# Patient Record
Sex: MALE | Race: WHITE | Employment: OTHER | URBAN - NONMETROPOLITAN AREA
[De-identification: names, ages, dates, MRNs, and addresses within clinical notes are randomized per-mention and may not be internally consistent; named-entity substitution may affect disease eponyms.]

---

## 2018-09-20 ENCOUNTER — HOSPITAL ENCOUNTER (OUTPATIENT)
Dept: SPEECH THERAPY | Age: 73
Setting detail: THERAPIES SERIES
Discharge: HOME | End: 2018-09-20
Payer: MEDICARE

## 2018-09-20 PROCEDURE — G9169 MEMORY GOAL STATUS: HCPCS

## 2018-09-20 PROCEDURE — G9168 MEMORY CURRENT STATUS: HCPCS

## 2018-09-20 PROCEDURE — 92523 SPEECH SOUND LANG COMPREHEN: CPT

## 2018-09-24 ENCOUNTER — HOSPITAL ENCOUNTER (OUTPATIENT)
Dept: OCCUPATIONAL THERAPY | Age: 73
Setting detail: THERAPIES SERIES
Discharge: HOME | End: 2018-09-24
Payer: MEDICARE

## 2018-09-24 ENCOUNTER — HOSPITAL ENCOUNTER (OUTPATIENT)
Dept: PHYSICAL THERAPY | Age: 73
Setting detail: THERAPIES SERIES
Discharge: HOME | End: 2018-09-24
Payer: MEDICARE

## 2018-09-24 PROCEDURE — 97161 PT EVAL LOW COMPLEX 20 MIN: CPT

## 2018-09-24 PROCEDURE — G8978 MOBILITY CURRENT STATUS: HCPCS

## 2018-09-24 PROCEDURE — G8979 MOBILITY GOAL STATUS: HCPCS

## 2018-09-24 PROCEDURE — G8987 SELF CARE CURRENT STATUS: HCPCS

## 2018-09-24 PROCEDURE — 97166 OT EVAL MOD COMPLEX 45 MIN: CPT

## 2018-09-24 PROCEDURE — G8988 SELF CARE GOAL STATUS: HCPCS

## 2018-09-24 NOTE — FLOWSHEET NOTE
** PLEASE SIGN, DATE AND TIME CERTIFICATION BELOW AND RETURN TO [facility] OUTPATIENT REHABILITATION (FAX #: [phone]). ATTEST/CO-SIGN IF ACCESSING VIA [employer]. THANK YOU.**    I certify that I have examined the patient below and determined that Physical Medicine and Rehabilitation service is necessary and that I approve the established plan of care for up to 90 days or as specifically noted. Attestation, signature or co-signature of physician indicates approval of certification requirements.    ________________________ ____________ __________  Physician Signature   Date   Time    [address]    Time In: 4047  Time Out: 2832  Minutes: 46  Timed Code Treatment Minutes: 0 Minutes     Date: 2018  Patient Name: Laurent Patel        CSN: [id number]     : 1945  (72 y.o.)  Gender: male   Referring Practitioner: Marcus Myers MD  Diagnosis: Lewy body demential with behavioral disturbance (G31.83,F02.81)  Treatment Diagnosis: Lewy body dementia with behavior disturbance  Additional Pertinent Hx: Patient's wife reports patient was diagnosed with Lewy body dementia in 2016. Patient's wife reports she has had to help him more this past year with ADLs. Patient wife reports on 18, patient fell and sustained fracture to left 4th digit. Wife reports splint cumbersome so they took it off. Laurent Patel  has no past medical history on file. Laurent Patel  has no past surgical history on file. See Medical History Questionnaire for information related to allergies and medications. General:  OT Visit Information  Onset Date: 18  OT Insurance Information: Medicare, secondary [employer], unlimited visists based on medical necessity, modalities covered, iontophoresis and hot/cold packs are not coverd.     Total # of Visits to Date: 1  Certification Period Expiration Date: 18  Progress Note Counter: 1/10 for PN  Comments: Script dated 9/12/18 for OT  Chart Reviewed: Yes  Patient assessed for rehabilitation services?: Yes    Restrictions/Precautions:  Restrictions/Precautions: General Precautions    Position Activity Restriction  Other position/activity restrictions: fall risk         Subjective:  Subjective: Patient reports his endurance is low. Pain:  Pain Assessment  Patient Currently in Pain: No    Social/Functional History:    Lives With: Spouse  Type of Home: House  Home Layout: Two level, Bed/Bath upstairs (bed and bath on first level for him)  Home Access: Stairs to enter with rails  Entrance Stairs - Number of Steps: 5  Entrance Stairs - Rails: Both  Home Equipment: Rolling walker (uses walker in morning when he is very stiff)     Bathroom Shower/Tub: Walk-in shower  Bathroom Toilet: Standard  Bathroom Equipment: Toilet raiser, Grab bars around toilet  IADL Comments: brushes teeth independently, wife asisit with shower and dressing,      Receives Help From: Family             Active : No  Occupation: Retired  Type of occupation: truck   retired. Leisure & Hobbies: Had [employer]  Additional Comments:  left handed, tremors greater in the left. Objective             Cognition Comment: Repeated cues for following directions. Able to state his birthday. Voice is low and soft. Questionable historian. Sensation  Overall Sensation Status: WFL (will monitor further)    Observation: leans to the left in sitting, forward head, left digits 4 and 5 with contusions, several scabs noted on bilateral arms - patient's wife reports these are from falls at home. Hand Dominance: Left            LUE AROM (degrees)  LUE General AROM: left UE AROM shoulder flexion = 82 degrees, active scaption ~ 80 degrees. Difficulty assessing ROM due to decreased following directions.  left active elbow extension limited          RUE AROM (degrees)  RUE General AROM: right UE AROM shoulder flexion = 80 degrees, active scaption ~ 80 degrees. Difficulty assessing ROM due to decreased following directions. right elbow extension actively limited. LUE Strength  LUE Strength Comment: General decreased strength and endurance. Not formally tested due to difficulty following directions. Left Hand Strength -  (lbs)  Handle Setting 2: 7              RUE Strength  RUE Strength Comment: General decreased strength and endurance. Not formally tested due to difficulty following directions. Right Hand Strength -  (lbs)  Handle Setting 2: 30                        Coordination and Movement description: Tremors (left UE tremors greater than right )                Left 9-Hole Peg Test:  (62 seconds)   Right 9 Hole Peg Test Time (secs):  (53 seconds)        Fine Motor Comment: slow with digit tip opposition              ADL  Feeding: Modified independent   Grooming: Moderate assistance (wife shaves patient, he brushes his teeth.  )  UE Bathing: Maximum assistance  LE Bathing: Maximum assistance  UE Dressing: Maximum assistance  LE Dressing: Maximum assistance (Hard to reach feet due to stiffness per patient wife report)  Toileting: Maximum assistance  Additional Comments: Assist with shaving, showering, dressing. Patient makes his oatmeal in the morning and brushes his teeth. Eats by himself with occasional assistance reequested. Transfers  Transfer Comments: Sit to stand with supervision. Occasional increased time due to decreased motor planning. Activity Tolerance: Additional Comments: Patient tolerated session well.       Assessment:  Performance deficits / Impairments: Decreased functional mobility , Decreased ADL status, Decreased strength, Decreased fine motor control, Decreased high-level IADLs, Decreased endurance, Decreased ROM, Decreased cognition, Decreased coordination  Prognosis: Fair  Clinical Decision Making: dressing tasks. OT G-codes  Functional Assessment Tool Used: Upper Extremity Functional Scale  Score: 11  Functional Limitation: Self care  Self Care Current Status (): At least 80 percent but less than 100 percent impaired, limited or restricted  Self Care Goal Status (): At least 60 percent but less than 80 percent impaired, limited or restricted       Evaluation Complexity: Based on the findings of patient history, examination, clinical presentation, and decision making during this evaluation, this patient is of medium complexity.     Lux Minor, OTR/L #[id number]

## 2018-09-24 NOTE — FLOWSHEET NOTE
Training    History: Personal factors or comorbidities that impact plan of care - Low Complexity: no personal factors or comorbidities    Examination: Body structures and functions, activity limitations, participation restrictions; using standardized tests and measures - Moderate Complexity: 3 or morebody structures and functional, activity limitations and/or participation restrictions. See restrictions and objective section above for details. Clinical Presentation: Moderate - Evolving with Changing Characteristics: moderate joint stiffness, and gait deficits presnet    Decision Making: Low Complexity due to expect good outcome with POC. Decision making was based on patient assessment and decision making process in determining plan of care and establishing reasonable expectations for measurable functional outcomes. Evaluation Complexity: Based on the findings of patient history, examination, clinical presentation, and decision making during this evaluation, the evaluation of Carolina Tomas  is of low complexity. Goals:  Patient goals : less falls, move better    Short term goals  Time Frame for Short term goals: 5 weeks   Short term goal 1: improve addison balcne score form a 20 to 26   Short term goal 2: increase strength B  LE by 1/2 muscle grade   Short term goal 3: increase ROm hip flex to 120, hip abd to 30 to assist with dressing   Short term goal 4: pat will perform sit to stand for 3 trial with contact guard assist for safetly adn vc     Long term goals  Time Frame for Long term goals : 10 weeks  Long term goal 1: inprove addison scale to 36 to lessen risk of falls  Long term goal 2: increase B LE strength to 4+ to allow for sit to stand with supervison  Long term goal 3: pat will walk with increased step length and minimal hesitation with turning corners.    Long term goal 4: I HEP     PT G-Codes  Functional Assessment Tool Used: Addison   Score: 20/56  Functional Limitation: Mobility: Walking and moving around  Mobility: Walking and Moving Around Current Status (): At least 60 percent but less than 80 percent impaired, limited or restricted  Mobility: Walking and Moving Around Goal Status ():  At least 20 percent but less than 40 percent impaired, limited or restricted    Deny Shaw, PT

## 2018-10-10 ENCOUNTER — HOSPITAL ENCOUNTER (OUTPATIENT)
Dept: PHYSICAL THERAPY | Age: 73
Setting detail: THERAPIES SERIES
Discharge: HOME | End: 2018-10-10
Payer: MEDICARE

## 2018-10-10 ENCOUNTER — HOSPITAL ENCOUNTER (OUTPATIENT)
Dept: OCCUPATIONAL THERAPY | Age: 73
Setting detail: THERAPIES SERIES
Discharge: HOME | End: 2018-10-10
Payer: MEDICARE

## 2018-10-10 PROCEDURE — 97110 THERAPEUTIC EXERCISES: CPT

## 2018-10-10 NOTE — PROGRESS NOTES
[facility]  OUTPATIENT OCCUPATIONAL THERAPY  Daily Note  Jesús Lebron    Time In: 2891  Time Out: 1328  Minutes: 50  Timed Code Treatment Minutes: 50 Minutes     Date: 10/10/2018  Patient Name: Ambrose Street        CSN: [id number]   : 1945  (72 y.o.)  Gender: male   Referring Practitioner: lAexandr Jones MD  Diagnosis: Lewy body demential with behavioral disturbance (G31.83,F02.81)          General:  OT Visit Information  Total # of Visits to Date: 2  Progress Note Counter: 2/10 for PN       Restrictions/Precautions:  Restrictions/Precautions: General Precautions    Position Activity Restriction  Other position/activity restrictions: fall risk         Subjective:  Subjective: Patient reports some difficulty with depth perception. Pain:  Patient Currently in Pain: No       Objective:     Upper Extremity Function  UE AROM: AROM Bilateral horizontal abduction/adduction x 10 reps, abduction/adduction x 10 reps with cues for Big motion, elbow flexion/extension x 10 reps, punches forward with reciprocal motion for bilateral UEs  UE AAROM: AAROM with dowel aaron for elbow flexion/extension, shoulder flexion/extension x 10 reps each, chest press with dowel aaron x 5 reps  UE Strengthing: gripping blue theraball with forward punches x 10 reps bilateral hiands, UBE at 120 2 minutes backward and 1 minute forward with minimal cues for hand placement. Coordination  Fine Motor: Pinching pennies out of yellow putty for fine motor coordination task                             Additional Activities Comment  Additional Activities: Left  = 18#, right  = 37#, Patient used toilet with wife checking to assist with fasteners as needed. Patient stood to wash hands with SBA. Moderate cues for motor planning to sit and turn and vise versa to get on and off UBE. Activity Tolerance: Additional Comments: Patient tolerated session well.      Assessment:  Assessment: Patient is

## 2018-10-11 ENCOUNTER — HOSPITAL ENCOUNTER (OUTPATIENT)
Dept: SPEECH THERAPY | Age: 73
Setting detail: THERAPIES SERIES
Discharge: HOME | End: 2018-10-11
Payer: MEDICARE

## 2018-10-11 PROCEDURE — 97127 HC SP THER IVNTJ W/FOCUS COG FUNCJ: CPT

## 2018-10-15 ENCOUNTER — HOSPITAL ENCOUNTER (OUTPATIENT)
Dept: OCCUPATIONAL THERAPY | Age: 73
Setting detail: THERAPIES SERIES
Discharge: HOME | End: 2018-10-15
Payer: MEDICARE

## 2018-10-15 ENCOUNTER — HOSPITAL ENCOUNTER (OUTPATIENT)
Dept: SPEECH THERAPY | Age: 73
Setting detail: THERAPIES SERIES
Discharge: HOME | End: 2018-10-15
Payer: MEDICARE

## 2018-10-15 ENCOUNTER — HOSPITAL ENCOUNTER (OUTPATIENT)
Dept: PHYSICAL THERAPY | Age: 73
Setting detail: THERAPIES SERIES
Discharge: HOME | End: 2018-10-15
Payer: MEDICARE

## 2018-10-15 PROCEDURE — 97530 THERAPEUTIC ACTIVITIES: CPT

## 2018-10-15 PROCEDURE — 97127 HC SP THER IVNTJ W/FOCUS COG FUNCJ: CPT

## 2018-10-15 PROCEDURE — 97110 THERAPEUTIC EXERCISES: CPT

## 2018-10-15 PROCEDURE — 97116 GAIT TRAINING THERAPY: CPT

## 2018-10-15 NOTE — PROGRESS NOTES
care.    Certification required: [] Yes - See Physician Certification below.       [x] No       Time in: 1100  Time out: 1130  Untimed treatment: 0  Timed treatment: 30  Total time: [address]

## 2018-10-18 ENCOUNTER — HOSPITAL ENCOUNTER (OUTPATIENT)
Dept: PHYSICAL THERAPY | Age: 73
Setting detail: THERAPIES SERIES
Discharge: HOME | End: 2018-10-18
Payer: MEDICARE

## 2018-10-18 ENCOUNTER — HOSPITAL ENCOUNTER (OUTPATIENT)
Dept: OCCUPATIONAL THERAPY | Age: 73
Setting detail: THERAPIES SERIES
Discharge: HOME | End: 2018-10-18
Payer: MEDICARE

## 2018-10-18 ENCOUNTER — HOSPITAL ENCOUNTER (OUTPATIENT)
Dept: SPEECH THERAPY | Age: 73
Setting detail: THERAPIES SERIES
Discharge: HOME | End: 2018-10-18
Payer: MEDICARE

## 2018-10-18 PROCEDURE — 97530 THERAPEUTIC ACTIVITIES: CPT

## 2018-10-18 PROCEDURE — 97110 THERAPEUTIC EXERCISES: CPT

## 2018-10-18 PROCEDURE — 97127 HC SP THER IVNTJ W/FOCUS COG FUNCJ: CPT

## 2018-10-22 ENCOUNTER — HOSPITAL ENCOUNTER (OUTPATIENT)
Dept: PHYSICAL THERAPY | Age: 73
Setting detail: THERAPIES SERIES
Discharge: HOME | End: 2018-10-22
Payer: MEDICARE

## 2018-10-22 ENCOUNTER — HOSPITAL ENCOUNTER (OUTPATIENT)
Dept: OCCUPATIONAL THERAPY | Age: 73
Setting detail: THERAPIES SERIES
Discharge: HOME | End: 2018-10-22
Payer: MEDICARE

## 2018-10-22 ENCOUNTER — HOSPITAL ENCOUNTER (OUTPATIENT)
Dept: SPEECH THERAPY | Age: 73
Setting detail: THERAPIES SERIES
Discharge: HOME | End: 2018-10-22
Payer: MEDICARE

## 2018-10-22 PROCEDURE — 97110 THERAPEUTIC EXERCISES: CPT

## 2018-10-22 PROCEDURE — 97530 THERAPEUTIC ACTIVITIES: CPT

## 2018-10-22 PROCEDURE — 97127 HC SP THER IVNTJ W/FOCUS COG FUNCJ: CPT

## 2018-10-22 PROCEDURE — 97116 GAIT TRAINING THERAPY: CPT

## 2018-10-25 ENCOUNTER — HOSPITAL ENCOUNTER (OUTPATIENT)
Dept: SPEECH THERAPY | Age: 73
Setting detail: THERAPIES SERIES
Discharge: HOME | End: 2018-10-25
Payer: MEDICARE

## 2018-10-25 ENCOUNTER — HOSPITAL ENCOUNTER (OUTPATIENT)
Dept: OCCUPATIONAL THERAPY | Age: 73
Setting detail: THERAPIES SERIES
Discharge: HOME | End: 2018-10-25
Payer: MEDICARE

## 2018-10-25 ENCOUNTER — HOSPITAL ENCOUNTER (OUTPATIENT)
Dept: PHYSICAL THERAPY | Age: 73
Setting detail: THERAPIES SERIES
Discharge: HOME | End: 2018-10-25
Payer: MEDICARE

## 2018-10-25 PROCEDURE — 97110 THERAPEUTIC EXERCISES: CPT

## 2018-10-25 PROCEDURE — 97530 THERAPEUTIC ACTIVITIES: CPT

## 2018-10-25 PROCEDURE — 97127 HC SP THER IVNTJ W/FOCUS COG FUNCJ: CPT

## 2018-10-25 NOTE — PROGRESS NOTES
exercise, gait, - BIG exercise program, large step size, standing balance   Current Treatment Recommendations: Strengthening, Balance Training, ROM, Functional Mobility Training, ADL/Self-care Training, Positioning, Modalities, Home Exercise Program, Safety Education & Training, Neuromuscular Re-education, Gait Training  Plan Comment: cont with POC    Goals:  Patient goals : less falls, move better    Short term goals  Time Frame for Short term goals: 5 weeks   Short term goal 1: improve addison balcne score form a 20 to 26   Short term goal 2: increase strength B  LE by 1/2 muscle grade   Short term goal 3: increase ROm hip flex to 120, hip abd to 30 to assist with dressing   Short term goal 4: pat will perform sit to stand for 3 trial with contact guard assist for safetly adn vc     Long term goals  Time Frame for Long term goals : 10 weeks  Long term goal 1: inprove addison scale to 36 to lessen risk of falls  Long term goal 2: increase B LE strength to 4+ to allow for sit to stand with supervison  Long term goal 3: pat will walk with increased step length and minimal hesitation with turning corners.    Long term goal 4: I HEP          Talia Ye  [id number]

## 2018-10-29 ENCOUNTER — HOSPITAL ENCOUNTER (OUTPATIENT)
Dept: OCCUPATIONAL THERAPY | Age: 73
Setting detail: THERAPIES SERIES
Discharge: HOME | End: 2018-10-29
Payer: MEDICARE

## 2018-10-29 ENCOUNTER — HOSPITAL ENCOUNTER (OUTPATIENT)
Dept: PHYSICAL THERAPY | Age: 73
Setting detail: THERAPIES SERIES
Discharge: HOME | End: 2018-10-29
Payer: MEDICARE

## 2018-10-29 ENCOUNTER — HOSPITAL ENCOUNTER (OUTPATIENT)
Dept: SPEECH THERAPY | Age: 73
Setting detail: THERAPIES SERIES
Discharge: HOME | End: 2018-10-29
Payer: MEDICARE

## 2018-10-29 PROCEDURE — 97110 THERAPEUTIC EXERCISES: CPT

## 2018-10-29 PROCEDURE — 97530 THERAPEUTIC ACTIVITIES: CPT

## 2018-10-29 PROCEDURE — 97127 HC SP THER IVNTJ W/FOCUS COG FUNCJ: CPT

## 2018-10-29 NOTE — PROGRESS NOTES
session well. Assessment:  Assessment: Patient is progressing towards his goals. Patient Education:  Patient Education: Verbal cues for tasks/activities.             Plan:  Plan Comment: Continue with current plan of care                      Babita Floyd OTR/L #[id number]

## 2018-10-31 ENCOUNTER — HOSPITAL ENCOUNTER (OUTPATIENT)
Dept: OCCUPATIONAL THERAPY | Age: 73
Setting detail: THERAPIES SERIES
Discharge: HOME | End: 2018-10-31
Payer: MEDICARE

## 2018-10-31 PROCEDURE — 97110 THERAPEUTIC EXERCISES: CPT

## 2018-11-01 ENCOUNTER — HOSPITAL ENCOUNTER (OUTPATIENT)
Dept: SPEECH THERAPY | Age: 73
Setting detail: THERAPIES SERIES
Discharge: HOME | End: 2018-11-01
Payer: MEDICARE

## 2018-11-01 ENCOUNTER — APPOINTMENT (OUTPATIENT)
Dept: OCCUPATIONAL THERAPY | Age: 73
End: 2018-11-01
Payer: MEDICARE

## 2018-11-01 ENCOUNTER — APPOINTMENT (OUTPATIENT)
Dept: PHYSICAL THERAPY | Age: 73
End: 2018-11-01
Payer: MEDICARE

## 2018-11-01 PROCEDURE — 97127 HC SP THER IVNTJ W/FOCUS COG FUNCJ: CPT

## 2018-11-01 PROCEDURE — G9168 MEMORY CURRENT STATUS: HCPCS

## 2018-11-01 PROCEDURE — G9169 MEMORY GOAL STATUS: HCPCS

## 2018-11-01 NOTE — FLOWSHEET NOTE
NEW GOAL: Patient will complete orientation, biographical information, and functional carryover tasks with 50% accuracy when provided min cues for use of external aids/compensatory strategies to promote improved awareness for participation in home and community activities. INTERVENTIONS:     Month: independent with self-referral to calendar   Date: mod cues with use of calendar required   Day of the week: mod cues required   Year: Independent with referral to calendar     Navigating through memory book for recall of functional information:  Recall of 2 things he did yesterday: required mod cues to refer to memory binder to recall 2/2 events     Prospective recall:  Pt independently recalled that he has an appointment in [facility] this afternoon. Pt completed short term memory task characterized by recall of 3 item grocery list when provided mod cues to utilize self-repetition. Pt completed with the following results:  Immediate recall: 2/3 items recalled independently; required max cues to recall 1/3 items  Repetition: 2/3 items recalled independently; required max cues to recall 1/3 items   4 minute time delay:  2/3 items recalled independently; required mod cues to recall 1/3 items    Goal 2: Patient will complete basic safety awareness, problem solving, and sequencing tasks with 50% accuracy given mod cues to improve independence for ADL completion. GOAL MET. NEW GOAL: Patient will complete basic safety awareness, problem solving, and sequencing tasks with 70% accuracy when provided min cues to improve independence for ADL completion. INTERVENTIONS:  Did not address due to focus on other goals.    PREVIOUS SESSION: Answering basic questions about a calendar:  3/7 independent; 3/7 with mod cues; 1/7 with max cues   Matching holidays with their corresponding months:  4/10 independent; 4/10 with min cues; 2/10 with mod cues     Goal 3: Patient will complete expressive (speech naming) and receptive impaired attention skills that affects his topic maintenance and ability to execute therapy tasks without distraction. Impaired working memory affects his ability to finish stories that he initiates, with patient often saying 'I forget what I was going to say.'  However, patient can be easily redirected. The patient has demonstrated overall improved conversational intelligibility to ~90% when provided min cues to maintain increased vocal intensity but demonstrates overt difficulty with executing 2 step verbal commands that contain temporal prepositions (ie 'before' and 'after').   Recommend ongoing speech therapy services for short duration, 2x/week for 2 weeks, to continue addressing carryover use of external memory aids, ability to sequence basic ADL tasks, and speech compensatory strategies to maximize success in home setting    Plan for Next Session: monitor carryover use of orientation aids; navigating memory aid to find functional information; speech intelligibility strategies in structured speech tasks; selective attention task; complex yes/no questions    Patient Tolerance of Treatment:  [x]Tolerated well []Tolerated fair []Required rest breaks  []Fatigued    Education:  [x]Education was provided []Education not provided due to:  Learner:  [x]Patient [x]Significant Other: after the session  []Other  Education provided regarding:  [x]Goals and POC []Diet and swallowing precautions    []Home Exercise Program:   [x]Progress and/or discharge information  Method of Education: [x]Discussion [x]Demonstration []Handout []Other:  Evaluation of Education:  [x]Verbalized understanding:  []Demonstrates without assistance  [x]Demonstrates with assistance: patient  [x]Needs further instruction    []No evidence of learning  []No family present      Plan: []Continue with current plan of care []Modify current plan of care   [x]Progress note - frequency and duration: 2x/week for 2 weeks    []Discharge notes - reason for

## 2018-11-02 ENCOUNTER — APPOINTMENT (OUTPATIENT)
Dept: PHYSICAL THERAPY | Age: 73
End: 2018-11-02
Payer: MEDICARE

## 2018-11-05 ENCOUNTER — HOSPITAL ENCOUNTER (OUTPATIENT)
Dept: PHYSICAL THERAPY | Age: 73
Setting detail: THERAPIES SERIES
Discharge: HOME | End: 2018-11-05
Payer: MEDICARE

## 2018-11-05 ENCOUNTER — HOSPITAL ENCOUNTER (OUTPATIENT)
Dept: OCCUPATIONAL THERAPY | Age: 73
Setting detail: THERAPIES SERIES
Discharge: HOME | End: 2018-11-05
Payer: MEDICARE

## 2018-11-05 ENCOUNTER — HOSPITAL ENCOUNTER (OUTPATIENT)
Dept: SPEECH THERAPY | Age: 73
Setting detail: THERAPIES SERIES
Discharge: HOME | End: 2018-11-05
Payer: MEDICARE

## 2018-11-05 PROCEDURE — 97127 HC SP THER IVNTJ W/FOCUS COG FUNCJ: CPT

## 2018-11-05 PROCEDURE — 97110 THERAPEUTIC EXERCISES: CPT

## 2018-11-05 PROCEDURE — 97530 THERAPEUTIC ACTIVITIES: CPT

## 2018-11-05 PROCEDURE — 97116 GAIT TRAINING THERAPY: CPT

## 2018-11-05 NOTE — PROGRESS NOTES
Reviewed exercise techniques            Plan:  Plan Comment: Continue with current plan of care.                        Dustin Eng, OTR/L #[id number]

## 2018-11-05 NOTE — PROGRESS NOTES
impairment level or higher to promote improved awareness and participation in home and community settings for optimal level of functionality. ONGOING  Goal 2: Patient will engage in 3 minute conversational sample with 100% intelligibility when provided no more than min cues to promote improved ability to express thoughts without difficulty. ONGOING      Assessment: [x]Progressing towards goals []Not Progressing towards goals    Plan for Next Session: monitor carryover use of orientation aids; navigating memory aid to find functional information; complex yes/no questions; basic safety problem solving     Patient Tolerance of Treatment:  [x]Tolerated well []Tolerated fair []Required rest breaks  []Fatigued    Education:  [x]Education was provided []Education not provided due to:  Learner:  [x]Patient [x]Significant Other: after the session  []Other  Education provided regarding:  [x]Goals and POC []Diet and swallowing precautions    [x]Home Exercise Program: selective attention visual scanning activities   []Progress and/or discharge information  Method of Education: [x]Discussion [x]Demonstration [x]Handout []Other:  Evaluation of Education:  [x]Verbalized understanding:  []Demonstrates without assistance  [x]Demonstrates with assistance: patient  [x]Needs further instruction    []No evidence of learning  []No family present      Plan: [x]Continue with current plan of care []Modify current plan of care   []Progress note - frequency and duration: 2x/week for 2 weeks    []Discharge notes - reason for discharge:     [x]Patient continues to require treatment by a licensed therapist to address functional deficits as outlined in the established plan of care. Certification required: [] Yes - See Physician Certification above.       [x] No       Time in: 1100  Time out: 1130  Untimed treatment: 0  Timed treatment: 30  Total time: [address] [facility] [address]

## 2018-11-08 ENCOUNTER — HOSPITAL ENCOUNTER (OUTPATIENT)
Dept: SPEECH THERAPY | Age: 73
Setting detail: THERAPIES SERIES
Discharge: HOME | End: 2018-11-08
Payer: MEDICARE

## 2018-11-08 ENCOUNTER — HOSPITAL ENCOUNTER (OUTPATIENT)
Dept: PHYSICAL THERAPY | Age: 73
Setting detail: THERAPIES SERIES
Discharge: HOME | End: 2018-11-08
Payer: MEDICARE

## 2018-11-08 ENCOUNTER — HOSPITAL ENCOUNTER (OUTPATIENT)
Dept: OCCUPATIONAL THERAPY | Age: 73
Setting detail: THERAPIES SERIES
Discharge: HOME | End: 2018-11-08
Payer: MEDICARE

## 2018-11-08 PROCEDURE — G8979 MOBILITY GOAL STATUS: HCPCS

## 2018-11-08 PROCEDURE — G8978 MOBILITY CURRENT STATUS: HCPCS

## 2018-11-08 PROCEDURE — G8987 SELF CARE CURRENT STATUS: HCPCS

## 2018-11-08 PROCEDURE — 97530 THERAPEUTIC ACTIVITIES: CPT

## 2018-11-08 PROCEDURE — G8988 SELF CARE GOAL STATUS: HCPCS

## 2018-11-08 PROCEDURE — 97116 GAIT TRAINING THERAPY: CPT

## 2018-11-08 PROCEDURE — 97127 HC SP THER IVNTJ W/FOCUS COG FUNCJ: CPT

## 2018-11-08 NOTE — PROGRESS NOTES
[address] THERAPY    [x] DAILY NOTE [] PROGRESS NOTE [] DISCHARGE NOTE    []Outpatient [address]   [][place] [address]   [x][facility]      Date: 2018  Patient Name: Min Heller      CSN: [id number]   : 1945  (72 y.o.)  Gender: male   Referring Physician:  Virginie Bustillos MD  Diagnosis: Lewy body dementia with behavioral disturbance  Secondary Diagnosis:  Cognitive deficits, expressive and receptive language deficits  Insurance/Certification Information: Medicare   Visit number / total approved visits: 10 - unlimited visits based on medical necessity   Visit count since last progress note: 2   Certification Date:   Last scheduled appointment: 11/15/18   Date of Last MBS:  N/A  Diet:  Regular diet with thin liquids    Pain: 0/10     Subjective: Pt pleasant and cooperative. Feedback provided to wife following session. SHORT TERM GOALS:  Short-term Goals  Timeframe for Short-term Goals: 3 weeks  Goal 1: Patient will complete orientation, biographical information, and functional carryover tasks with 50% accuracy when provided min cues for use of external aids/compensatory strategies to promote improved awareness for participation in home and community activities.    INTERVENTIONS:     Month: independent, no referral to calendar required   Date: min cues to find date on calendar   Day of the week: independent, no referral to calendar required     Year: Independent, no referral to calendar required   Upcoming holiday: min cues required     Navigating through memory book for recall of functional information:  Something he did previous date:  Spontaneously recalled x1 detail - max assist required to navigate through memory book to find 'daily schedule'   Recall of x2 doctors: x1 recalled spontaneously; x1 recalled independently with referral to memory book   Recall of x1 nationwide news event:

## 2018-11-08 NOTE — PROGRESS NOTES
actitities , but showing great improvment in strength and balance,   he will benfifit form continue skilled PT for progression of  therpauetic activities and gait   Prognosis: Good       Patient Education:  Patient Education: balance, exericise                      Plan:  Times per week: 2  Plan weeks: 5  Specific instructions for Next Treatment: exercise, gait, - BIG exercise program, large step size, standing balance     Goals:  Patient goals : less falls, move better    Short term goals  Time Frame for Short term goals: 5 weeks   Short term goal 1: improve aguilera balcne score from a 20 to 26   MET now 43,  see long term goals  Short term goal 2: increase strength B  LE by 1/2 muscle grade   met now LE strength 4/5  new goal  LE strength 4+   Short term goal 3: increase ROm hip flex to 120, hip abd to 30 to assist with dressing   not addressed today   Short term goal 4: pat will perform sit to stand for 3 trial with contact guard assist for safely with minimal vc -- paritally met  2 reps ,then fast decent  contineu goal .     Long term goals  Time Frame for Long term goals : 10 weeks  Long term goal 1: inprove aguilera scale to 36 to lessen risk of falls  MET  New goal-- AGUILERA balance scale 43 on 2 consective trials to be consistient with gait and lower risk of falls. Long term goal 2: increase B LE strength to 4+ to allow for sit to stand with supervison- npot met  contienu 5 more weeks. Long term goal 3: pat will walk with increased step length and minimal hesitation with turning corners. Long term goal 4: I HEP --ongoing,     PT G-Codes  Functional Assessment Tool Used: aguilera  Score: 43/56  Functional Limitation: Mobility: Walking and moving around  Mobility: Walking and Moving Around Current Status (): At least 20 percent but less than 40 percent impaired, limited or restricted  Mobility: Walking and Moving Around Goal Status ():  At least 20 percent but less than 40 percent impaired, limited or

## 2018-11-12 ENCOUNTER — HOSPITAL ENCOUNTER (OUTPATIENT)
Dept: SPEECH THERAPY | Age: 73
Setting detail: THERAPIES SERIES
Discharge: HOME | End: 2018-11-12
Payer: MEDICARE

## 2018-11-12 ENCOUNTER — HOSPITAL ENCOUNTER (OUTPATIENT)
Dept: OCCUPATIONAL THERAPY | Age: 73
Setting detail: THERAPIES SERIES
Discharge: HOME | End: 2018-11-12
Payer: MEDICARE

## 2018-11-12 PROCEDURE — 97110 THERAPEUTIC EXERCISES: CPT

## 2018-11-12 PROCEDURE — 97127 HC SP THER IVNTJ W/FOCUS COG FUNCJ: CPT

## 2018-11-15 ENCOUNTER — HOSPITAL ENCOUNTER (OUTPATIENT)
Dept: SPEECH THERAPY | Age: 73
Setting detail: THERAPIES SERIES
Discharge: HOME | End: 2018-11-15
Payer: MEDICARE

## 2018-11-15 ENCOUNTER — HOSPITAL ENCOUNTER (OUTPATIENT)
Dept: OCCUPATIONAL THERAPY | Age: 73
Setting detail: THERAPIES SERIES
Discharge: HOME | End: 2018-11-15
Payer: MEDICARE

## 2018-11-15 ENCOUNTER — HOSPITAL ENCOUNTER (OUTPATIENT)
Dept: PHYSICAL THERAPY | Age: 73
Setting detail: THERAPIES SERIES
Discharge: HOME | End: 2018-11-15
Payer: MEDICARE

## 2018-11-15 PROCEDURE — 97127 HC SP THER IVNTJ W/FOCUS COG FUNCJ: CPT

## 2018-11-15 PROCEDURE — G9169 MEMORY GOAL STATUS: HCPCS

## 2018-11-15 PROCEDURE — G9168 MEMORY CURRENT STATUS: HCPCS

## 2018-11-15 PROCEDURE — 97110 THERAPEUTIC EXERCISES: CPT

## 2018-11-15 PROCEDURE — 97530 THERAPEUTIC ACTIVITIES: CPT

## 2018-11-15 NOTE — FLOWSHEET NOTE
Next Session: monitor carryover use of orientation aids; navigating memory aid to find functional information; 2 step verbal commands; basic problem solving     Patient Tolerance of Treatment:  [x]Tolerated well []Tolerated fair []Required rest breaks  []Fatigued    Education:  [x]Education was provided []Education not provided due to:  Learner:  [x]Patient [x]Significant Other: before and after the session  []Other  Education provided regarding:  [x]Goals and POC []Diet and swallowing precautions    [x]Home Exercise Program: 3 step written directions; functional recall task  [x]Progress and/or discharge information  Method of Education: [x]Discussion [x]Demonstration [x]Handout []Other:  Evaluation of Education:  [x]Verbalized understanding:  []Demonstrates without assistance  [x]Demonstrates with assistance: patient  [x]Needs further instruction    []No evidence of learning  []No family present      Plan: []Continue with current plan of care []Modify current plan of care   [x]Progress note - frequency and duration: 1x/week for 4 weeks    []Discharge notes - reason for discharge:     [x]Patient continues to require treatment by a licensed therapist to address functional deficits as outlined in the established plan of care. Certification required: [x] Yes - See Physician Certification above. [] No       Time in: 1235  Time out: 1300  Untimed treatment: 0  Timed treatment: 25  Total time: 25    SLP G-Codes  Functional Assessment Tool Used: Functional Communication Measures   Score: 3  Functional Limitations: Memory  Memory Current Status (): At least 60 percent but less than 80 percent impaired, limited or restricted  Memory Goal Status ():  At least 40 percent but less than 60 percent impaired, limited or restricted        Lizzie Benavidez [address], [address]

## 2018-11-20 ENCOUNTER — HOSPITAL ENCOUNTER (OUTPATIENT)
Dept: OCCUPATIONAL THERAPY | Age: 73
Setting detail: THERAPIES SERIES
Discharge: HOME | End: 2018-11-20
Payer: MEDICARE

## 2018-11-20 PROCEDURE — 97110 THERAPEUTIC EXERCISES: CPT

## 2018-11-20 ASSESSMENT — PAIN DESCRIPTION - LOCATION: LOCATION: ABDOMEN

## 2018-11-20 ASSESSMENT — PAIN SCALES - GENERAL: PAINLEVEL_OUTOF10: 1

## 2018-11-20 ASSESSMENT — PAIN DESCRIPTION - ORIENTATION: ORIENTATION: RIGHT;LOWER

## 2018-11-20 NOTE — PROGRESS NOTES
[facility]  OUTPATIENT OCCUPATIONAL THERAPY  Daily Note  True Bejarano    Time In: 1103  Time Out: 1600  Minutes: 30  Timed Code Treatment Minutes: 30 Minutes     Date: 2018  Patient Name: Tonny Lennox        CSN: [id number]   : 1945  (72 y.o.)  Gender: male   Referring Practitioner: Howard Garcia MD  Diagnosis: Lewy body demential with behavioral disturbance (G31.83,F02.81)          General:  OT Visit Information  Onset Date: 18  OT Insurance Information: Medicare, secondary Hum[employer], unlimited visists based on medical necessity, modalities covered, iontophoresis and hot/cold packs are not coverd. Total # of Visits to Date: 15  Certification Period Expiration Date: 18  Progress Note Counter: 3/10 for PN  Comments: Script dated 18 for OT       Restrictions/Precautions:  Restrictions/Precautions: General Precautions    Position Activity Restriction  Other position/activity restrictions: fall risk         Subjective:  Subjective: Patient relates he has pain in his lower right abdomin for 2-3 days now, did take some pepto bismol which seemed to help. Pain:  Patient Currently in Pain: Yes  Pain Assessment: 0-10  Pain Level: 1  Pain Location: Abdomen  Pain Orientation: Right, Lower       Objective:     Upper Extremity Function  UE Strengthing: Seated yellow theraband bilateral horizontal abduction/adduction x10 reps, rows with therapist holding 1 end of band x10 reps.  speed x1 minute forward x1 minute backward. Additional Activities Comment  Additional Activities: Standing at counter top to complete activity with graded clothespins using bilateral UE's to place onto vertical aaron, significant cues needed at start of task to place onto aaron as patient tends to pinch and move up one horizontal aaron. Several reminders needed and increased time.  Then had patient remove clothespins and place like colors together on horizontal

## 2018-11-21 ENCOUNTER — APPOINTMENT (OUTPATIENT)
Dept: PHYSICAL THERAPY | Age: 73
End: 2018-11-21
Payer: MEDICARE

## 2018-11-27 ENCOUNTER — HOSPITAL ENCOUNTER (OUTPATIENT)
Dept: PHYSICAL THERAPY | Age: 73
Setting detail: THERAPIES SERIES
Discharge: HOME | End: 2018-11-27
Payer: MEDICARE

## 2018-11-27 PROCEDURE — 97110 THERAPEUTIC EXERCISES: CPT

## 2018-11-29 ENCOUNTER — HOSPITAL ENCOUNTER (OUTPATIENT)
Dept: PHYSICAL THERAPY | Age: 73
Setting detail: THERAPIES SERIES
Discharge: HOME | End: 2018-11-29
Payer: MEDICARE

## 2018-11-29 ENCOUNTER — HOSPITAL ENCOUNTER (OUTPATIENT)
Dept: OCCUPATIONAL THERAPY | Age: 73
Setting detail: THERAPIES SERIES
Discharge: HOME | End: 2018-11-29
Payer: MEDICARE

## 2018-11-29 ENCOUNTER — HOSPITAL ENCOUNTER (OUTPATIENT)
Dept: SPEECH THERAPY | Age: 73
Setting detail: THERAPIES SERIES
Discharge: HOME | End: 2018-11-29
Payer: MEDICARE

## 2018-11-29 PROCEDURE — 97127 HC SP THER IVNTJ W/FOCUS COG FUNCJ: CPT

## 2018-11-29 PROCEDURE — 97110 THERAPEUTIC EXERCISES: CPT

## 2018-11-29 NOTE — PROGRESS NOTES
[x]Education was provided []Education not provided due to:  Learner:  [x]Patient []Significant Other: before and after the session  []Other  Education provided regarding:  [x]Goals and POC []Diet and swallowing precautions    [x]Home Exercise Program: time management problems   []Progress and/or discharge information  Method of Education: [x]Discussion [x]Demonstration [x]Handout []Other:  Evaluation of Education:  [x]Verbalized understanding:  []Demonstrates without assistance  [x]Demonstrates with assistance: patient  [x]Needs further instruction    []No evidence of learning  [x]No family present      Plan: [x]Continue with current plan of care []Modify current plan of care   []Progress note - frequency and duration: 1x/week for 4 weeks    []Discharge notes - reason for discharge:     [x]Patient continues to require treatment by a licensed therapist to address functional deficits as outlined in the established plan of care. Certification required: [] Yes - See Physician Certification above.       [x] No       Time in: 1133  Time out: 1200  Untimed treatment: 0  Timed treatment: 27  Total time: Logan Benavidez [address], [address]

## 2018-12-03 ENCOUNTER — HOSPITAL ENCOUNTER (OUTPATIENT)
Dept: OCCUPATIONAL THERAPY | Age: 73
Setting detail: THERAPIES SERIES
Discharge: HOME | End: 2018-12-03
Payer: MEDICARE

## 2018-12-03 ENCOUNTER — HOSPITAL ENCOUNTER (OUTPATIENT)
Dept: PHYSICAL THERAPY | Age: 73
Setting detail: THERAPIES SERIES
Discharge: HOME | End: 2018-12-03
Payer: MEDICARE

## 2018-12-03 PROCEDURE — 97110 THERAPEUTIC EXERCISES: CPT

## 2018-12-03 NOTE — PROGRESS NOTES
[facility]  OUTPATIENT OCCUPATIONAL THERAPY  Daily Note  Jany Lou    Time In: 1430  Time Out: 1500  Minutes: 30  Timed Code Treatment Minutes: 30 Minutes     Date: 12/3/2018  Patient Name: Trice Chu        CSN: [id number]   : 1945  (72 y.o.)  Gender: male   Referring Practitioner: Valarie Mayo MD  Diagnosis: Lewy body demential with behavioral disturbance (G31.83,F02.81)          General:  OT Visit Information  Total # of Visits to Date: 15  Progress Note Counter: 5/10 for PN       Restrictions/Precautions:  Restrictions/Precautions: General Precautions    Position Activity Restriction  Other position/activity restrictions: fall risk         Subjective:  Subjective: Patient is pleasant and cooperative, nothing new to report today. Pain:  Patient Currently in Pain: No       Objective:     Upper Extremity Function  UE PROM: Supine PROM to bilateral shoulders all motions to tolerance -  some tightness noted in all planes. UE AAROM: Pulleys for shoulder flexion and scaption, standing wall slides for flexion bilateral UEs x 10 reps each, supine AAROM with dowel aaron for shoulder flexion, horizontal abduction/adduction and chest press x 10 reps each  UE Stretching: Supine over pressure from therapist for pec stretchin 10 second hold x 5 reps. UE Strengthing: Red theraputty for bilateral , taffy pulls and pinches                                                Activity Tolerance: Additional Comments: Patient tolerated treatment well. Assessment:  Assessment: Patient is progressing towards his goals. Patient Education:  Patient Education: No changes to HEP. Plan:  Plan Comment: Continue with current plan of care.                        Geo Herring OTR/L #[id number]

## 2018-12-06 ENCOUNTER — HOSPITAL ENCOUNTER (OUTPATIENT)
Dept: PHYSICAL THERAPY | Age: 73
Setting detail: THERAPIES SERIES
Discharge: HOME | End: 2018-12-06
Payer: MEDICARE

## 2018-12-06 ENCOUNTER — HOSPITAL ENCOUNTER (OUTPATIENT)
Dept: SPEECH THERAPY | Age: 73
Setting detail: THERAPIES SERIES
Discharge: HOME | End: 2018-12-06
Payer: MEDICARE

## 2018-12-06 ENCOUNTER — HOSPITAL ENCOUNTER (OUTPATIENT)
Dept: OCCUPATIONAL THERAPY | Age: 73
Setting detail: THERAPIES SERIES
Discharge: HOME | End: 2018-12-06
Payer: MEDICARE

## 2018-12-06 PROCEDURE — 97127 HC SP THER IVNTJ W/FOCUS COG FUNCJ: CPT

## 2018-12-06 PROCEDURE — 97110 THERAPEUTIC EXERCISES: CPT

## 2018-12-06 NOTE — PROGRESS NOTES
[address] THERAPY      [x] DAILY NOTE [] PROGRESS NOTE [] DISCHARGE NOTE    []Outpatient [address]   [][place] [facility]   [x][facility]      Date: 2018  Patient Name: Jean Lewis      CSN: [id number]   : 1945  (72 y.o.)  Gender: male   Referring Physician:  Amber Quiñones MD  Diagnosis: Lewy body dementia with behavioral disturbance  Secondary Diagnosis:  Cognitive deficits, expressive and receptive language deficits  Insurance/Certification Information: Medicare   Visit number / total approved visits: 15 - unlimited visits based on medical necessity   Visit count since last progress note: 2   Certification Date: 56/10/17  Last scheduled appointment: 18  Date of Last MBS:  N/A   Diet:  Regular diet with thin liquids    Pain: 0/10     Subjective: Pt cooperative and attentive, verbalized some frustration with time related cognitive tasks. Feedback provided to wife following session. SHORT TERM GOALS:  Short-term Goals  Timeframe for Short-term Goals: 4 weeks  Goal 1: Patient will complete orientation, biographical and emergency information, and functional carryover tasks with 80% accuracy at a Mod I level with independent use of external aids and compensatory strategies to promote improved awareness for participation in home and community activities. INTERVENTIONS:     Month: independent   Date: independent with self-referral to calendar     Day of the week: independent    Year: Independent   Upcoming holiday: independent     Navigating through memory book for recall of functional information:  Finding medication list:  Mod I     Goal 2: Patient will complete basic safety awareness, problem solving, and sequencing tasks with 70% accuracy when provided min cues to improve independence for ADL completion.     INTERVENTIONS: Putting stimulus times into an analog clock:   with mod cues - patient

## 2018-12-10 ENCOUNTER — HOSPITAL ENCOUNTER (OUTPATIENT)
Dept: PHYSICAL THERAPY | Age: 73
Setting detail: THERAPIES SERIES
Discharge: HOME | End: 2018-12-10
Payer: MEDICARE

## 2018-12-10 ENCOUNTER — HOSPITAL ENCOUNTER (OUTPATIENT)
Dept: OCCUPATIONAL THERAPY | Age: 73
Setting detail: THERAPIES SERIES
Discharge: HOME | End: 2018-12-10
Payer: MEDICARE

## 2018-12-10 PROCEDURE — 97110 THERAPEUTIC EXERCISES: CPT

## 2018-12-13 ENCOUNTER — HOSPITAL ENCOUNTER (OUTPATIENT)
Dept: OCCUPATIONAL THERAPY | Age: 73
Setting detail: THERAPIES SERIES
Discharge: HOME | End: 2018-12-13
Payer: MEDICARE

## 2018-12-13 ENCOUNTER — HOSPITAL ENCOUNTER (OUTPATIENT)
Dept: PHYSICAL THERAPY | Age: 73
Setting detail: THERAPIES SERIES
Discharge: HOME | End: 2018-12-13
Payer: MEDICARE

## 2018-12-13 ENCOUNTER — HOSPITAL ENCOUNTER (OUTPATIENT)
Dept: SPEECH THERAPY | Age: 73
Setting detail: THERAPIES SERIES
Discharge: HOME | End: 2018-12-13
Payer: MEDICARE

## 2018-12-13 PROCEDURE — 97110 THERAPEUTIC EXERCISES: CPT

## 2018-12-13 PROCEDURE — G9170 MEMORY D/C STATUS: HCPCS

## 2018-12-13 PROCEDURE — 97127 HC SP THER IVNTJ W/FOCUS COG FUNCJ: CPT

## 2018-12-13 PROCEDURE — G9169 MEMORY GOAL STATUS: HCPCS

## 2018-12-13 NOTE — PROGRESS NOTES
high-level IADLs  Prognosis: Good  Discharge Recommendations: Continue to assess pending progress    Patient Education:  Patient Education: ther ex                       Plan:  Times per week: 2  Plan weeks: 5  Specific instructions for Next Treatment: exercise, gait, - BIG exercise program, large step size, standing balance   Current Treatment Recommendations: Strengthening, Balance Training, ROM, Functional Mobility Training, ADL/Self-care Training, Positioning, Modalities, Home Exercise Program, Safety Education & Training, Neuromuscular Re-education, Gait Training  Plan Comment: cont with POC    Goals:  Patient goals : less falls, move better    Short term goals  Time Frame for Short term goals: 5 weeks   Short term goal 1: improve aguilera balcne score from a 20 to 26   MET now 43,  see long term goals  Short term goal 2: increase strength B  LE by 1/2 muscle grade   met now LE strength 4/5  new goal  LE strength 4+   Short term goal 3: increase ROm hip flex to 120, hip abd to 30 to assist with dressing   not addressed today   Short term goal 4: pat will perform sit to stand for 3 trial with contact guard assist for safely with minimal vc -- paritally met  2 reps ,then fast decent  contineu goal .     Long term goals  Time Frame for Long term goals : 10 weeks  Long term goal 1: inprove aguilera scale to 36 to lessen risk of falls  MET  New goal-- AGUILERA balance scale 43 on 2 consective trials to be consistient with gait and lower risk of falls. Long term goal 2: increase B LE strength to 4+ to allow for sit to stand with supervison- npot met  contienu 5 more weeks. Long term goal 3: pat will walk with increased step length and minimal hesitation with turning corners.    Long term goal 4: I HEP --ongoing,          Khloe Lewis  [id number]

## 2018-12-13 NOTE — PROGRESS NOTES
[facility]  OUTPATIENT OCCUPATIONAL THERAPY  Daily Note  Brendon Hernandez    Time In: 1300  Time Out: 1330  Minutes: 30  Timed Code Treatment Minutes: 30 Minutes     Date: 2018  Patient Name: Lulú Agustin        CSN: [id number]   : 1945  (72 y.o.)  Gender: male   Referring Practitioner: Alberto Larsen MD  Diagnosis: Lewy body demential with behavioral disturbance (G31.83,F02.81)          General:  OT Visit Information  Total # of Visits to Date: 25  Progress Note Counter: 8/10 for PN       Restrictions/Precautions:  Restrictions/Precautions: General Precautions    Position Activity Restriction  Other position/activity restrictions: fall risk         Subjective:  Subjective: Patient is pleasant and cooperative. Pain:  Patient Currently in Pain: No       Objective:     Upper Extremity Function  UE AROM: Sitting AROM bilateral arms shoulder flexion while holding onto playground ball x 10 reps, then horizontal abduciton/adduction holding onto ball  x 10 reps  UE AAROM: Pulleys for shoulder flexion and scaption  UE Strengthing: Biodex at 90 resistance 2 minutes forward and 3 minutes backward                              Additional Activities Comment  Additional Activities: Standing at counter top to complete MRMT pegboard, placing pegs and then turning them over, avoiding using board for support. Patient used bilateral hands for task. Patient stood x 16 minutes with steady but slow pace for task. Activity Tolerance: Additional Comments: Patient tolerated treatment well. Assessment:  Assessment: Patient is progressing towards his goals. Patient Education:  Patient Education: No changes to HEP            Plan:  Plan Comment: Continue with current plan of care.                        Zion Harrington, OTR/L #[id number]

## 2018-12-13 NOTE — DISCHARGE SUMMARY
increase vocal intensity when in a vehicle (more background noise). Patient also reports his brother has commented on patient's improved intelligibility on the telephone. Provided discharge education re: home exercise program to promote cognitive stimulation in the home setting, including puzzles, reading, continued use of calendar/memory binder, planning items needed for simple meals, recalling 2-3 items on grocery list, etc.  Continue to recommend that wife manage patient's medications and for patient to have 24 hour supervision in home setting. Patient Tolerance of Treatment:  [x]Tolerated well []Tolerated fair []Required rest breaks  []Fatigued    Education:  [x]Education was provided []Education not provided due to:  Learner:  [x]Patient [x]Significant Other []Other  Education provided regarding:  [x]Goals and POC []Diet and swallowing precautions    [x]Home Exercise Program:   [x]Progress and/or discharge information  Method of Education: [x]Discussion [x]Demonstration []Handout []Other:  Evaluation of Education:  [x]Verbalized understanding:  []Demonstrates without assistance  [x]Demonstrates with assistance: patient  []Needs further instruction    []No evidence of learning  []No family present      Plan: []Continue with current plan of care []Modify current plan of care   []Progress note - frequency and duration:     [x]Discharge notes - reason for discharge: long term goals met     []Patient continues to require treatment by a licensed therapist to address functional deficits as outlined in the established plan of care. Certification required: [] Yes - See Physician Certification above. [x] No       Time in: 1230  Time out: 1300  Untimed treatment: 5  Timed treatment: 25  Total time: 30      SLP G-Codes  Functional Assessment Tool Used: Functional Communication Measures   Score: 4  Functional Limitations: Memory  Memory Goal Status ():  At least 40 percent but less than 60 percent

## 2018-12-17 ENCOUNTER — HOSPITAL ENCOUNTER (OUTPATIENT)
Dept: PHYSICAL THERAPY | Age: 73
Setting detail: THERAPIES SERIES
Discharge: HOME | End: 2018-12-17
Payer: MEDICARE

## 2018-12-17 ENCOUNTER — HOSPITAL ENCOUNTER (OUTPATIENT)
Dept: OCCUPATIONAL THERAPY | Age: 73
Setting detail: THERAPIES SERIES
Discharge: HOME | End: 2018-12-17
Payer: MEDICARE

## 2018-12-17 PROCEDURE — 97110 THERAPEUTIC EXERCISES: CPT

## 2018-12-17 NOTE — PROGRESS NOTES
AGUILERA balance scale 43 on 2 consective trials to be consistient with gait and lower risk of falls. Long term goal 2: increase B LE strength to 4+ to allow for sit to stand with supervison- npot met  contienu 5 more weeks. Long term goal 3: pat will walk with increased step length and minimal hesitation with turning corners.    Long term goal 4: I HEP --ongoing,          Raiza Nix  [id number]

## 2018-12-20 ENCOUNTER — HOSPITAL ENCOUNTER (OUTPATIENT)
Dept: PHYSICAL THERAPY | Age: 73
Setting detail: THERAPIES SERIES
Discharge: HOME | End: 2018-12-20
Payer: MEDICARE

## 2018-12-20 ENCOUNTER — APPOINTMENT (OUTPATIENT)
Dept: SPEECH THERAPY | Age: 73
End: 2018-12-20
Payer: MEDICARE

## 2018-12-20 ENCOUNTER — HOSPITAL ENCOUNTER (OUTPATIENT)
Dept: OCCUPATIONAL THERAPY | Age: 73
Setting detail: THERAPIES SERIES
Discharge: HOME | End: 2018-12-20
Payer: MEDICARE

## 2018-12-20 PROCEDURE — G8988 SELF CARE GOAL STATUS: HCPCS

## 2018-12-20 PROCEDURE — G8980 MOBILITY D/C STATUS: HCPCS

## 2018-12-20 PROCEDURE — 97110 THERAPEUTIC EXERCISES: CPT

## 2018-12-20 PROCEDURE — G8979 MOBILITY GOAL STATUS: HCPCS

## 2018-12-20 PROCEDURE — G8987 SELF CARE CURRENT STATUS: HCPCS

## 2018-12-20 NOTE — DISCHARGE SUMMARY
up B UE support, Marching (exaggerated) x 2laps       Aguilera Balance Score: 43         Aguilera Balance Disability Index 20-39% (calculated)  by Alice Larry, PT  at 11/08/18 1149  20-39% (calculated)     Chaz Ashford CMS Modifier CJ (calculated)  by Alice Larry, PT  at 11/08/18 1149  CJ (calculated)       Activity Tolerance:  Activity Tolerance: Patient Tolerated treatment well    Assessment: Body structures, Functions, Activity limitations: Decreased high-level IADLs  Assessment: cognitiion is limiting ablity to cont with HEP, he will benfift from a continued activity at home and with assist of wife. He has been given info on a group Parkinson program   Prognosis: Good       Patient Education:  Patient Education: HEP, posture                      Plan:  Times per week: discharge, cont HEP     Goals:  Patient goals : less falls, move better    Short term goals  Time Frame for Short term goals: 5 weeks   Short term goal 1: improve aguilera balacne score from a 20 to 26   MET now 43,   Short term goal 2: LE strength 4+   MET  Short term goal 3: increase ROm hip flex to 120, hip abd to 30 to assist with dressing   not met  hip flex on right 100, left 105,   Short term goal 4: pat will perform sit to stand for 3 trial with contact guard assist for safely with minimal vc -- paritally met  2 reps    Long term goals  Time Frame for Long term goals : 10 weeks  Long term goal 1:  AGUILERA balance scale 43 on 2 consective trials to be consistient with gait and lower risk of falls. MET   Long term goal 2: increase B LE strength to 4+ to allow for sit to stand with supervison-  MET   Long term goal 3: pat will walk with increased step length and minimal hesitation with turning corners. Partially met  step length still shortened and decreased belén.    Long term goal 4: I HEP   MET with wife assist  he is mainly walking    PT G-Codes  Functional Assessment Tool Used: Aguilera  Score: 43/56  Functional Limitation: Mobility: Walking and moving

## 2018-12-20 NOTE — DISCHARGE SUMMARY
however, wife reports they are members of the [facility] and plan to begin walking. Encouraged wife to allow patient to participate in homemaking tasks such as sitting to fold laundry or standing to wash dishes. At this time patient has become more independent and is able to manage at home without any recent falls. Plan to discharge at this time with encouragement for patient to return if a change status arises. Patient Education:  Patient Education: Reviewed progress towards goals and plan of care for discharge. Plan:  Plan Comment: Continue with current plan of care. Patient goals : Be able to go up and down steps easier. Be able to be more indpendent with dressing. Increase endurance. GOAL MET Patient reports he has just recently been comfortable going up and down the steps with his wife nearby. DISCHARGE 12/20/2018    Short term goals  Short term goal 1: Patient will increase fine motor coordination as demonstrated by completing 9 hole peg test on the right in 38 seconds and 50 seconds onf the left for increased ease with buttoning buttons. GOAL NOT MET right hand = 42 seconds, GOAL MET left hand = 46 seconds and is able button with increased time. DISCHARGE 12/20/2018  Short term goal 2: Patient will complete bilateral UE gross motor exercises 5 reps x 2 sets 3 exercises with no more than 2 cues for following directions for increased strength for dressing ease. GOAL NOT MET Patient relates he has not been completing the exercises, relates he is going to begin walking more. Needs significant cues throughout sessions to follow directions for UB exercises. DISCHARGE 12/20/2018  Short term goal 3: Patient will increase stand endurance to 13 minutes with SBA for increased endurance to complete unloading  task. GOAL MET Patient demonstrates ability to stand 12 minutes in the clinic and relates he is now able to load and unload the  without assistance.  DISCHARGE 12/20/2018  Short

## 2019-07-01 ENCOUNTER — HOSPITAL ENCOUNTER (OUTPATIENT)
Dept: OCCUPATIONAL THERAPY | Age: 74
Setting detail: THERAPIES SERIES
Discharge: HOME | End: 2019-07-01
Payer: MEDICARE

## 2019-07-01 ENCOUNTER — HOSPITAL ENCOUNTER (OUTPATIENT)
Dept: PHYSICAL THERAPY | Age: 74
Setting detail: THERAPIES SERIES
Discharge: HOME | End: 2019-07-01
Payer: MEDICARE

## 2019-07-01 PROCEDURE — 97161 PT EVAL LOW COMPLEX 20 MIN: CPT

## 2019-07-01 PROCEDURE — 97165 OT EVAL LOW COMPLEX 30 MIN: CPT

## 2019-07-01 PROCEDURE — 97110 THERAPEUTIC EXERCISES: CPT

## 2019-07-01 NOTE — FLOWSHEET NOTE
gait, balance, endurance, speech language/cognition  Chart Reviewed: Yes  Patient assessed for rehabilitation services?: Yes  Family / Caregiver Present: Yes    Restrictions/Precautions:  Restrictions/Precautions: General Precautions, Fall Risk              Subjective:  Subjective: Patient states it takes him a short time to fall asleep. Patient indicates he would like to increase his endurance.           Pain:  Pain Assessment  Patient Currently in Pain: No    Social/Functional History:    Type of Home: House  Home Layout: Able to Live on Main level with bedroom/bathroom  Home Access: Stairs to enter with rails(6 steps to go into house)  Entrance Stairs - Number of Steps: 6     Bathroom Shower/Tub: Walk-in shower  Bathroom Toilet: Handicap height  Bathroom Equipment: Grab bars around toilet       ADL Assistance: Needs assistance          Active : No  Occupation: Retired  Leisure & Hobbies: [employer]    Objective  Vision: Within Functional Limits  Hearing: Within functional limits                    Sensation  Overall Sensation Status: WFL    Observation: resting tremor left hand    Hand Dominance: Left(ambidextourous per patient and his wife)            LUE AROM (degrees)  LUE AROM : Exceptions  L Shoulder Flexion 0-180: 100  L Shoulder ABduction 0-180: 80  L Shoulder Int Rotation  0-70: able to touch ~5 inches above waistband  L Shoulder Ext Rotation  0-90: able to touch back of his head  Left Hand AROM (degrees)  Left Hand AROM: Exceptions       RUE AROM (degrees)  RUE AROM : Exceptions  R Shoulder Flexion 0-180: 100  R Shoulder ABduction 0-180: 75  R Shoulder Int Rotation  0-70: able to touch 5\" above wasistband  R Shoulder Ext Rotation 0-90: ablet to touch to his forhead in front of his ear  R Elbow Flexion 0-145:  degrees       LUE Strength  LUE Strength Comment: left shoulder flexion/extension, abduction/adduction 4-/5.  left elbow flexion/extension 4-/5    Left Hand Strength -  endurance, adaptive equipment education    Goals:  Patient goals : Increase endurance and coordination    Short term goals  Time Frame for Short term goals: 5 weeks  Short term goal 1: Patient will increase right UE AROM shoulder fleixon to 110, abduction to 85 degrees and ER to touch to his right ear and increase elbow extension to (-) 15 degrees for increased ease with dressing. Short term goal 2: Patient will increase left UE AROM shoulder flexion to 115, abduction to 90 degrees for increased ease wiht dressing and bathing. Short term goal 3: Patient will increase fine motor coordination as demonstrated by 9 hole peg test right to 46 seconds and left to 55 seconds for increased ease with working fasteners on clothing. Short term goal 4: Patient will explore adaptive equipment and techniques for increased ease with eating and grooming. Long term goals  Time Frame for Long term goals : 8 weeks  Long term goal 1: Patient will increase stand endurance to 10 minutes during UE activity for increased endurance for ease with ADLs at home. Long term goal 2: Patient and patients wife will report patient ability to shane shirt with no greater than minimal assist.   Long term goal 3: Patient will complete UE HEP with supervision from wife for increased ease with dressing and bathing tasks. Evaluation Complexity: Based on the findings of patient history, examination, clinical presentation, and decision making during this evaluation, this patient is of low complexity.     Akil Levy, OTR/L #[id number]

## 2019-07-01 NOTE — FLOWSHEET NOTE
** PLEASE SIGN, DATE AND TIME CERTIFICATION BELOW AND RETURN TO [facility] OUTPATIENT REHABILITATION (FAX #: [phone]). ATTEST/CO-SIGN IF ACCESSING VIA [employer]. THANK YOU.**    I certify that I have examined the patient below and determined that Physical Medicine and Rehabilitation service is necessary and that I approve the established plan of care for up to 90 days or as specifically noted. Attestation, signature or co-signature of physician indicates approval of certification requirements.    ________________________ ____________ __________  Physician Signature   Date   Time       [address]     Time In: 5264  Time Out: 1500  Minutes: 46  Timed Code Treatment Minutes: 9 Minutes                Date: 2019  Patient Name: Sarah Tapia,  Gender:  male        CSN: [id number]   : 1945  (69 y.o.)  Referral Date : 19     Referring Practitioner:  Claire Mar CNP      Diagnosis: Lewy body dementia without  behavioral disturbance  Treatment Diagnosis: gait dysfunction, decreased balance and strength,, right leg weakness. Additional Pertinent Hx: History HBP, incontinence, memory issues,        General:  PT Visit Information  Onset Date: 18(chronic)  PT Insurance Information:  medicare and secondary [employer],   Total # of Visits to Date: 1  Plan of Care/Certification Expiration Date: 19                        See Medical History Questionnaire for information related to allergies and medications. Subjective:  Chart Reviewed: Yes  Patient assessed for rehabilitation services?: Yes  Family / Caregiver Present: Yes  Comments:  gradual decrease in  actiity over the  last few months,   gradual decrease in activity on the right worse than the left. Subjective: Pt without complaints. Denies falls  wife present during PT visit.        Pain:  Patient Currently in Pain: No        Social/Functional History:    Type

## 2019-07-08 ENCOUNTER — HOSPITAL ENCOUNTER (OUTPATIENT)
Dept: SPEECH THERAPY | Age: 74
Setting detail: THERAPIES SERIES
Discharge: HOME | End: 2019-07-08
Payer: MEDICARE

## 2019-07-08 PROCEDURE — 92523 SPEECH SOUND LANG COMPREHEN: CPT

## 2019-07-08 NOTE — FLOWSHEET NOTE
patient's speech is 'slower' and his voice is 'softer,' resulting in her understanding patient only ~50% of the time upon first communicative attempt. Later during the evaluation, patient did express some insight into deficits, stating 'My mind's a total blank. It's very frustrating. I rely on Gino Burk (Wife) for communication and I ask her what we're going to do every morning.' Wife states patient has a follow up with referring physician in October. Please see medical history questionnaire for information related to prior medical history, allergies and medications   Script special instructions indicates 'LSVT/PWR, PD specific, gait, balance, endurance, speech/language/cognition'      SOCIAL HISTORY: Patient lives at home with wife with wife presenting as patient's primary caregiver. Patient is not responsible for IADL tasks and routines, including medication and daily schedule management or driving. Wife reports patient is able to shower and brush his teeth independently but requires reminders to initiate these routines. She reports patient needs assist for upper body dressing and shaving due to physical limitations. Wife states she reviews calendar with patient daily and reviews daily events every morning. She reports patient is able to put dishes in the  and attends an exercise group once a week. Precautions: HTN, fall risk   Pain:  10/10 in back of neck and shoulders     Subjective:  Patient engaged in pleasantries but with little conversational initiation. Patient cooperative with tasks. Wife provided history of present illness but did not observe evaluation. Feedback provided to patient and spouse after evaluation.       ORAL MOTOR:  Labial / Facial:  Generalized weakness   Lingual: Decreased tonicity, range of motion, and strength; lingual tremor evident   Soft Palate: WFL  Sensation: DNT  Dentition: Missing posterior dentition     SPEECH / VOICE:  Diadochokinetic rates: Decreased GOALS:  Short-term Goals  Timeframe for Short-term Goals: 3 weeks  Goal 1: Patient will complete orientation, biographical information, and functional carryover tasks with 70% accuracy when provided mod cues for use of external aids and/or compensatory strategies to minimize confusion and increase particpation in home and community settings. Goal 2: Patient will complete basic safety awareness, problem solving, and sequencing tasks with 70% accuracy when provided mod cues to improve independence for ADL completion. Goal 3: Patient will execute 2 step commands 70% accuracy when provided min cues for improved understanding of directives in the therapy and home settings. Goal 4: Patient will complete sustained attention tasks with no more than 3 re-directions within 2-3 minute time frame to permit improved participation in therapy sessions and task progression to completion. Goal 5: Patient will tolerate further informal assessment of vocal intensity to establish goals for improved communicative intelligibility.          LONG TERM GOALS:  Long-term Goals  Timeframe for Long-term Goals: 6 weeks  Goal 1: Patient will improve cognitive linguistic functioning to moderate impairment level or higher to promote improved awareness and participation in home and community settings for optimal level of functionality. Goal 2: Patient will engage in 3 minute conversational sample with at least 90% intelligibility when provided no more than min cues to promote improved ability to express thoughts without difficulty.           TIME IN:  4880  TIME OUT:  1000  UNTIMED TREATMENT:  55  TIMED TREATMENT: 0  TOTAL TIME: 55 minutes          Fidelina Benavidez [address], [address]

## 2019-07-11 ENCOUNTER — HOSPITAL ENCOUNTER (OUTPATIENT)
Dept: OCCUPATIONAL THERAPY | Age: 74
Setting detail: THERAPIES SERIES
End: 2019-07-11
Payer: MEDICARE

## 2019-07-11 ENCOUNTER — APPOINTMENT (OUTPATIENT)
Dept: PHYSICAL THERAPY | Age: 74
End: 2019-07-11
Payer: MEDICARE

## 2019-07-15 ENCOUNTER — HOSPITAL ENCOUNTER (OUTPATIENT)
Dept: OCCUPATIONAL THERAPY | Age: 74
Setting detail: THERAPIES SERIES
Discharge: HOME | End: 2019-07-15
Payer: MEDICARE

## 2019-07-15 ENCOUNTER — HOSPITAL ENCOUNTER (OUTPATIENT)
Dept: SPEECH THERAPY | Age: 74
Setting detail: THERAPIES SERIES
Discharge: HOME | End: 2019-07-15
Payer: MEDICARE

## 2019-07-15 PROCEDURE — 92507 TX SP LANG VOICE COMM INDIV: CPT

## 2019-07-15 PROCEDURE — 97110 THERAPEUTIC EXERCISES: CPT

## 2019-07-15 ASSESSMENT — PAIN SCALES - GENERAL: PAINLEVEL_OUTOF10: 5

## 2019-07-15 ASSESSMENT — PAIN DESCRIPTION - ORIENTATION: ORIENTATION: LEFT

## 2019-07-15 ASSESSMENT — PAIN DESCRIPTION - LOCATION: LOCATION: SHOULDER

## 2019-07-15 NOTE — PROGRESS NOTES
[facility]  OUTPATIENT OCCUPATIONAL THERAPY  Daily Note  Luana Durant    Time In: 805  Time Out: 0845  Minutes: 40  Timed Code Treatment Minutes: 40 Minutes                Date: 7/15/2019  Patient Name: Nuha Chaney        CSN: [id number]   : 1945  (69 y.o.)  Gender: male   Referring Practitioner: Barbara Quigley CNP  Diagnosis: Lewy body dementia without behavioral disturbance G31.83, F02.80          General:  OT Visit Information  OT Insurance Information: Medicare, secondary [employer] Supplement, unlimited visits based on medical necessity, iontophoresis and hot/cold packs are not covered  Total # of Visits to Date: 2  Certification Period Expiration Date: 19  Progress Note Counter: 2/10 for PN       Restrictions/Precautions:  Restrictions/Precautions: General Precautions, Fall Risk              Subjective:  Subjective: Patient reports shoulder pain. Pain:  Patient Currently in Pain: Yes  Pain Assessment: 0-10  Pain Level: 5  Pain Location: Shoulder  Pain Orientation: Left       Objective:     Upper Extremity Function  UE AROM: scap retraction x 10 reps with slight facilitation from therapist, in sitting holding onto blue physioball and raising over head and then down to feet 5 reps x 2 sets, AROM B shoulder abduction x 5 reps in sitting, AROM opening and closing B hands x 10 reps, AROM holding onto physioball trunk twistx x 7 reps completed. UE PROM: massage to left anterior shoulder for tight musculature and pain control. PROM to left and right shoulders flexion, abdcution, IR and ER - tight in flexion, IR and ER  UE AAROM: Pulleys for shoulder flexion x 10 reps, supine dowel aaron chest press, shoulder flexion, horizontal abduction/adduction x 10 reps each                                                Activity Tolerance: Additional Comments: Patient tolerated session well. Assessment:  Assessment: Patien is progressing towards his goals.      Patient

## 2019-07-18 ENCOUNTER — HOSPITAL ENCOUNTER (OUTPATIENT)
Dept: SPEECH THERAPY | Age: 74
Setting detail: THERAPIES SERIES
Discharge: HOME | End: 2019-07-18
Payer: MEDICARE

## 2019-07-18 ENCOUNTER — HOSPITAL ENCOUNTER (OUTPATIENT)
Dept: PHYSICAL THERAPY | Age: 74
Setting detail: THERAPIES SERIES
Discharge: HOME | End: 2019-07-18
Payer: MEDICARE

## 2019-07-18 ENCOUNTER — HOSPITAL ENCOUNTER (OUTPATIENT)
Dept: OCCUPATIONAL THERAPY | Age: 74
Setting detail: THERAPIES SERIES
Discharge: HOME | End: 2019-07-18
Payer: MEDICARE

## 2019-07-18 PROCEDURE — 97110 THERAPEUTIC EXERCISES: CPT

## 2019-07-18 PROCEDURE — 92507 TX SP LANG VOICE COMM INDIV: CPT

## 2019-07-18 NOTE — PROGRESS NOTES
solving, and sequencing tasks with 70% accuracy when provided mod cues to improve independence for ADL completion. INTERVENTIONS: Did not address due to focus on other goals. Goal 3: Patient will execute 2 step commands 70% accuracy when provided min cues for improved understanding of directives in the therapy and home settings. INTERVENTIONS: Following 2 step verbal directions: 7/10 independent; 1/10 with min cues; 2/10 with mod cues     Goal 4: Patient will complete sustained attention tasks with no more than 3 re-directions within 2-3 minute time frame to permit improved participation in therapy sessions and task progression to completion. INTERVENTIONS: Did not address due to focus on other goals. Goal 5: Patient will read short functional phrases x10 repetitions with an average vocal intensity of 70 dB when provided min cues for improved intelligibility within running speech. INTERVENTIONS: Patient demonstrated to SLP that he has been completing voice HEP 2x a day. Patient sustained /a/ phonation x10 repetitions with an average vocal intensity of 78.9 dB when provided mod cues for increased vocal intensity. Vocal intensity varied significantly within individual vowel phonation trials. Patient read short 2-3 word phrases (greetings, pleasantries, etc) x10 repetitions with an average vocal intensity of 72.8 dB when provided mod cues for increased vocal intensity   Patient read longer, functional phrases x12 repetitions with an average vocal intensity of 68.1 dB when provided mod cues for increased vocal intensity        LONG TERM GOALS:  Long-term Goals  Timeframe for Long-term Goals: 6 weeks  Goal 1: Patient will improve cognitive linguistic functioning to moderate impairment level or higher to promote improved awareness and participation in home and community settings for optimal level of functionality.   ONGOING   Goal 2: Patient will engage in 3 minute conversational sample with at least 90% intelligibility when provided no more than min cues to promote improved ability to express thoughts without difficulty. ONGOING       Assessment: [x]Progressing towards goals []Not Progressing towards goals    Plan for Next Session: voice tasks; functional recall; sequencing     Patient Tolerance of Treatment:  [x]Tolerated well []Tolerated fair []Required rest breaks  []Fatigued    Education:  [x]Education was provided []Education not provided due to:  Learner:  [x]Patient [x]Significant Other []Other  Education provided regarding:  [x]Goals and POC []Diet and swallowing precautions    [x]Home Exercise Program: included functional phrases to practice at home as part of voice HEP  []Progress and/or discharge information  Method of Education: [x]Discussion [x]Demonstration [x]Handout []Other:  Evaluation of Education:  [x]Verbalized understanding []Demonstrates without assistance  []Demonstrates with assistance [x]Needs further instruction    []No evidence of learning  []No family present      Plan: [x]Continue with current plan of care []Modify current plan of care   []Progress note - frequency and duration:   []Discharge notes - reason for discharge:     [x]Patient continues to require treatment by a licensed therapist to address functional deficits as outlined in the established plan of care. Certification required: [] Yes - See Physician Certification below.       [x] No       Time in: 0930  Time out: 1000  Untimed treatment: 25  Timed treatment: 5  Total time: 30 minutes       Chase Benavidez [address]

## 2019-07-22 ENCOUNTER — HOSPITAL ENCOUNTER (OUTPATIENT)
Dept: SPEECH THERAPY | Age: 74
Setting detail: THERAPIES SERIES
Discharge: HOME | End: 2019-07-22
Payer: MEDICARE

## 2019-07-22 ENCOUNTER — HOSPITAL ENCOUNTER (OUTPATIENT)
Dept: OCCUPATIONAL THERAPY | Age: 74
Setting detail: THERAPIES SERIES
Discharge: HOME | End: 2019-07-22
Payer: MEDICARE

## 2019-07-22 ENCOUNTER — HOSPITAL ENCOUNTER (OUTPATIENT)
Dept: PHYSICAL THERAPY | Age: 74
Setting detail: THERAPIES SERIES
Discharge: HOME | End: 2019-07-22
Payer: MEDICARE

## 2019-07-22 PROCEDURE — 97110 THERAPEUTIC EXERCISES: CPT

## 2019-07-22 PROCEDURE — 97127 HC SP THER IVNTJ W/FOCUS COG FUNCJ: CPT

## 2019-07-22 NOTE — PROGRESS NOTES
[facility]  OUTPATIENT PHYSICAL THERAPY  DAILY NOTE  Kevin Suazo     Time In: 0800  Time Out: 7735  Minutes: 32  Timed Code Treatment Minutes: 32 Minutes                Date: 2019  Patient Name: Sarah Tapia,  Gender:  male        CSN: [id number]   : 1945  (69 y.o.)  Referral Date : 19    Referring Practitioner:  Claire Mar CNP      Diagnosis: Lewy body dementia without  behavioral disturbance  Treatment Diagnosis: gait dysfunction, decreased balance and strength,, right leg weakness. Additional Pertinent Hx: History HBP, incontinence, memory issues,                   General:  PT Visit Information  Onset Date: 18(chronic)  PT Insurance Information:  medicare and secondary [employer],   Total # of Visits to Date: 3  Plan of Care/Certification Expiration Date: 19               Subjective:  Chart Reviewed: Yes  Patient assessed for rehabilitation services?: Yes  Family / Caregiver Present: Yes  Comments:  gradual decrease in  actiity over the  last few months,   gradual decrease in activity on the right worse than the left. Subjective: slept a lot this weekend. denies low back pain. Pain:  Patient Currently in Pain: No         Objective              Exercises  Exercise 1: Nu step seat 9 , arms 12  5 min. Exercise 4: log roll for supine to sit   Exercise 6: 3 way hip x 10 B 10 reps  with counting. attempted t band -- unable to move with band on leg-- so continued without band. Exercise 7: rockerboard x 10 ML, AP  and balance for  15  sec.  with manual asist to touch edges. Exercise 9: stepping froward,  back , side x 15 with gait belt  and demonstration    Exercise 10: t band for UE and step stance, every other arm wtih moderate cues , attempted staggered step-- moderate cues needed,  Exercise 11: walking to side and forward x 12 feet with belt  and cue to move arms and BIG steps.             Activity Tolerance:  Activity Tolerance: Patient

## 2019-07-22 NOTE — PROGRESS NOTES
[address] THERAPY    [x] DAILY NOTE [] PROGRESS NOTE [] DISCHARGE NOTE    []Outpatient [address]   [][place] [facility]   [x][facility]              Date: 2019  Patient Name: Lydia Forrester      CSN: [id number]   : 1945  (69 y.o.)  Gender: male     Referring Physician:  Rozelle Kawasaki, CNP  Diagnosis: Lewy body dementia without behavioral disturbance   Secondary Diagnosis:  Dysphonia; dysarthria; cognitive deficits; expressive and receptive language deficits   Insurance/Certification Information: Medicare   Visit number / total approved visits:  4 - unlimited visits based on medical necessity   Visit count since last progress note: 4  Certification Date: 4/3/00  Last scheduled appointment: 19  Date of Last MBS:  N/A  Diet:  Regular diet with thin liquids   Pain: 0/10     Subjective: Patient pleasant. Patient did not bring memory binder to this session. Feedback provided to spouse following session. SHORT TERM GOALS:  Short-term Goals  Timeframe for Short-term Goals: 3 weeks  Goal 1: Patient will complete orientation, biographical information, and functional carryover tasks with 70% accuracy when provided mod cues for use of external aids and/or compensatory strategies to minimize confusion and increase particpation in home and community settings. INTERVENTIONS:    Orientation concepts:   Day of the week: min cues   Month: independent  Date: max cues required  Year: min cues     Orientation concepts following a 20 minute time delay:   Day of the week: required multiple choice cue   Month: independent   Date: min cues required  Year: independent   **Perseveration noted during orientation task. Patient completed short term memory task characterized by recall of 3 details about this therapist when provided mod assist for use of compensatory strategies.   Patient completed with the following results: See Physician Certification below.       [x] No       Time in: 0836  Time out: 0915  Untimed treatment: 7  Timed treatment: 32  Total time: 39 minutes       Edilberto Benavidez [address], [address]

## 2019-07-22 NOTE — PROGRESS NOTES
[address] [address]  OUTPATIENT OCCUPATIONAL THERAPY  Daily Note  Junita Screws    Time In: 5456  Time Out: 9365  Minutes: 40  Timed Code Treatment Minutes: 40 Minutes                Date: 2019  Patient Name: Evaristo De        CSN: [id number]   : 1945  (69 y.o.)  Gender: male   Referring Practitioner: Ruel Diggs CNP  Diagnosis: Lewy body dementia without behavioral disturbance G31.83, F02.80          General:  OT Visit Information  OT Insurance Information: Medicare, secondary [employer] Supplement, unlimited visits based on medical necessity, iontophoresis and hot/cold packs are not covered  Total # of Visits to Date: 4  Certification Period Expiration Date: 19  Progress Note Counter: 4/10 for PN       Restrictions/Precautions:  Restrictions/Precautions: General Precautions, Fall Risk              Subjective:  Subjective: Patient reports his nose is stuffy today. Pain:  Patient Currently in Pain: No       Objective:     Upper Extremity Function  UE PROM: PROM to left and right shoulders flexion, abdcution, IR and ER, massage to left lateral shoulder for tight musculatur and pain control  UE AAROM: Supine dowel aaron chest press, shoulder flexion, horizontal abduction/adduction x 10 reps each, In sitting, using physioball for B shoulder flexion, horizontal abduction/adduction and chest press x 10 reps each with demonstration  Coordination  Fine Motor: Fine motor coordination task of Perfection game with right hand and then left hand. While looking for correct shape in board, patient held left arm for extended time and reports some pain in left lateral deltoid area. Patient completed task with 20% trial and error. Activity Tolerance: Additional Comments: Patient tolerated session well. Assessment:  Assessment: Patien is progressing towards his goals. Patient Education:  Patient Education: Reviewed prior education.

## 2019-07-25 ENCOUNTER — HOSPITAL ENCOUNTER (OUTPATIENT)
Dept: OCCUPATIONAL THERAPY | Age: 74
Setting detail: THERAPIES SERIES
Discharge: HOME | End: 2019-07-25
Payer: MEDICARE

## 2019-07-25 ENCOUNTER — HOSPITAL ENCOUNTER (OUTPATIENT)
Dept: SPEECH THERAPY | Age: 74
Setting detail: THERAPIES SERIES
Discharge: HOME | End: 2019-07-25
Payer: MEDICARE

## 2019-07-25 ENCOUNTER — HOSPITAL ENCOUNTER (OUTPATIENT)
Dept: PHYSICAL THERAPY | Age: 74
Setting detail: THERAPIES SERIES
Discharge: HOME | End: 2019-07-25
Payer: MEDICARE

## 2019-07-25 PROCEDURE — 97110 THERAPEUTIC EXERCISES: CPT

## 2019-07-25 PROCEDURE — 97530 THERAPEUTIC ACTIVITIES: CPT

## 2019-07-25 PROCEDURE — 92507 TX SP LANG VOICE COMM INDIV: CPT

## 2019-07-25 NOTE — PROGRESS NOTES
[facility]  OUTPATIENT OCCUPATIONAL THERAPY  Daily Note  Don Ray    Time In: 0830  Time Out: 0900  Minutes: 30  Timed Code Treatment Minutes: 30 Minutes                Date: 2019  Patient Name: Kathrin Hendrix        CSN: [id number]   : 1945  (69 y.o.)  Gender: male   Referring Practitioner: Constantin Paul CNP  Diagnosis: Lewy body dementia without behavioral disturbance G31.83, F02.80          General:  OT Visit Information  Onset Date: 19  OT Insurance Information: Medicare, secondary [employer] Supplement, unlimited visits based on medical necessity, iontophoresis and hot/cold packs are not covered  Total # of Visits to Date: 5  Certification Period Expiration Date: 19  Progress Note Counter: 5/10 for PN       Restrictions/Precautions:  Restrictions/Precautions: General Precautions, Fall Risk              Subjective:  Subjective: Patient reports he has a cold/allergies. Pain:  Patient Currently in Pain: No       Objective:     Upper Extremity Function   UE AAROM: AAROM pulleys for shoulder flexion x 15 reps with cues to hold stretch longer, AAROM dowel aaron shoulder extension and IR in standing x 10 reps each. Tight with extension  UE Strengthing: Biodex at 120 RPM 4 minutes forward and 1 minutes backward - rosa well. Additional Activities Comment  Additional Activities: In standing, patient matched cards on board for standing endurance, fine motor coordination task with B hands. Minimal to moderate cues for task. Increased time required. Patient stood for 15 minutes for task. Occasional decreased attention noted. Activity Tolerance: Additional Comments: Patient tolerated session well. Assessment:  Assessment: Patien is progressing towards his goals. Patient Education:  Patient Education: Standing dowel aaron exercises for shoulder extension and IR.              Plan:  Plan Comment: Continue with current plan of care                   Meli Keenan OTR/L #[id number]

## 2019-07-29 ENCOUNTER — HOSPITAL ENCOUNTER (OUTPATIENT)
Dept: OCCUPATIONAL THERAPY | Age: 74
Setting detail: THERAPIES SERIES
Discharge: HOME | End: 2019-07-29
Payer: MEDICARE

## 2019-07-29 ENCOUNTER — HOSPITAL ENCOUNTER (OUTPATIENT)
Dept: PHYSICAL THERAPY | Age: 74
Setting detail: THERAPIES SERIES
Discharge: HOME | End: 2019-07-29
Payer: MEDICARE

## 2019-07-29 ENCOUNTER — HOSPITAL ENCOUNTER (OUTPATIENT)
Dept: SPEECH THERAPY | Age: 74
Setting detail: THERAPIES SERIES
Discharge: HOME | End: 2019-07-29
Payer: MEDICARE

## 2019-07-29 PROCEDURE — 92507 TX SP LANG VOICE COMM INDIV: CPT

## 2019-07-29 PROCEDURE — 97110 THERAPEUTIC EXERCISES: CPT

## 2019-07-29 NOTE — PROGRESS NOTES
[address] THERAPY    [x] DAILY NOTE [] PROGRESS NOTE [] DISCHARGE NOTE    []Outpatient [address]   [][place] [facility]   [x][facility]              Date: 2019  Patient Name: Marisela Chi      CSN: [id number]   : 1945  (69 y.o.)  Gender: male     Referring Physician:  Renetta De Leon CNP  Diagnosis: Lewy body dementia without behavioral disturbance   Secondary Diagnosis:  Dysphonia; dysarthria; cognitive deficits; expressive and receptive language deficits   Insurance/Certification Information: Medicare   Visit number / total approved visits:  6 - unlimited visits based on medical necessity   Visit count since last progress note: 6  Certification Date: 13  Last scheduled appointment: 19  Date of Last MBS:  N/A  Diet:  Regular diet with thin liquids   Pain: 0/10     Subjective: Patient cooperative, but reports 'my life is short term, if I don't want to do something, I just won't do it. ..what's the point?'  However, patient reports he 'enjoys' therapy and states that improving voicing is a therapy goal of his. SHORT TERM GOALS:  Short-term Goals  Timeframe for Short-term Goals: 3 weeks  Goal 1: Patient will complete orientation, biographical information, and functional carryover tasks with 70% accuracy when provided mod cues for use of external aids and/or compensatory strategies to minimize confusion and increase particpation in home and community settings. INTERVENTIONS:  Patient reports he has been referring to calendar with wife on a daily basis, but indicated he did not refer to calendar prior to therapy session.     Orientation concepts:   Day of the week: independent   Month: min cues   Date:  Max cues required   Year: independent     Recall of next treating therapist's name following 25 minute time delay: mod-max cues required     Recall of yesterday's events:  Spontaneous recall with wife

## 2019-07-29 NOTE — PROGRESS NOTES
rotation and SB x 3   Exercise 13: brigding x10 hold 5 sec-diff performing correctly, SLR ,  x10   Hamstring stretch x 3 for 15 sec  passive. Sidestepping x 4 laps  Cues to look up, posture, alignment     Activity Tolerance:  Activity Tolerance: Pt cued constantly to count out loud to A keeping track of reps. Assessment: Body structures, Functions, Activity limitations: Decreased functional mobility , Decreased high-level IADLs, Decreased strength, Decreased ROM, Decreased balance, Decreased endurance, Decreased cognition, Decreased ADL status  Assessment: Max cues for posture and to look up. Decreased righting reaction noted with retro lean-mod A to recover. Memory issues limiting factor towards progression of goal. Pleasant-diff following and maitaining commands due to cognition.    Prognosis: Fair  REQUIRES PT FOLLOW UP: Yes  Discharge Recommendations: Continue to assess pending progress    Patient Education:   HEP, take big steps, posture, look up                      Plan:  Plan Comment: cont with POC    Goals:  Patient goals : less falls, move better    Short term goals  Time Frame for Short term goals: 5 weeks   Short term goal 1: improve addison balance  score from a 36 to 40   Short term goal 2: increase strength B  LE by 1/2 muscle grade     Short term goal 3: increase ROM hip flex to 120, hip abd to 30 to assist with dressing     Short term goal 4: pat will perform sit to stand for 3 trial with contact guard assist for safely with minimal vc   Short term goal 5: new goal-- improve posture to have less seveere forward head to increase Ability to turn his head in stance to 50% of normal rotation yo assist with weight shifting in stance    Long term goals  Time Frame for Long term goals : 10 weeks  Long term goal 1: inprove addison scale to 36 to lessen risk of falls    Long term goal 2: increase B LE strength to 4+ to allow for sit to stand with supervison especially the right LE   Long term goal 3: pat

## 2019-08-01 ENCOUNTER — HOSPITAL ENCOUNTER (OUTPATIENT)
Dept: PHYSICAL THERAPY | Age: 74
Setting detail: THERAPIES SERIES
Discharge: HOME | End: 2019-08-01
Payer: MEDICARE

## 2019-08-01 ENCOUNTER — HOSPITAL ENCOUNTER (OUTPATIENT)
Dept: SPEECH THERAPY | Age: 74
Setting detail: THERAPIES SERIES
Discharge: HOME | End: 2019-08-01
Payer: MEDICARE

## 2019-08-01 ENCOUNTER — HOSPITAL ENCOUNTER (OUTPATIENT)
Dept: OCCUPATIONAL THERAPY | Age: 74
Setting detail: THERAPIES SERIES
Discharge: HOME | End: 2019-08-01
Payer: MEDICARE

## 2019-08-01 PROCEDURE — 97110 THERAPEUTIC EXERCISES: CPT

## 2019-08-01 PROCEDURE — 97530 THERAPEUTIC ACTIVITIES: CPT

## 2019-08-01 PROCEDURE — 97127 HC SP THER IVNTJ W/FOCUS COG FUNCJ: CPT

## 2019-08-01 NOTE — PROGRESS NOTES
cues and visual reminder via sound level meter on the table during the session. LONG TERM GOALS:   Long-term Goals  Timeframe for Long-term Goals: 6 weeks  Goal 1: Patient will improve cognitive linguistic functioning to moderate impairment level or higher to promote improved awareness and participation in home and community settings for optimal level of functionality. ONGOING   Goal 2: Patient will engage in 3 minute conversational sample with at least 90% intelligibility when provided no more than min cues to promote improved ability to express thoughts without difficulty. ONGOING       Assessment: [x]Progressing towards goals []Not Progressing towards goals    Plan for Next Session: voice tasks; 2 step commands; functional carryover     Patient Tolerance of Treatment:  []Tolerated well [x]Tolerated fair: some frustration verbalized  []Required rest breaks  []Fatigued    Education:  [x]Education was provided []Education not provided due to:  Learner:  [x]Patient [x]Significant Other (after the session)  []Other  Education provided regarding:  [x]Goals and POC []Diet and swallowing precautions    [x]Home Exercise Program:    []Progress and/or discharge information  Method of Education: [x]Discussion []Demonstration []Handout []Other:  Evaluation of Education:  [x]Verbalized understanding []Demonstrates without assistance  []Demonstrates with assistance [x]Needs further instruction    []No evidence of learning  []No family present      Plan: [x]Continue with current plan of care []Modify current plan of care   []Progress note - frequency and duration:   []Discharge notes - reason for discharge:     [x]Patient continues to require treatment by a licensed therapist to address functional deficits as outlined in the established plan of care. Certification required: [] Yes - See Physician Certification below.       [x] No       Time in: 0935  Time out: 1015  Untimed treatment: 7  Timed treatment: 33  Total time:

## 2019-08-01 NOTE — PROGRESS NOTES
[address] [address]  OUTPATIENT OCCUPATIONAL THERAPY  Daily Note  Vericept    Time In: 8738  Time Out: 1130  Minutes: 45  Timed Code Treatment Minutes: 45 Minutes                Date: 2019  Patient Name: Moody Alvarez        CSN: [id number]   : 1945  (69 y.o.)  Gender: male   Referring Practitioner: Syl Jimenez CNP  Diagnosis: Lewy body dementia without behavioral disturbance G31.83, F02.80          General:  OT Visit Information  Onset Date: 19  OT Insurance Information: Medicare, secondary [employer] Supplement, unlimited visits based on medical necessity, iontophoresis and hot/cold packs are not covered  Total # of Visits to Date: 7  Certification Period Expiration Date: 19  Progress Note Counter: 7/10 for PN  Comments: script dated 19 for OT/PT/ST for gait, balance, endurance, speech language/cognition       Restrictions/Precautions:  Restrictions/Precautions: General Precautions, Fall Risk              Subjective:  Subjective: Patient is pleasant and cooperative, nothing new to report. Pain:  Patient Currently in Pain: No(No pain at rest today. )       Objective:     Upper Extremity Function  UE AROM: Standing holding medium blue physioball trunk rotations to tap the ball on each side of him on the mat x10 reps. UE PROM: Supine PROM to bilateral shoulders all motions to tolerance - more tight in R shoulder than L.   UE AAROM: Supine dowel aaron chest press x10, flexion full arc of motion x10.   UE Stretching: Seated edge of mat feet on small step on the floor, supine on wedge with towel roll at thoracic spine and shoulder blades arms out to the side to pec stretch, thoracic extension stretch, cervical stretch held x2 minutes. Additional Activities Comment  Additional Activities:  In standing, patient matched cards on board for standing endurance task and coordination, demonstrates ability to stand x17 minutes without fatigue to

## 2019-08-05 ENCOUNTER — HOSPITAL ENCOUNTER (OUTPATIENT)
Dept: SPEECH THERAPY | Age: 74
Setting detail: THERAPIES SERIES
Discharge: HOME | End: 2019-08-05
Payer: MEDICARE

## 2019-08-05 ENCOUNTER — HOSPITAL ENCOUNTER (OUTPATIENT)
Dept: OCCUPATIONAL THERAPY | Age: 74
Setting detail: THERAPIES SERIES
Discharge: HOME | End: 2019-08-05
Payer: MEDICARE

## 2019-08-05 ENCOUNTER — HOSPITAL ENCOUNTER (OUTPATIENT)
Dept: PHYSICAL THERAPY | Age: 74
Setting detail: THERAPIES SERIES
Discharge: HOME | End: 2019-08-05
Payer: MEDICARE

## 2019-08-05 PROCEDURE — 97110 THERAPEUTIC EXERCISES: CPT

## 2019-08-05 PROCEDURE — 92507 TX SP LANG VOICE COMM INDIV: CPT

## 2019-08-05 NOTE — PROGRESS NOTES
[facility]  OUTPATIENT OCCUPATIONAL THERAPY  Daily Note  Laron Garcia    Time In: 7038  Time Out: 0930  Minutes: 44  Timed Code Treatment Minutes: 44 Minutes                Date: 2019  Patient Name: Nolan Hollingsworth        CSN: [id number]   : 1945  (69 y.o.)  Gender: male   Referring Practitioner: Alexandra Hernandez CNP  Diagnosis: Lewy body dementia without behavioral disturbance G31.83, F02.80          General:  OT Visit Information  OT Insurance Information: Medicare, secondary [employer] Supplement, unlimited visits based on medical necessity, iontophoresis and hot/cold packs are not covered  Total # of Visits to Date: 8  Progress Note Counter: 8/10 for PN       Restrictions/Precautions:  Restrictions/Precautions: General Precautions, Fall Risk              Subjective:  Subjective: Patient pleasant and cooperative. Nothing new to report. Pain:  Patient Currently in Pain: No       Objective:     Upper Extremity Function  UE PROM: Supine PROM to bilateral shoulders all motions to tolerance   UE AAROM: Pulleys for shoulder flexion, Supine dowel aaron chest press x 10, flexion full arc of motion x 10, horizontal abduction x 10 reps with cues and assist to keep arms up. UE Strengthing: Biodex at 100 RPM 2 minutes forward and 3 minutes backward - rosa well. Patient recalled when to reverse direction. Coordination  Fine Motor: Fine motor coordination task using [place] marbles, scooping all up out of dish with with one hand and then feeding them out one at a time with right and then left hand. Min cues for directions and occasional use of opposite hand noted. Additional Activities Comment  Additional Activities: Sitting at edge of mat with feet on small step stool, patient used smal physioball for shoulder flexion and then hip flexion to floor x 10 reps and horizontal abduction/adduction x 10 reps                  Activity Tolerance:   Additional Comments:

## 2019-08-08 ENCOUNTER — HOSPITAL ENCOUNTER (OUTPATIENT)
Dept: SPEECH THERAPY | Age: 74
Setting detail: THERAPIES SERIES
Discharge: HOME | End: 2019-08-08
Payer: MEDICARE

## 2019-08-08 ENCOUNTER — HOSPITAL ENCOUNTER (OUTPATIENT)
Dept: OCCUPATIONAL THERAPY | Age: 74
Setting detail: THERAPIES SERIES
Discharge: HOME | End: 2019-08-08
Payer: MEDICARE

## 2019-08-08 ENCOUNTER — HOSPITAL ENCOUNTER (OUTPATIENT)
Dept: PHYSICAL THERAPY | Age: 74
Setting detail: THERAPIES SERIES
Discharge: HOME | End: 2019-08-08
Payer: MEDICARE

## 2019-08-08 PROCEDURE — 97110 THERAPEUTIC EXERCISES: CPT

## 2019-08-08 PROCEDURE — 97127 HC SP THER IVNTJ W/FOCUS COG FUNCJ: CPT

## 2019-08-08 ASSESSMENT — PAIN DESCRIPTION - ORIENTATION: ORIENTATION: LEFT

## 2019-08-08 ASSESSMENT — PAIN DESCRIPTION - LOCATION: LOCATION: KNEE

## 2019-08-08 ASSESSMENT — PAIN SCALES - GENERAL: PAINLEVEL_OUTOF10: 3

## 2019-08-08 NOTE — PROGRESS NOTES
required cues to include zip code   Phone number: mod cues required     Recall of previous treating therapist's name: max cues  Recall of previous treating therapist's affiliated department: required multiple choice cue   Recall of next treating therapist's name and affiliated department following 35 minute time delay: independent with recall of therapist's name; required multiple choice cue to recall affiliated department     Patient completed short term memory task characterized by recall of 3 associated pictures when provided mod cues for use of association and 2-3 minutes for initial encoding of visual information. Following a 3 minute time delay, patient independently recalled 2/3 pictures and required min cues to recall 1/3 items. Goal 2: Patient will complete basic safety awareness, problem solving, and sequencing tasks with 70% accuracy when provided mod cues to improve independence for ADL completion. INTERVENTIONS:  ID effects/outcomes to problem scenarios:  6/8 independent; 1/8 with min cues; 1/8 with mod cues      Goal 3: Patient will execute 2 step commands 70% accuracy when provided min cues for improved understanding of directives in the therapy and home settings. INTERVENTIONS:  Did not address due to focus on other goals. Goal 4: Patient will complete sustained attention tasks with no more than 3 re-directions within 2-3 minute time frame to permit improved participation in therapy sessions and task progression to completion. INTERVENTIONS: Patient completed sustained attention task characterized by counting by multiples of '2' from 2-100. Patient completed with x1 un-identified error, x2 self-corrections of errors, and required min cues x2 to generate the next number. Patient required a total of 6 redirections back to task due to breakdowns in attention and working retention. This task took 5.25 minutes to complete.  Patient verbalized frustration with the task, stating 'my head

## 2019-08-12 ENCOUNTER — HOSPITAL ENCOUNTER (OUTPATIENT)
Dept: SPEECH THERAPY | Age: 74
Setting detail: THERAPIES SERIES
Discharge: HOME | End: 2019-08-12
Payer: MEDICARE

## 2019-08-12 ENCOUNTER — HOSPITAL ENCOUNTER (OUTPATIENT)
Dept: PHYSICAL THERAPY | Age: 74
Setting detail: THERAPIES SERIES
Discharge: HOME | End: 2019-08-12
Payer: MEDICARE

## 2019-08-12 ENCOUNTER — HOSPITAL ENCOUNTER (OUTPATIENT)
Dept: OCCUPATIONAL THERAPY | Age: 74
Setting detail: THERAPIES SERIES
Discharge: HOME | End: 2019-08-12
Payer: MEDICARE

## 2019-08-12 PROCEDURE — 97110 THERAPEUTIC EXERCISES: CPT

## 2019-08-12 PROCEDURE — 92507 TX SP LANG VOICE COMM INDIV: CPT

## 2019-08-12 ASSESSMENT — PAIN DESCRIPTION - LOCATION: LOCATION: SHOULDER

## 2019-08-12 ASSESSMENT — PAIN SCALES - GENERAL: PAINLEVEL_OUTOF10: 3

## 2019-08-12 ASSESSMENT — PAIN DESCRIPTION - ORIENTATION: ORIENTATION: RIGHT

## 2019-08-12 NOTE — PROGRESS NOTES
[facility]  OUTPATIENT PHYSICAL THERAPY  DAILY NOTE  Wiliam Bertrand     Time In: 7408  Time Out: 1100  Minutes: 42  Timed Code Treatment Minutes: 42 Minutes                Date: 2019  Patient Name: Mason Schroeder,  Gender:  male        CSN: [id number]   : 1945  (69 y.o.)       Referring Practitioner:  Dagmar Curling CNP      Diagnosis: Lewy body dementia without  behavioral disturbance  Treatment Diagnosis: gait dysfunction, decreased balance and strength,, right leg weakness. Additional Pertinent Hx: History HBP, incontinence, memory issues,                   General:  PT Visit Information  Onset Date: 18  PT Insurance Information:  medicare and secondary [employer],   Total # of Visits to Date: 9  Plan of Care/Certification Expiration Date: 19               Subjective:  Chart Reviewed: Yes  Response To Previous Treatment: Patient with no complaints from previous session. Subjective: no complaints. Pain:  Patient Currently in Pain: No         Objective                 Exercises  Exercise 1: Nu step seat 9 , arms 12  5 min. Exercise 5:  on blue foam-- heel toe raise 0 UE support and, marching and squats x 15 B with UE suppport  cues to count out loud due to poor abiltiy to keep track of reps otherwisw  Exercise 6: 3 way hip x 10 B 10 reps  with counting. Exercise 7: rockerboard x 10 ML, AP  and balance for  15  sec. Exercise 8: NK table 2.5  15 flex and ext   Exercise 9: stepping forward,  back , side x 15 with gait belt  and demonstration    Exercise 10: step up 4 inch without hand x 10 each foot    Exercise 11: walking to side and forward x 12 feet x 4 reps. with belt  and cue to move arms and BIG steps. Exercise 12: supine  2 pillows   chin tucks with cues   rotation and SB x 3   AA   Exercise 13: brigding x10 hold 5 sec-diff performing correctly,   Hamstring stretch x 3 for 15 sec  passive.  single knee to chest 15 sec x 3. lower trunk rotation x

## 2019-08-12 NOTE — PROGRESS NOTES
[address] THERAPY      [] DAILY NOTE [x] PROGRESS NOTE [] DISCHARGE NOTE    [][facility]   [][place] [facility]   [x][facility]              Date: 2019  Patient Name: Gabi Espinosa      CSN: [id number]   : 1945  (69 y.o.)  Gender: male     Referring Physician:  Elysia Gaston CNP  Diagnosis: Lewy body dementia without behavioral disturbance   Secondary Diagnosis:  Dysphonia; dysarthria; cognitive deficits; expressive and receptive language deficits   Insurance/Certification Information: Medicare   Visit number / total approved visits:  10 - unlimited visits based on medical necessity   Visit count since last progress note: 10  Certification Date:   Last scheduled appointment: 19  Date of Last MBS:  N/A  Diet:  Regular diet with thin liquids   Pain: 310 in right shoulder      Subjective:  Patient pleasant. Patient's spouse present for latter portion of session for progress note discussion and goal planning. Spouse supportive. SHORT TERM GOALS:  Short-term Goals  Timeframe for Short-term Goals: 3 weeks  Goal 1: Patient will complete orientation, biographical information, and functional carryover tasks with 70% accuracy when provided mod cues for use of external aids and/or compensatory strategies to minimize confusion and increase particpation in home and community settings. GOAL NOT MET. CONTINUE GOAL.    INTERVENTIONS:       Orientation concepts:   Day of the week: cued referral to calendar + mod cues    Month: independent   Date:  Required cued referral to calendar + mod-max cues   Year: required cued referral to calendar but identified the information without further assist     PREVIOUS SESSION:   Orientation concepts:   Day of the week: independent   Month: required cued referral to calendar but identified the information without further assist   Date:  Required cued referral to no more than 3 re-directions within 2-3 minute time frame to permit improved participation in therapy sessions and task progression to completion. GOAL NOT MET. CONTINUE GOAL. INTERVENTIONS: Did not address due to focus on other goals. PREVIOUS SESSION:   Patient completed sustained attention task characterized by counting by multiples of '2' from 2-100. Patient completed with x1 un-identified error, x2 self-corrections of errors, and required min cues x2 to generate the next number. Patient required a total of 6 redirections back to task due to breakdowns in attention and working retention. This task took 5.25 minutes to complete. Patient verbalized frustration with the task, stating 'my head goes blank' and 'this is shameful', which resulted in loss of attention to task. After task completion, patient reported 'it's depressing, but it's a matter of working to get it back.'      Goal 5: Patient will read short functional phrases x10 repetitions with an average vocal intensity of 70 dB when provided min cues for improved intelligibility within running speech. GOAL MET. NEW GOAL: Patient will engage in 1 minute conversational sample with an average vocal intensity of 68+ dB when provided min cues for improved conversational intelligibility when communicating with spouse. INTERVENTIONS:     Patient sustained /a/ phonation x10 repetitions with an average vocal intensity of 82.1 dB when provided min cues for increased vocal intensity. Patient repeated 10 functional phrases 3x each with an average vocal intensity of 71.6 dB when provided min verbal cues for increased vocal intensity   3 minute conversational sample: average vocal intensity of 64.3 dB when provided min cues for increased vocal intensity    **Patient demonstrated good self-awareness of instances of reduced vocal intensity, stating 'when I get stumped, my volume goes way down' after an instance of word finding difficulty in conversation. []Discharge notes - reason for discharge:     [x]Patient continues to require treatment by a licensed therapist to address functional deficits as outlined in the established plan of care. Certification required: [] Yes - See Physician Certification above.       [x] No       Time in: 0930  Time out: 1015  Untimed treatment: 40  Timed treatment: 5  Total time: 45 minutes       Faustina Benavidez [address], [address]

## 2019-08-12 NOTE — FLOWSHEET NOTE
shoulder flexion and horizontal abduction/adduction x 10 reps each  UE Strengthing: Biodex at 95 RPM 2 minutes forward and 3 minutes backward - rosa well. One cue to reverse direction. Additional Activities Comment  Additional Activities: Goal assessment completed. Patient worked on 9 hole peg board for bilateral fine motor coordination task. right hand completed in 44 seconds, left in 41 seconds. Activity Tolerance: Additional Comments: Patient tolerated session well. Assessment:  Assessment: Patient has made improvements in fine motor coordination on the left and right. Patient reports good follow through with his HEP with wife's supervision. Patient does require help yet to shane his shirt due to limited ROM but is able to dress the lower part of his body with increased time per his wife. Patient has some improvements in bilateral shoulder ROM but it is still limited. Recommend continued OT to increase bilateral UE AROM, fine motor coordination and continued exploration of adaptive techniques for ADL ease. Patient Education:  Patient Education: Plan of care            Plan:       Patient goals : Increase endurance and coordination    Short term goals  Time Frame for Short term goals: 5 weeks  Short term goal 1: Patient will increase right UE AROM shoulder fleixon to 110, abduction to 85 degrees and ER to touch to his right ear and increase elbow extension to (-) 15 degrees for increased ease with dressing. GOAL PARTIALLY MET. Right UE AROM shoulder flexion = 107, abduction = 87, ER = patient able to touch to right ear but not back of head, elbow extension = (-) 20 degrees from extension. REVISED GOAL:  Patient will increase right UE AROM shoulder flexion to 115, abduction to 86, ER to touch to behind right ear and elbow extension to (-) 15 degrees for increased ease with dressing.     Short term goal 2: Patient will increase left UE AROM shoulder

## 2019-08-15 ENCOUNTER — HOSPITAL ENCOUNTER (OUTPATIENT)
Dept: PHYSICAL THERAPY | Age: 74
Setting detail: THERAPIES SERIES
Discharge: HOME | End: 2019-08-15
Payer: MEDICARE

## 2019-08-15 ENCOUNTER — HOSPITAL ENCOUNTER (OUTPATIENT)
Dept: SPEECH THERAPY | Age: 74
Setting detail: THERAPIES SERIES
Discharge: HOME | End: 2019-08-15
Payer: MEDICARE

## 2019-08-15 ENCOUNTER — HOSPITAL ENCOUNTER (OUTPATIENT)
Dept: OCCUPATIONAL THERAPY | Age: 74
Setting detail: THERAPIES SERIES
Discharge: HOME | End: 2019-08-15
Payer: MEDICARE

## 2019-08-15 PROCEDURE — 97127 HC SP THER IVNTJ W/FOCUS COG FUNCJ: CPT

## 2019-08-15 PROCEDURE — 97110 THERAPEUTIC EXERCISES: CPT

## 2019-08-15 NOTE — PROGRESS NOTES
therapist's name; required multiple choice cue to recall affiliated department      Patient completed short term memory task characterized by recall of 3 item food order list when provided mod cues for use of self-repetition and visualization strategies. Immediate recall: 2/3 items recalled independently; required max cues to recall 1/3 items   5 minute time delay: 3/3 items recalled independently   Additional 20 minute time delay: 3/3 items recalled independently     Goal 2:  Patient will complete basic safety awareness, problem solving, and sequencing tasks with 80% accuracy when provided min cues to improve independence for ADL completion. INTERVENTIONS:  ID which item is more expensive field of 2 when provided visual stimuli of target costs: 2/2 independent   ID which item is least expensive field of 3 when provided visual stimuli of target costs: 2/2 independent   ID which set of coin values is more, field of 2 when provided visual stimuli of coin values written in words:  3/5 independent; 1/5 with min cues; 1/5 with mod-max cues (patient with increased difficulty with task when presented 3 different types of coins within each value)    Goal 3: Patient will execute 2 step commands 70% accuracy when provided min cues for improved understanding of directives in the therapy and home settings. INTERVENTIONS: Did not address due to focus on other goals. Goal 4: Patient will complete sustained attention tasks with no more than 3 re-directions within 2-3 minute time frame to permit improved participation in therapy sessions and task progression to completion. INTERVENTIONS:  Did not address due to focus on other goals. Goal 5: Patient will engage in 1 minute conversational sample with an average vocal intensity of 68+ dB when provided min cues for improved conversational intelligibility when communicating with spouse.     INTERVENTIONS:     Patient sustained /a/ phonation x10 repetitions with an average vocal intensity of 82 dB when provided min cues for increased vocal intensity. **Patient with frequent dry coughing during sustained phonation tasks. Provided drink of water and a break for patient - coughing subsided. LONG TERM GOALS:   Long-term Goals  Timeframe for Long-term Goals: 3 weeks  Goal 1: Patient will improve cognitive linguistic functioning to moderate impairment level or higher to promote improved awareness and participation in home and community settings for optimal level of functionality. ONGOING  Goal 2: Patient will engage in 3 minute conversational sample with an average vocal intensity of 67+ dB when provided min cues for improved conversational intelligibility.   ONGOING      Assessment: [x]Progressing towards goals []Not Progressing towards goals      Plan for Next Session: voice tasks; functional carryover/orientation; 2 step commands     Patient Tolerance of Treatment:  [x]Tolerated well []Tolerated fair   []Required rest breaks  []Fatigued    Education:  [x]Education was provided []Education not provided due to:  Learner:  [x]Patient [x]Significant Other (after the session)  []Other  Education provided regarding:  [x]Goals and POC []Diet and swallowing precautions    [x]Home Exercise Program: functional activities to address basic money management skills     []Progress and/or discharge information  Method of Education: [x]Discussion [x]Demonstration []Handout []Other:  Evaluation of Education:  [x]Verbalized understanding []Demonstrates without assistance  []Demonstrates with assistance [x]Needs further instruction    []No evidence of learning  []No family present      Plan: [x]Continue with current plan of care []Modify current plan of care   []Progress note - frequency and duration: 2x/week for 3 weeks (until completion of certification period)    []Discharge notes - reason for discharge:     [x]Patient continues to require treatment by a licensed therapist to address

## 2019-08-15 NOTE — PROGRESS NOTES
lower trunk rotation x 10 reps. Exercise 14: Hamstring strech at steps 3 x 15 sec ea         Activity Tolerance:  Activity Tolerance: Patient Tolerated treatment well    Assessment: Body structures, Functions, Activity limitations: Decreased functional mobility , Decreased high-level IADLs, Decreased strength, Decreased ROM, Decreased balance, Decreased endurance, Decreased cognition, Decreased ADL status  Assessment: moderate stiffness  Prognosis: Good       Patient Education:                         Plan:  Plan Comment: cont with POC    Goals:  Patient goals : less falls, move better    Short term goals  Time Frame for Short term goals: 5 weeks   Short term goal 1: improve addison balance  score from a 36 to 40   Short term goal 2: increase strength B  LE by 1/2 muscle grade     Short term goal 3: increase ROM hip flex to 120, hip abd to 30 to assist with dressing     Short term goal 4: pat will perform sit to stand for 3 trial with contact guard assist for safely with minimal vc   Short term goal 5: new goal-- improve posture to have less seveere forward head to increase Ability to turn his head in stance to 50% of normal rotation yo assist with weight shifting in stance    Long term goals  Time Frame for Long term goals : 10 weeks  Long term goal 1: inprove addison scale to 36 to lessen risk of falls    Long term goal 2: increase B LE strength to 4+ to allow for sit to stand with supervison especially the right LE   Long term goal 3: pat will walk with increased step length and minimal hesitation with turning corners.    Long term goal 4: ZUHAIR Villa, PT

## 2019-08-19 ENCOUNTER — HOSPITAL ENCOUNTER (OUTPATIENT)
Dept: PHYSICAL THERAPY | Age: 74
Setting detail: THERAPIES SERIES
Discharge: HOME | End: 2019-08-19
Payer: MEDICARE

## 2019-08-19 ENCOUNTER — HOSPITAL ENCOUNTER (OUTPATIENT)
Dept: OCCUPATIONAL THERAPY | Age: 74
Setting detail: THERAPIES SERIES
Discharge: HOME | End: 2019-08-19
Payer: MEDICARE

## 2019-08-19 ENCOUNTER — HOSPITAL ENCOUNTER (OUTPATIENT)
Dept: SPEECH THERAPY | Age: 74
Setting detail: THERAPIES SERIES
Discharge: HOME | End: 2019-08-19
Payer: MEDICARE

## 2019-08-19 PROCEDURE — 97110 THERAPEUTIC EXERCISES: CPT

## 2019-08-19 PROCEDURE — 92507 TX SP LANG VOICE COMM INDIV: CPT

## 2019-08-19 NOTE — PROGRESS NOTES
assistance [x]Needs further instruction    []No evidence of learning  [x]No family present      Plan: [x]Continue with current plan of care []Modify current plan of care   []Progress note - frequency and duration: 2x/week for 3 weeks (until completion of certification period)    []Discharge notes - reason for discharge:     [x]Patient continues to require treatment by a licensed therapist to address functional deficits as outlined in the established plan of care. Certification required: [] Yes - See Physician Certification above.       [x] No       Time in: 0847  Time out: 0930  Untimed treatment: 36  Timed treatment: 7  Total time: 43 minutes       Garrison Benavidez [address], [address]

## 2019-08-22 ENCOUNTER — HOSPITAL ENCOUNTER (OUTPATIENT)
Dept: OCCUPATIONAL THERAPY | Age: 74
Setting detail: THERAPIES SERIES
Discharge: HOME | End: 2019-08-22
Payer: MEDICARE

## 2019-08-22 ENCOUNTER — HOSPITAL ENCOUNTER (OUTPATIENT)
Dept: PHYSICAL THERAPY | Age: 74
Setting detail: THERAPIES SERIES
Discharge: HOME | End: 2019-08-22
Payer: MEDICARE

## 2019-08-22 ENCOUNTER — HOSPITAL ENCOUNTER (OUTPATIENT)
Dept: SPEECH THERAPY | Age: 74
Setting detail: THERAPIES SERIES
Discharge: HOME | End: 2019-08-22
Payer: MEDICARE

## 2019-08-22 PROCEDURE — 97110 THERAPEUTIC EXERCISES: CPT

## 2019-08-22 PROCEDURE — 97127 HC SP THER IVNTJ W/FOCUS COG FUNCJ: CPT

## 2019-08-22 NOTE — PROGRESS NOTES
5 sec-diff performing correctly,   Hamstring stretch x 3 for 15 sec  passive. single knee to chest 15 sec x 3. lower trunk rotation x 10 reps. Activity Tolerance:  Activity Tolerance: Patient Tolerated treatment well    Assessment: Body structures, Functions, Activity limitations: Decreased functional mobility , Decreased high-level IADLs, Decreased strength, Decreased ROM, Decreased balance, Decreased endurance, Decreased cognition, Decreased ADL status  Prognosis: Good  Discharge Recommendations: Continue to assess pending progress    Patient Education:  Patient Education: ther ex                       Plan:  Times per week: 2  Plan weeks: 10  Specific instructions for Next Treatment: exercise, right leg strengthening, gait training,  passive stretching B hamstrings  and right hip flexion. Plan Comment: cont with POC    Goals:  Patient goals : less falls, move better    Short term goals  Time Frame for Short term goals: 5 weeks   Short term goal 1: improve addison balance  score from a 36 to 40   Short term goal 2: increase strength B  LE by 1/2 muscle grade     Short term goal 3: increase ROM hip flex to 120, hip abd to 30 to assist with dressing     Short term goal 4: pat will perform sit to stand for 3 trial with contact guard assist for safely with minimal vc   Short term goal 5: new goal-- improve posture to have less seveere forward head to increase Ability to turn his head in stance to 50% of normal rotation yo assist with weight shifting in stance    Long term goals  Time Frame for Long term goals : 10 weeks  Long term goal 1: inprove addison scale to 36 to lessen risk of falls    Long term goal 2: increase B LE strength to 4+ to allow for sit to stand with supervison especially the right LE   Long term goal 3: pat will walk with increased step length and minimal hesitation with turning corners.    Long term goal 4: ZUHAIR Kraft  [id number]

## 2019-08-22 NOTE — PROGRESS NOTES
[address] [place] THERAPY      [x] DAILY NOTE [] PROGRESS NOTE [] DISCHARGE NOTE    []Outpatient Via [email or web address] Text[facility] 60   [][facility]   [x][facility]              Date: 2019  Patient Name: Sarah Tapia      CSN: [id number]   : 1945  (69 y.o.)  Gender: male     Referring Physician:  Charlie Medina CNP  Diagnosis: Lewy body dementia without behavioral disturbance   Secondary Diagnosis:  Dysphonia; dysarthria; cognitive deficits; expressive and receptive language deficits   Insurance/Certification Information: Medicare   Visit number / total approved visits:  15 - unlimited visits based on medical necessity   Visit count since last progress note: 3  Certification Date: 22  Last scheduled appointment: 19  Date of Last MBS:  N/A  Diet:  Regular diet with thin liquids   Pain: 0/10     Subjective:  Patient pleasant and cooperative, reports feeling better this date. SHORT TERM GOALS:  Short-term Goals  Timeframe for Short-term Goals: 3 weeks  Goal 1: Patient will complete orientation, biographical information, and functional carryover tasks with 70% accuracy when provided mod cues for use of external aids and/or compensatory strategies to minimize confusion and increase particpation in home and community settings.    INTERVENTIONS:      Orientation concepts:   Day of the week: independent   Month:  Required cued referral to calendar but no further assist   Date:  Required cued referral to calendar + mod-max cues to identify information   Year: independent      Recall of biographical information:   Address: independent   Phone number: min cues required       Recall of previous treating therapist's name: independent   Recall of next treating therapist's name and affiliated department following 35 minute time delay: required mod cues to recall therapist's name and multiple choice cue to recall affiliated department

## 2019-08-22 NOTE — PROGRESS NOTES
Education:  Patient Education: No new education. Plan:  Plan Comment: Continue with current plan of care.                     Veronica Forrester, OTR/L #[id number]

## 2019-08-26 ENCOUNTER — HOSPITAL ENCOUNTER (OUTPATIENT)
Dept: OCCUPATIONAL THERAPY | Age: 74
Setting detail: THERAPIES SERIES
Discharge: HOME | End: 2019-08-26
Payer: MEDICARE

## 2019-08-26 ENCOUNTER — HOSPITAL ENCOUNTER (OUTPATIENT)
Dept: PHYSICAL THERAPY | Age: 74
Setting detail: THERAPIES SERIES
Discharge: HOME | End: 2019-08-26
Payer: MEDICARE

## 2019-08-26 ENCOUNTER — HOSPITAL ENCOUNTER (OUTPATIENT)
Dept: SPEECH THERAPY | Age: 74
Setting detail: THERAPIES SERIES
Discharge: HOME | End: 2019-08-26
Payer: MEDICARE

## 2019-08-26 PROCEDURE — 97110 THERAPEUTIC EXERCISES: CPT

## 2019-08-26 PROCEDURE — 92507 TX SP LANG VOICE COMM INDIV: CPT

## 2019-08-26 ASSESSMENT — PAIN SCALES - GENERAL: PAINLEVEL_OUTOF10: 4

## 2019-08-26 ASSESSMENT — PAIN DESCRIPTION - LOCATION: LOCATION: HIP

## 2019-08-26 ASSESSMENT — PAIN DESCRIPTION - ORIENTATION: ORIENTATION: RIGHT

## 2019-08-26 NOTE — PROGRESS NOTES
forward 12 cm (5 inches)   9.  Object from Floor from a Standing Position Able to  slipper safely and easily   10. Turning to Look Behind Over Left and Right Shoulders While Standing Looks behind one side only other side shows less weight shift   11. Turn 360 Degrees Able to turn 360 degrees safely but slowly   12. Place Alternate Foot on Step or Stool While Standing Unsupported Able to stand independently and safely and complete 8 steps in 20 seconds   13. Standing Unsupported One Foot in Front Needs help to step but can hold 15 seconds   14. Standing on One Leg Tries to lift leg unable to hold 3 seconds but remains standing independently   Ariza Balance Score 45 (calculated)   Ariza Balance Disability Index 1-19% (calculated)   Ariza CMS Modifier CI (calculated)     Gait-- forward, no loss in balacne,  Mild decrease in step length-- backwards  Needs cues to take big steps and lost balacne to 2 times with self correct in 12 feet. Exercises  Exercise 1: Nu step seat 9 , arms 12  5 min. Exercise 5:  on blue foam-- heel toe raise 10 UE support and, marching and squats x 15 B with UE suppport   Exercise 6: 3 way hip x 10 B 15 reps  with counting. Exercise 7: rockerboard x 10 ML, AP  and balance for  15  sec. Exercise 8: NK table 2.5  15 flex and ext   Exercise 9: stepping forward,  back , side x 15 with gait belt and demonstration    Exercise 10: step up 4 inch without hand x 15 side,  forawrd step up to  next sstep with CGA and back down without UE   Exercise 11: walking to side 12 feet x 4 reps. in ll bars  back wards with CGA 1 and cues to end     Exercise 12: supine  2 pillows   chin tucks with cues  rotation and SB x 3   AA   Exercise 13: bridging x10 hold 5 sec-diff performing correctly. Hamstring stretch x 3 for 15 sec  passive. single knee to chest 15 sec x 3. lower trunk rotation x 10 reps.         Ariza Balance Score: 45  Activity Tolerance:  Activity Tolerance: Patient Tolerated

## 2019-08-26 NOTE — PROGRESS NOTES
[address] [address]  OUTPATIENT OCCUPATIONAL THERAPY  Daily Note  Ashley Davon    Time In: 0800  Time Out: 0840  Minutes: 40  Timed Code Treatment Minutes: 40 Minutes                Date: 2019  Patient Name: Michael Hills        CSN: [id number]   : 1945  (69 y.o.)  Gender: male   Referring Practitioner: Alba Gomez CNP  Diagnosis: Lewy body dementia without behavioral disturbance G31.83, F02.80          General:  OT Visit Information  Onset Date: 19  OT Insurance Information: Medicare, secondary Hum[employer] Supplement, unlimited visits based on medical necessity, iontophoresis and hot/cold packs are not covered  Total # of Visits to Date: 13  Certification Period Expiration Date: 19  Progress Note Counter: 4/10 for PN       Restrictions/Precautions:  Restrictions/Precautions: General Precautions, Fall Risk              Subjective:  Subjective: Patient states he thinks he slept on his hip wrong. Pain:  Patient Currently in Pain: Yes  Pain Assessment: 0-10  Pain Level: 4  Pain Location: Hip  Pain Orientation: Right       Objective:     Upper Extremity Function  UE AROM: Supine on mat, holding small blue physioball and bringing it over head into flexion x 10 reps, chest press with physioball x 10 reps, horizontal abduction/adduction x 10 reps  UE PROM: Supine PROM to bilateral shoulders all motions to tolerance, PROM right elbow extension/flexion with impaired extension noted  UE AAROM: Pulleys for shoulder flexion and scaption to warm up, Supine dowel aaron for chest press with some shakiness noted, shoulder flexion and horizontal abduction/adduction x 10 reps each with min assist from therapist for increased ROM  UE Strengthing: Biodex at 90 RPM 3 minutes forward and 3 minutes backward with 1 cue to reverse directions - rosa well.                                Additional Activities Comment  Additional Activities: Patient unbuttoned 2 buttons on his shirt with slight

## 2019-08-29 ENCOUNTER — HOSPITAL ENCOUNTER (OUTPATIENT)
Dept: SPEECH THERAPY | Age: 74
Setting detail: THERAPIES SERIES
Discharge: HOME | End: 2019-08-29
Payer: MEDICARE

## 2019-08-29 ENCOUNTER — HOSPITAL ENCOUNTER (OUTPATIENT)
Dept: PHYSICAL THERAPY | Age: 74
Setting detail: THERAPIES SERIES
Discharge: HOME | End: 2019-08-29
Payer: MEDICARE

## 2019-08-29 ENCOUNTER — HOSPITAL ENCOUNTER (OUTPATIENT)
Dept: OCCUPATIONAL THERAPY | Age: 74
Setting detail: THERAPIES SERIES
Discharge: HOME | End: 2019-08-29
Payer: MEDICARE

## 2019-08-29 PROCEDURE — 97127 HC SP THER IVNTJ W/FOCUS COG FUNCJ: CPT

## 2019-08-29 PROCEDURE — 97110 THERAPEUTIC EXERCISES: CPT

## 2019-08-29 NOTE — FLOWSHEET NOTE
speech therapy 1x a week to address continued carryover of voice skills into the home setting and to improve use of strategies/external aids as needed to maximize independence and participation in home and community settings. Plan for Next Session: voice tasks; 2 step directions     Patient Tolerance of Treatment:  [x]Tolerated well []Tolerated fair   []Required rest breaks  []Fatigued    Education:  [x]Education was provided []Education not provided due to:  Learner:  [x]Patient [x]Significant Other   []Other  Education provided regarding:  [x]Goals and POC []Diet and swallowing precautions    [x]Home Exercise Program:HEP 2x a day      [x]Progress and/or discharge information  Method of Education: [x]Discussion []Demonstration []Handout []Other:  Evaluation of Education:  [x]Verbalized understanding []Demonstrates without assistance  []Demonstrates with assistance [x]Needs further instruction    []No evidence of learning  []No family present      Plan: []Continue with current plan of care []Modify current plan of care   [x]Progress note - frequency and duration: 1x/week for 3 weeks     []Discharge notes - reason for discharge:     [x]Patient continues to require treatment by a licensed therapist to address functional deficits as outlined in the established plan of care. Certification required: [x] Yes - See Physician Certification above.       [] No       Time in: 0855  Time out: 0930  Untimed treatment: 0  Timed treatment: 35  Total time: 35 minutes       Duke Rider 87 [address]

## 2019-08-29 NOTE — PROGRESS NOTES
endurance, Decreased cognition, Decreased ADL status  Prognosis: Good  REQUIRES PT FOLLOW UP: Yes  Discharge Recommendations: Continue to assess pending progress    Patient Education:  Patient Education: ther ex                       Plan:  Times per week: 2  Plan weeks: 3  Specific instructions for Next Treatment: exercise, right leg strengthening, gait training,  passive stretching B hamstrings  and right hip flexion. got to [employer] - encourage HEP   Current Treatment Recommendations: Strengthening, Balance Training, ROM, Functional Mobility Training, ADL/Self-care Training, Positioning, Modalities, Home Exercise Program, Safety Education & Training, Neuromuscular Re-education, Gait Training  Plan Comment: cont with POC    Goals:  Patient goals : less falls, move better    Short term goals  Time Frame for Short term goals: 5 weeks   Short term goal 1: improve addison balance  score from a 36 to 40  MET    Short term goal 2: increase strength B  LE by 1/2 muscle grade    MET     Short term goal 3: increase ROM hip flex to 120, hip abd to 30 to assist with dressing    R 115, L 118  partially met   Short term goal 4: pat will perform sit to stand for 3 trial with contact guard assist for safely with minimal vc   MET    Short term goal 5: new goal-- improve posture to have less severe forward head to increase Ability to turn his head in stance to 50% of normal rotation yo assist with weight shifting in stance  Partially met  now at 50% rotation, still haiving moderate forward head.       Long term goals  Time Frame for Long term goals : 10 weeks  Long term goal 1: inprove addison scale to 55   (previous note typing mistake corrected on 8-26   stated 36, not 46)- corrected  to lessen risk of falls    Long term goal 2: increase B LE strength to 4+ to allow for sit to stand with supervison especially the right LE Partially met   Long term goal 3: pat will walk with increased step length and minimal hesitation with turning

## 2019-08-29 NOTE — DISCHARGE SUMMARY
[facility] THERAPY  Discharge Note  Neeraj Venegas    Time In: 930  Time Out: 5  Minutes: 45  Timed Code Treatment Minutes: 45 Minutes                Date: 2019  Patient Name: Chantale Adhikari        CSN: [id number]   : 1945  (69 y.o.)  Gender: male   Referring Practitioner: Poonam Romero CNP  Diagnosis: Lewy body dementia without behavioral disturbance G31.83, F02.80          General:  OT Visit Information  Onset Date: 19  OT Insurance Information: Medicare, secondary Hum[employer] Supplement, unlimited visits based on medical necessity, iontophoresis and hot/cold packs are not covered  Total # of Visits to Date: 12  Certification Period Expiration Date: 19  Progress Note Counter: 5/10 for PN       Restrictions/Precautions:  Restrictions/Precautions: General Precautions, Fall Risk              Subjective:  Subjective: Patient states he is feeling stronger. Patient's wife states patient is doing well and she is helping him with his exercises. Pain:  Patient Currently in Pain: No       Objective:     Upper Extremity Function  UE AROM: AROM bilateral shoulder flexion x 5 reps with cues to try to go full range of motion, AROM B abduction x 5 reps with occasional assist for increase ROM, AROM diaganols up x 5 reps with B arms, Scap retraction x 10 reps, Seated at edge of mat with feet on small stool, patient raised small physioball over head and then down to feet and then right and left x 10 reps each  UE AAROM: Pulleys for shoulder flexion and scaption to warm up  UE Stretching: wall slides x 5 reps with 5 second hold for right and left shoulder flexion  UE Strengthing: UBE in [facility] x 2 minutes forward and 3 minutes backward at .5 resistance. Demonstrated to patient and his wife how to adjust seat, height of handles and how to start. Discused safe technique to transfer on and off UBE.                                Additional Activities Comment  Additional Activities: Goal assessment completed. Activity Tolerance: Additional Comments: Patient tolerated session well. Assessment:  Assessment: Patient has made fair/good progress towards his goals. Patient demonstrates improved fine motor coordination. Patient completes his HEP with his wife's supervision. Patient and patient's wife have been educated on adaptive equipment and where to purchase and techniques for ease with dressing and grooming. Patient's active shoulder ROM has not increased; however, patient voices feeling stronger. Patient and his wife are agreeable to discharge from OT and continue with HEP on their own. Patient Education:  Patient Education: HEP review, Patient and patient's wife - use of UBE in [employer] for safe transfers, seat and handle adjustment and how to start. Plan:  Plan Comment: Discharge from OT with patient to complete HEP with supervision of his wife. Patient is also participating in Parkinson's group 1 time a week. Patient goals : Increase endurance and coordination    Short term goals  Time Frame for Short term goals: 5 weeks  Short term goal 1: Patient will increase right UE AROM shoulder flexion to 115, abduction to 86, ER to touch to behind right ear and elbow extension to (-) 15 degrees for increased ease with dressing. GOAL NOT MET. Right UE AROM shoulder flexion = 90, abduction = 81, ER = able to touch to behind his right ear if arm in adducted position, right elbow extension = (-) 25 degrees. Discharge Goal.   Short term goal 2: Patient will increase left UE AROM shoulder flexion to 123 and abduction to 100 degrees for increased ease with dressing. GOAL NOT MET. Left AROM sholder flexion = 100, abduction = 91 degrees.   Discharge Goal.   Short term goal 3: Patient will increase fine motor coordination as demonstrated by 9 hole peg test on the right in 40 seconds and on the left 38 seconds

## 2019-09-05 ENCOUNTER — HOSPITAL ENCOUNTER (OUTPATIENT)
Dept: SPEECH THERAPY | Age: 74
Setting detail: THERAPIES SERIES
Discharge: HOME | End: 2019-09-05
Payer: MEDICARE

## 2019-09-05 PROCEDURE — 92507 TX SP LANG VOICE COMM INDIV: CPT

## 2019-09-05 NOTE — PROGRESS NOTES
spouse. ONGOING      Assessment: [x]Progressing towards goals []Not Progressing towards goals      Plan for Next Session: voice tasks; functional carryover; problem solving; sustained attention     Patient Tolerance of Treatment:  [x]Tolerated well []Tolerated fair   []Required rest breaks  []Fatigued    Education:  [x]Education was provided []Education not provided due to:  Learner:  [x]Patient [x]Significant Other (after the session) []Other  Education provided regarding:  [x]Goals and POC []Diet and swallowing precautions    []Home Exercise Program     [x]Progress and/or discharge information  Method of Education: [x]Discussion []Demonstration []Handout []Other:  Evaluation of Education:  [x]Verbalized understanding []Demonstrates without assistance  []Demonstrates with assistance [x]Needs further instruction    []No evidence of learning  []No family present      Plan: [x]Continue with current plan of care []Modify current plan of care   []Progress note - frequency and duration: 1x/week for 3 weeks     []Discharge notes - reason for discharge:     [x]Patient continues to require treatment by a licensed therapist to address functional deficits as outlined in the established plan of care. Certification required: [] Yes - See Physician Certification above.       [x] No       Time in: 1000  Time out: 1035  Untimed treatment: 30  Timed treatment: 5  Total time: 35 minutes       Jeniffer Benavidez Teres[address], [address] [facility]

## 2019-09-06 ENCOUNTER — HOSPITAL ENCOUNTER (OUTPATIENT)
Dept: PHYSICAL THERAPY | Age: 74
Setting detail: THERAPIES SERIES
Discharge: HOME | End: 2019-09-06
Payer: MEDICARE

## 2019-09-06 PROCEDURE — 97110 THERAPEUTIC EXERCISES: CPT

## 2019-09-06 NOTE — PROGRESS NOTES
with increased step length and minimal hesitation with turning corners.  Partially met  cont goal  Long term goal 4: I HEP partially met     Mel Vargass  [id number]

## 2019-09-09 ENCOUNTER — HOSPITAL ENCOUNTER (OUTPATIENT)
Dept: SPEECH THERAPY | Age: 74
Setting detail: THERAPIES SERIES
Discharge: HOME | End: 2019-09-09
Payer: MEDICARE

## 2019-09-09 ENCOUNTER — HOSPITAL ENCOUNTER (OUTPATIENT)
Dept: PHYSICAL THERAPY | Age: 74
Setting detail: THERAPIES SERIES
Discharge: HOME | End: 2019-09-09
Payer: MEDICARE

## 2019-09-09 PROCEDURE — 97127 HC SP THER IVNTJ W/FOCUS COG FUNCJ: CPT

## 2019-09-09 PROCEDURE — 97116 GAIT TRAINING THERAPY: CPT

## 2019-09-09 PROCEDURE — 97110 THERAPEUTIC EXERCISES: CPT

## 2019-09-09 NOTE — PROGRESS NOTES
[address] THERAPY      [x] DAILY NOTE [] PROGRESS NOTE [] DISCHARGE NOTE    []Outpatient [address]   [][place] [facility]   [x][facility]              Date: 2019  Patient Name: Marisela Chi      CSN: [id number]   : 1945  (69 y.o.)  Gender: male     Referring Physician:  Renetta De Leon CNP  Diagnosis: Lewy body dementia without behavioral disturbance   Secondary Diagnosis:  Dysphonia; dysarthria; cognitive deficits; expressive and receptive language deficits   Insurance/Certification Information: Medicare   Visit number / total approved visits:  16 - unlimited visits based on medical necessity   Visit count since last progress note: 2  Certification Date:    Last scheduled appointment: 19   Date of Last MBS:  N/A  Diet:  Regular diet with thin liquids   Pain: 0/10     Subjective:  Patient pleasant and cooperative, but reports he felt sick the last couple of days, including symptoms of being 'extremely tired' and a cough. Patient states he feels better this date. SHORT TERM GOALS:  Short-term Goals  Timeframe for Short-term Goals: 3 weeks  Goal 1: Patient will complete orientation, biographical information, and functional carryover tasks with 70% accuracy when provided mod cues for use of external aids and/or compensatory strategies to minimize confusion and increase particpation in home and community settings. INTERVENTIONS:      Orientation concepts:   Day of the week: min cues required   Month: Independent   Date:  required cued referral to calendar + mod-max cues to identify   Year: independent      Patient completed short term memory task characterized by recall of 3 ingredients (self-generated by patient) to make one of his favorite meals when provided min cues for use of visualization strategy.   Patient completed with the following results:    Immediate recall: 2/3 ingredients recalled 1015  Untimed treatment: 7  Timed treatment: 38  Total time: 45 minutes       Portillo Benavidez Tereso [address], [address]

## 2019-09-12 ENCOUNTER — HOSPITAL ENCOUNTER (OUTPATIENT)
Dept: PHYSICAL THERAPY | Age: 74
Setting detail: THERAPIES SERIES
Discharge: HOME | End: 2019-09-12
Payer: MEDICARE

## 2019-09-12 PROCEDURE — 97110 THERAPEUTIC EXERCISES: CPT

## 2019-09-12 NOTE — PROGRESS NOTES
strength, Decreased ROM, Decreased balance, Decreased endurance, Decreased cognition, Decreased ADL status  Prognosis: Good  Discharge Recommendations: Continue to assess pending progress    Patient Education:  Patient Education: ther ex                       Plan:  Times per week: 2  Plan weeks: 3  Specific instructions for Next Treatment: exercise, right leg strengthening, gait training,  passive stretching B hamstrings  and right hip flexion. got to [employer] - encourage HEP   Current Treatment Recommendations: Strengthening, Balance Training, ROM, Functional Mobility Training, ADL/Self-care Training, Positioning, Modalities, Home Exercise Program, Safety Education & Training, Neuromuscular Re-education, Gait Training  Plan Comment: cont with POC    Goals:  Patient goals : less falls, move better    Short term goals  Time Frame for Short term goals: 5 weeks   Short term goal 1: improve addison balance  score from a 36 to 40  MET    Short term goal 2: increase strength B  LE by 1/2 muscle grade    MET     Short term goal 3: increase ROM hip flex to 120, hip abd to 30 to assist with dressing    R 115, L 118  partially met   Short term goal 4: pat will perform sit to stand for 3 trial with contact guard assist for safely with minimal vc   MET    Short term goal 5: new goal-- improve posture to have less severe forward head to increase Ability to turn his head in stance to 50% of normal rotation yo assist with weight shifting in stance  Partially met  now at 50% rotation, still haiving moderate forward head.       Long term goals  Time Frame for Long term goals : 10 weeks  Long term goal 1: inprove addison scale to 55   (previous note typing mistake corrected on 8-26   stated 36, not 46)- corrected  to lessen risk of falls    Long term goal 2: increase B LE strength to 4+ to allow for sit to stand with supervison especially the right LE Partially met   Long term goal 3: pat will walk with increased step length and minimal hesitation with turning corners.  Partially met  cont goal  Long term goal 4: I HEP partially met     Dipti Stuart  [id number] Epidermal Autograft Text: The defect edges were debeveled with a #15 scalpel blade.  Given the location of the defect, shape of the defect and the proximity to free margins an epidermal autograft was deemed most appropriate.  Using a sterile surgical marker, the primary defect shape was transferred to the donor site. The epidermal graft was then harvested.  The skin graft was then placed in the primary defect and oriented appropriately.

## 2019-09-16 ENCOUNTER — HOSPITAL ENCOUNTER (OUTPATIENT)
Dept: PHYSICAL THERAPY | Age: 74
Setting detail: THERAPIES SERIES
Discharge: HOME | End: 2019-09-16
Payer: MEDICARE

## 2019-09-16 ENCOUNTER — HOSPITAL ENCOUNTER (OUTPATIENT)
Dept: SPEECH THERAPY | Age: 74
Setting detail: THERAPIES SERIES
Discharge: HOME | End: 2019-09-16
Payer: MEDICARE

## 2019-09-16 PROCEDURE — 97110 THERAPEUTIC EXERCISES: CPT

## 2019-09-16 PROCEDURE — 92507 TX SP LANG VOICE COMM INDIV: CPT

## 2019-09-16 NOTE — DISCHARGE SUMMARY
Plan:  Times per week: discharge , cont with HEP     Goals:  Patient goals : less falls, move better    Short term goals  Time Frame for Short term goals: 5 weeks   Short term goal 1: improve addison balance  score from a 36 to 40  MET    Short term goal 2: increase strength B  LE by 1/2 muscle grade    MET     Short term goal 3: increase ROM hip flex to 120, hip abd to 30 to assist with dressing   MET   Short term goal 4: pat will perform sit to stand for 3 trial with contact guard assist for safely with minimal vc   MET    Short term goal 5: new goal-- improve posture to have less severe forward head to increase Ability to turn his head in stance to 50% of normal rotation yo assist with weight shifting in stance  Partially met  now at 50% rotation, he contineus to presetn with forward head posturing    Long term goals  Time Frame for Long term goals : 10 weeks  Long term goal 1: inprove addison scale to 46 to lessen risk of falls    MET  now at 49,   Long term goal 2: increase B LE strength to 4+ to allow for sit to stand with supervison especially the right LE   MET   Long term goal 3: pat will walk with increased step length and minimal hesitation with turning corners.  MET    Long term goal 4: I HEP MET with wife assist    Ric Mcdonald, PT

## 2020-01-01 ENCOUNTER — HOSPITAL ENCOUNTER (OUTPATIENT)
Dept: OCCUPATIONAL THERAPY | Age: 75
Setting detail: THERAPIES SERIES
Discharge: HOME | End: 2020-11-12
Payer: MEDICARE

## 2020-01-01 ENCOUNTER — HOSPITAL ENCOUNTER (OUTPATIENT)
Dept: PHYSICAL THERAPY | Age: 75
Setting detail: THERAPIES SERIES
Discharge: HOME | End: 2020-09-17
Payer: MEDICARE

## 2020-01-01 ENCOUNTER — HOSPITAL ENCOUNTER (OUTPATIENT)
Dept: OCCUPATIONAL THERAPY | Age: 75
Setting detail: THERAPIES SERIES
Discharge: HOME | End: 2020-10-12
Payer: MEDICARE

## 2020-01-01 ENCOUNTER — HOSPITAL ENCOUNTER (OUTPATIENT)
Dept: SPEECH THERAPY | Age: 75
Setting detail: THERAPIES SERIES
Discharge: HOME | End: 2020-10-15
Payer: MEDICARE

## 2020-01-01 ENCOUNTER — HOSPITAL ENCOUNTER (OUTPATIENT)
Dept: SPEECH THERAPY | Age: 75
Setting detail: THERAPIES SERIES
Discharge: HOME | End: 2020-09-21
Payer: MEDICARE

## 2020-01-01 ENCOUNTER — HOSPITAL ENCOUNTER (OUTPATIENT)
Dept: OCCUPATIONAL THERAPY | Age: 75
Setting detail: THERAPIES SERIES
Discharge: HOME | End: 2020-10-15
Payer: MEDICARE

## 2020-01-01 ENCOUNTER — HOSPITAL ENCOUNTER (OUTPATIENT)
Dept: SPEECH THERAPY | Age: 75
Setting detail: THERAPIES SERIES
Discharge: HOME | End: 2020-10-19
Payer: MEDICARE

## 2020-01-01 ENCOUNTER — HOSPITAL ENCOUNTER (OUTPATIENT)
Dept: PHYSICAL THERAPY | Age: 75
Setting detail: THERAPIES SERIES
Discharge: HOME | End: 2020-10-12
Payer: MEDICARE

## 2020-01-01 ENCOUNTER — HOSPITAL ENCOUNTER (OUTPATIENT)
Dept: PHYSICAL THERAPY | Age: 75
Setting detail: THERAPIES SERIES
Discharge: HOME | End: 2020-11-19
Payer: MEDICARE

## 2020-01-01 ENCOUNTER — APPOINTMENT (OUTPATIENT)
Dept: PHYSICAL THERAPY | Age: 75
End: 2020-01-01
Payer: MEDICARE

## 2020-01-01 ENCOUNTER — HOSPITAL ENCOUNTER (OUTPATIENT)
Dept: SPEECH THERAPY | Age: 75
Setting detail: THERAPIES SERIES
Discharge: HOME | End: 2020-11-12
Payer: MEDICARE

## 2020-01-01 ENCOUNTER — HOSPITAL ENCOUNTER (EMERGENCY)
Age: 75
Discharge: HOME | End: 2020-08-07
Payer: MEDICARE

## 2020-01-01 ENCOUNTER — HOSPITAL ENCOUNTER (OUTPATIENT)
Dept: OCCUPATIONAL THERAPY | Age: 75
Setting detail: THERAPIES SERIES
Discharge: HOME | End: 2020-12-10
Payer: MEDICARE

## 2020-01-01 ENCOUNTER — HOSPITAL ENCOUNTER (OUTPATIENT)
Dept: SPEECH THERAPY | Age: 75
Setting detail: THERAPIES SERIES
Discharge: HOME | End: 2020-11-02
Payer: MEDICARE

## 2020-01-01 ENCOUNTER — APPOINTMENT (OUTPATIENT)
Dept: OCCUPATIONAL THERAPY | Age: 75
End: 2020-01-01
Payer: MEDICARE

## 2020-01-01 ENCOUNTER — HOSPITAL ENCOUNTER (OUTPATIENT)
Dept: OCCUPATIONAL THERAPY | Age: 75
Setting detail: THERAPIES SERIES
Discharge: HOME | End: 2020-09-17
Payer: MEDICARE

## 2020-01-01 ENCOUNTER — APPOINTMENT (OUTPATIENT)
Dept: CT IMAGING | Age: 75
End: 2020-01-01
Payer: MEDICARE

## 2020-01-01 ENCOUNTER — HOSPITAL ENCOUNTER (OUTPATIENT)
Dept: OCCUPATIONAL THERAPY | Age: 75
Setting detail: THERAPIES SERIES
Discharge: HOME | End: 2020-10-29
Payer: MEDICARE

## 2020-01-01 ENCOUNTER — HOSPITAL ENCOUNTER (OUTPATIENT)
Dept: OCCUPATIONAL THERAPY | Age: 75
Setting detail: THERAPIES SERIES
Discharge: HOME | End: 2020-11-05
Payer: MEDICARE

## 2020-01-01 ENCOUNTER — HOSPITAL ENCOUNTER (OUTPATIENT)
Dept: OCCUPATIONAL THERAPY | Age: 75
Setting detail: THERAPIES SERIES
Discharge: HOME | End: 2020-12-03
Payer: MEDICARE

## 2020-01-01 ENCOUNTER — APPOINTMENT (OUTPATIENT)
Dept: GENERAL RADIOLOGY | Age: 75
End: 2020-01-01
Payer: MEDICARE

## 2020-01-01 ENCOUNTER — HOSPITAL ENCOUNTER (OUTPATIENT)
Dept: OCCUPATIONAL THERAPY | Age: 75
Setting detail: THERAPIES SERIES
Discharge: HOME | End: 2020-11-02
Payer: MEDICARE

## 2020-01-01 ENCOUNTER — HOSPITAL ENCOUNTER (OUTPATIENT)
Dept: SPEECH THERAPY | Age: 75
Setting detail: THERAPIES SERIES
Discharge: HOME | End: 2020-11-23
Payer: MEDICARE

## 2020-01-01 ENCOUNTER — HOSPITAL ENCOUNTER (OUTPATIENT)
Dept: OCCUPATIONAL THERAPY | Age: 75
Setting detail: THERAPIES SERIES
Discharge: HOME | End: 2020-11-18
Payer: MEDICARE

## 2020-01-01 ENCOUNTER — HOSPITAL ENCOUNTER (OUTPATIENT)
Dept: SPEECH THERAPY | Age: 75
Setting detail: THERAPIES SERIES
Discharge: HOME | End: 2020-11-09
Payer: MEDICARE

## 2020-01-01 ENCOUNTER — HOSPITAL ENCOUNTER (OUTPATIENT)
Dept: PHYSICAL THERAPY | Age: 75
Setting detail: THERAPIES SERIES
Discharge: HOME | End: 2020-11-30
Payer: MEDICARE

## 2020-01-01 ENCOUNTER — HOSPITAL ENCOUNTER (OUTPATIENT)
Dept: PHYSICAL THERAPY | Age: 75
Setting detail: THERAPIES SERIES
Discharge: HOME | End: 2020-10-26
Payer: MEDICARE

## 2020-01-01 ENCOUNTER — HOSPITAL ENCOUNTER (OUTPATIENT)
Dept: SPEECH THERAPY | Age: 75
Setting detail: THERAPIES SERIES
Discharge: HOME | End: 2020-11-05
Payer: MEDICARE

## 2020-01-01 ENCOUNTER — HOSPITAL ENCOUNTER (OUTPATIENT)
Dept: OCCUPATIONAL THERAPY | Age: 75
Setting detail: THERAPIES SERIES
Discharge: HOME | End: 2020-11-30
Payer: MEDICARE

## 2020-01-01 ENCOUNTER — HOSPITAL ENCOUNTER (OUTPATIENT)
Dept: PHYSICAL THERAPY | Age: 75
Setting detail: THERAPIES SERIES
Discharge: HOME | End: 2020-11-05
Payer: MEDICARE

## 2020-01-01 ENCOUNTER — HOSPITAL ENCOUNTER (OUTPATIENT)
Dept: SPEECH THERAPY | Age: 75
Setting detail: THERAPIES SERIES
Discharge: HOME | End: 2020-10-30
Payer: MEDICARE

## 2020-01-01 ENCOUNTER — HOSPITAL ENCOUNTER (OUTPATIENT)
Dept: OCCUPATIONAL THERAPY | Age: 75
Setting detail: THERAPIES SERIES
Discharge: HOME | End: 2020-11-25
Payer: MEDICARE

## 2020-01-01 ENCOUNTER — HOSPITAL ENCOUNTER (OUTPATIENT)
Dept: PHYSICAL THERAPY | Age: 75
Setting detail: THERAPIES SERIES
Discharge: HOME | End: 2020-12-07
Payer: MEDICARE

## 2020-01-01 ENCOUNTER — HOSPITAL ENCOUNTER (OUTPATIENT)
Dept: PHYSICAL THERAPY | Age: 75
Setting detail: THERAPIES SERIES
Discharge: HOME | End: 2020-12-03
Payer: MEDICARE

## 2020-01-01 ENCOUNTER — APPOINTMENT (OUTPATIENT)
Dept: SPEECH THERAPY | Age: 75
End: 2020-01-01
Payer: MEDICARE

## 2020-01-01 ENCOUNTER — HOSPITAL ENCOUNTER (OUTPATIENT)
Dept: OCCUPATIONAL THERAPY | Age: 75
Setting detail: THERAPIES SERIES
Discharge: HOME | End: 2020-11-20
Payer: MEDICARE

## 2020-01-01 ENCOUNTER — HOSPITAL ENCOUNTER (OUTPATIENT)
Dept: PHYSICAL THERAPY | Age: 75
Setting detail: THERAPIES SERIES
Discharge: HOME | End: 2020-10-19
Payer: MEDICARE

## 2020-01-01 ENCOUNTER — HOSPITAL ENCOUNTER (OUTPATIENT)
Dept: PHYSICAL THERAPY | Age: 75
Setting detail: THERAPIES SERIES
Discharge: HOME | End: 2020-10-09
Payer: MEDICARE

## 2020-01-01 ENCOUNTER — HOSPITAL ENCOUNTER (OUTPATIENT)
Dept: PHYSICAL THERAPY | Age: 75
Setting detail: THERAPIES SERIES
Discharge: HOME | End: 2020-11-12
Payer: MEDICARE

## 2020-01-01 ENCOUNTER — HOSPITAL ENCOUNTER (OUTPATIENT)
Dept: SPEECH THERAPY | Age: 75
Setting detail: THERAPIES SERIES
Discharge: HOME | End: 2020-11-30
Payer: MEDICARE

## 2020-01-01 ENCOUNTER — HOSPITAL ENCOUNTER (OUTPATIENT)
Dept: PHYSICAL THERAPY | Age: 75
Setting detail: THERAPIES SERIES
Discharge: HOME | End: 2020-11-09
Payer: MEDICARE

## 2020-01-01 ENCOUNTER — HOSPITAL ENCOUNTER (OUTPATIENT)
Dept: PHYSICAL THERAPY | Age: 75
Setting detail: THERAPIES SERIES
Discharge: HOME | End: 2020-10-15
Payer: MEDICARE

## 2020-01-01 ENCOUNTER — HOSPITAL ENCOUNTER (OUTPATIENT)
Dept: SPEECH THERAPY | Age: 75
Setting detail: THERAPIES SERIES
Discharge: HOME | End: 2020-11-16
Payer: MEDICARE

## 2020-01-01 ENCOUNTER — HOSPITAL ENCOUNTER (OUTPATIENT)
Dept: PHYSICAL THERAPY | Age: 75
Setting detail: THERAPIES SERIES
Discharge: HOME | End: 2020-10-29
Payer: MEDICARE

## 2020-01-01 ENCOUNTER — HOSPITAL ENCOUNTER (OUTPATIENT)
Dept: PHYSICAL THERAPY | Age: 75
Setting detail: THERAPIES SERIES
Discharge: HOME | End: 2020-12-10
Payer: MEDICARE

## 2020-01-01 ENCOUNTER — HOSPITAL ENCOUNTER (OUTPATIENT)
Dept: SPEECH THERAPY | Age: 75
Setting detail: THERAPIES SERIES
Discharge: HOME | End: 2020-12-03
Payer: MEDICARE

## 2020-01-01 ENCOUNTER — HOSPITAL ENCOUNTER (OUTPATIENT)
Dept: SPEECH THERAPY | Age: 75
Setting detail: THERAPIES SERIES
Discharge: HOME | End: 2020-10-26
Payer: MEDICARE

## 2020-01-01 ENCOUNTER — HOSPITAL ENCOUNTER (OUTPATIENT)
Dept: PHYSICAL THERAPY | Age: 75
Setting detail: THERAPIES SERIES
Discharge: HOME | End: 2020-11-23
Payer: MEDICARE

## 2020-01-01 ENCOUNTER — HOSPITAL ENCOUNTER (OUTPATIENT)
Dept: OCCUPATIONAL THERAPY | Age: 75
Setting detail: THERAPIES SERIES
Discharge: HOME | End: 2020-11-09
Payer: MEDICARE

## 2020-01-01 ENCOUNTER — HOSPITAL ENCOUNTER (OUTPATIENT)
Dept: PHYSICAL THERAPY | Age: 75
Setting detail: THERAPIES SERIES
Discharge: HOME | End: 2020-11-02
Payer: MEDICARE

## 2020-01-01 ENCOUNTER — HOSPITAL ENCOUNTER (OUTPATIENT)
Dept: SPEECH THERAPY | Age: 75
Setting detail: THERAPIES SERIES
Discharge: HOME | End: 2020-11-19
Payer: MEDICARE

## 2020-01-01 ENCOUNTER — HOSPITAL ENCOUNTER (OUTPATIENT)
Dept: SPEECH THERAPY | Age: 75
Setting detail: THERAPIES SERIES
Discharge: HOME | End: 2020-12-07
Payer: MEDICARE

## 2020-01-01 ENCOUNTER — HOSPITAL ENCOUNTER (OUTPATIENT)
Dept: OCCUPATIONAL THERAPY | Age: 75
Setting detail: THERAPIES SERIES
Discharge: HOME | End: 2020-12-07
Payer: MEDICARE

## 2020-01-01 ENCOUNTER — HOSPITAL ENCOUNTER (OUTPATIENT)
Dept: SPEECH THERAPY | Age: 75
Setting detail: THERAPIES SERIES
Discharge: HOME | End: 2020-10-08
Payer: MEDICARE

## 2020-01-01 ENCOUNTER — HOSPITAL ENCOUNTER (OUTPATIENT)
Dept: PHYSICAL THERAPY | Age: 75
Setting detail: THERAPIES SERIES
Discharge: HOME | End: 2020-11-16
Payer: MEDICARE

## 2020-01-01 ENCOUNTER — HOSPITAL ENCOUNTER (OUTPATIENT)
Dept: SPEECH THERAPY | Age: 75
Setting detail: THERAPIES SERIES
Discharge: HOME | End: 2020-10-12
Payer: MEDICARE

## 2020-01-01 VITALS
TEMPERATURE: 97.8 F | HEART RATE: 62 BPM | SYSTOLIC BLOOD PRESSURE: 136 MMHG | BODY MASS INDEX: 24.49 KG/M2 | WEIGHT: 147 LBS | HEIGHT: 65 IN | OXYGEN SATURATION: 97 % | DIASTOLIC BLOOD PRESSURE: 77 MMHG | RESPIRATION RATE: 18 BRPM

## 2020-01-01 LAB
ALBUMIN SERPL-MCNC: 3.6 G/DL (ref 3.5–5.1)
ALP BLD-CCNC: 122 U/L (ref 38–126)
ALT SERPL-CCNC: < 5 U/L (ref 11–66)
ANION GAP SERPL CALCULATED.3IONS-SCNC: 9 MEQ/L (ref 8–16)
APTT: 29.7 SECONDS (ref 22–38)
AST SERPL-CCNC: 16 U/L (ref 5–40)
BACTERIA: ABNORMAL /HPF
BASOPHILS # BLD: 0.5 %
BASOPHILS ABSOLUTE: 0.1 THOU/MM3 (ref 0–0.1)
BILIRUB SERPL-MCNC: 0.6 MG/DL (ref 0.3–1.2)
BILIRUBIN DIRECT: < 0.2 MG/DL (ref 0–0.3)
BILIRUBIN URINE: NEGATIVE
BLOOD, URINE: NEGATIVE
BUN BLDV-MCNC: 18 MG/DL (ref 7–22)
CALCIUM SERPL-MCNC: 8.9 MG/DL (ref 8.5–10.5)
CASTS 2: ABNORMAL /LPF
CASTS UA: ABNORMAL /LPF
CHARACTER, URINE: CLEAR
CHLORIDE BLD-SCNC: 100 MEQ/L (ref 98–111)
CO2: 27 MEQ/L (ref 23–33)
COLOR: ABNORMAL
CREAT SERPL-MCNC: 0.9 MG/DL (ref 0.4–1.2)
CRYSTALS, UA: ABNORMAL
EKG ATRIAL RATE: 64 BPM
EKG P AXIS: 62 DEGREES
EKG P-R INTERVAL: 190 MS
EKG Q-T INTERVAL: 414 MS
EKG QRS DURATION: 100 MS
EKG QTC CALCULATION (BAZETT): 427 MS
EKG R AXIS: 27 DEGREES
EKG T AXIS: -18 DEGREES
EKG VENTRICULAR RATE: 64 BPM
EOSINOPHIL # BLD: 1.3 %
EOSINOPHILS ABSOLUTE: 0.1 THOU/MM3 (ref 0–0.4)
EPITHELIAL CELLS, UA: ABNORMAL /HPF
ERYTHROCYTE [DISTWIDTH] IN BLOOD BY AUTOMATED COUNT: 12.7 % (ref 11.5–14.5)
ERYTHROCYTE [DISTWIDTH] IN BLOOD BY AUTOMATED COUNT: 45.4 FL (ref 35–45)
GFR SERPL CREATININE-BSD FRML MDRD: 83 ML/MIN/1.73M2
GLUCOSE BLD-MCNC: 99 MG/DL (ref 70–108)
GLUCOSE URINE: NEGATIVE MG/DL
HCT VFR BLD CALC: 40.7 % (ref 42–52)
HEMOGLOBIN: 13.8 GM/DL (ref 14–18)
IMMATURE GRANS (ABS): 0.02 THOU/MM3 (ref 0–0.07)
IMMATURE GRANULOCYTES: 0.2 %
INR BLD: 1.15 (ref 0.85–1.13)
KETONES, URINE: ABNORMAL
LEUKOCYTE ESTERASE, URINE: NEGATIVE
LIPASE: 29.8 U/L (ref 5.6–51.3)
LYMPHOCYTES # BLD: 18 %
LYMPHOCYTES ABSOLUTE: 1.9 THOU/MM3 (ref 1–4.8)
MAGNESIUM: 2 MG/DL (ref 1.6–2.4)
MCH RBC QN AUTO: 33.2 PG (ref 26–33)
MCHC RBC AUTO-ENTMCNC: 33.9 GM/DL (ref 32.2–35.5)
MCV RBC AUTO: 97.8 FL (ref 80–94)
MISCELLANEOUS 2: ABNORMAL
MONOCYTES # BLD: 13.6 %
MONOCYTES ABSOLUTE: 1.4 THOU/MM3 (ref 0.4–1.3)
NITRITE, URINE: NEGATIVE
NUCLEATED RED BLOOD CELLS: 0 /100 WBC
OSMOLALITY CALCULATION: 273.9 MOSMOL/KG (ref 275–300)
PH UA: 6 (ref 5–9)
PLATELET # BLD: 203 THOU/MM3 (ref 130–400)
PMV BLD AUTO: 11.3 FL (ref 9.4–12.4)
POTASSIUM SERPL-SCNC: 3.1 MEQ/L (ref 3.5–5.2)
PROTEIN UA: 30
RBC # BLD: 4.16 MILL/MM3 (ref 4.7–6.1)
RBC URINE: ABNORMAL /HPF
RENAL EPITHELIAL, UA: ABNORMAL
SEG NEUTROPHILS: 66.4 %
SEGMENTED NEUTROPHILS ABSOLUTE COUNT: 6.9 THOU/MM3 (ref 1.8–7.7)
SODIUM BLD-SCNC: 136 MEQ/L (ref 135–145)
SPECIFIC GRAVITY, URINE: 1.02 (ref 1–1.03)
TOTAL PROTEIN: 6.6 G/DL (ref 6.1–8)
TROPONIN T: < 0.01 NG/ML
UROBILINOGEN, URINE: 0.2 EU/DL (ref 0–1)
WBC # BLD: 10.4 THOU/MM3 (ref 4.8–10.8)
WBC UA: ABNORMAL /HPF
YEAST: ABNORMAL

## 2020-01-01 PROCEDURE — 97110 THERAPEUTIC EXERCISES: CPT

## 2020-01-01 PROCEDURE — 97112 NEUROMUSCULAR REEDUCATION: CPT

## 2020-01-01 PROCEDURE — 97130 THER IVNTJ EA ADDL 15 MIN: CPT

## 2020-01-01 PROCEDURE — 97530 THERAPEUTIC ACTIVITIES: CPT

## 2020-01-01 PROCEDURE — 83690 ASSAY OF LIPASE: CPT

## 2020-01-01 PROCEDURE — 85730 THROMBOPLASTIN TIME PARTIAL: CPT

## 2020-01-01 PROCEDURE — 85610 PROTHROMBIN TIME: CPT

## 2020-01-01 PROCEDURE — 97129 THER IVNTJ 1ST 15 MIN: CPT

## 2020-01-01 PROCEDURE — 93010 ELECTROCARDIOGRAM REPORT: CPT | Performed by: INTERNAL MEDICINE

## 2020-01-01 PROCEDURE — 82248 BILIRUBIN DIRECT: CPT

## 2020-01-01 PROCEDURE — 97161 PT EVAL LOW COMPLEX 20 MIN: CPT

## 2020-01-01 PROCEDURE — 83735 ASSAY OF MAGNESIUM: CPT

## 2020-01-01 PROCEDURE — 85025 COMPLETE CBC W/AUTO DIFF WBC: CPT

## 2020-01-01 PROCEDURE — 6370000000 HC RX 637 (ALT 250 FOR IP): Performed by: PHYSICIAN ASSISTANT

## 2020-01-01 PROCEDURE — 73564 X-RAY EXAM KNEE 4 OR MORE: CPT

## 2020-01-01 PROCEDURE — 92523 SPEECH SOUND LANG COMPREHEN: CPT

## 2020-01-01 PROCEDURE — 73130 X-RAY EXAM OF HAND: CPT

## 2020-01-01 PROCEDURE — 36415 COLL VENOUS BLD VENIPUNCTURE: CPT

## 2020-01-01 PROCEDURE — 72125 CT NECK SPINE W/O DYE: CPT

## 2020-01-01 PROCEDURE — 72170 X-RAY EXAM OF PELVIS: CPT

## 2020-01-01 PROCEDURE — 71045 X-RAY EXAM CHEST 1 VIEW: CPT

## 2020-01-01 PROCEDURE — 73080 X-RAY EXAM OF ELBOW: CPT

## 2020-01-01 PROCEDURE — 99285 EMERGENCY DEPT VISIT HI MDM: CPT

## 2020-01-01 PROCEDURE — 84484 ASSAY OF TROPONIN QUANT: CPT

## 2020-01-01 PROCEDURE — 70450 CT HEAD/BRAIN W/O DYE: CPT

## 2020-01-01 PROCEDURE — 73552 X-RAY EXAM OF FEMUR 2/>: CPT

## 2020-01-01 PROCEDURE — 93005 ELECTROCARDIOGRAM TRACING: CPT | Performed by: PHYSICIAN ASSISTANT

## 2020-01-01 PROCEDURE — 80053 COMPREHEN METABOLIC PANEL: CPT

## 2020-01-01 PROCEDURE — 81001 URINALYSIS AUTO W/SCOPE: CPT

## 2020-01-01 PROCEDURE — 92507 TX SP LANG VOICE COMM INDIV: CPT

## 2020-01-01 PROCEDURE — 97165 OT EVAL LOW COMPLEX 30 MIN: CPT

## 2020-01-01 RX ORDER — TOLTERODINE 4 MG/1
1 CAPSULE, EXTENDED RELEASE ORAL DAILY
COMMUNITY
Start: 2018-10-26

## 2020-01-01 RX ORDER — ENTACAPONE 200 MG/1
200 TABLET ORAL 3 TIMES DAILY
COMMUNITY
Start: 2020-01-01 | End: 2021-06-11

## 2020-01-01 RX ORDER — QUETIAPINE FUMARATE 25 MG/1
25 TABLET, FILM COATED ORAL NIGHTLY
COMMUNITY
Start: 2020-01-01 | End: 2021-07-09

## 2020-01-01 RX ORDER — ACETAMINOPHEN 500 MG
1000 TABLET ORAL ONCE
Status: COMPLETED | OUTPATIENT
Start: 2020-01-01 | End: 2020-01-01

## 2020-01-01 RX ORDER — AMLODIPINE BESYLATE 5 MG/1
5 TABLET ORAL DAILY
COMMUNITY
Start: 2018-08-24

## 2020-01-01 RX ORDER — FLUDROCORTISONE ACETATE 0.1 MG/1
0.1 TABLET ORAL DAILY
COMMUNITY
Start: 2020-01-01 | End: 2021-06-11

## 2020-01-01 RX ADMIN — ACETAMINOPHEN 1000 MG: 500 TABLET, FILM COATED ORAL at 21:42

## 2020-01-01 SDOH — HEALTH STABILITY: MENTAL HEALTH: HOW OFTEN DO YOU HAVE A DRINK CONTAINING ALCOHOL?: NEVER

## 2020-01-01 ASSESSMENT — PAIN DESCRIPTION - LOCATION: LOCATION: KNEE

## 2020-01-01 ASSESSMENT — PAIN DESCRIPTION - DESCRIPTORS: DESCRIPTORS: SORE

## 2020-01-01 ASSESSMENT — PAIN SCALES - GENERAL
PAINLEVEL_OUTOF10: 4
PAINLEVEL_OUTOF10: 7
PAINLEVEL_OUTOF10: 7

## 2020-01-01 ASSESSMENT — ENCOUNTER SYMPTOMS
SHORTNESS OF BREATH: 0
DIARRHEA: 0
COLOR CHANGE: 0
VOMITING: 0
SORE THROAT: 0
RHINORRHEA: 0
ABDOMINAL PAIN: 0
PHOTOPHOBIA: 0
COUGH: 0
NAUSEA: 0
BACK PAIN: 0
CONSTIPATION: 0

## 2020-01-01 ASSESSMENT — PAIN DESCRIPTION - FREQUENCY: FREQUENCY: CONTINUOUS

## 2020-01-01 ASSESSMENT — PAIN DESCRIPTION - PAIN TYPE: TYPE: ACUTE PAIN

## 2020-01-01 ASSESSMENT — PAIN DESCRIPTION - ORIENTATION: ORIENTATION: LEFT

## 2020-08-07 NOTE — ED NOTES
Bed: 023A  Expected date: 8/7/20  Expected time: 7:30 PM  Means of arrival: [facility]  Comments:     Pasquale Cerda RN  08/07/20 1943

## 2020-08-07 NOTE — ED TRIAGE NOTES
Patient to room 23 via [id number] [facility] [address] following a fall at approximately 1.5 hours prior to arrival.  Patient has a history of Parkinson's and dementia. Per report, patient was walking and tripped over his feet and fell to the floor. Patient did hit head and there is an abrasion over his left eye. Patient is complaining of right knee pain and is concerned that he may have injured a finger on his right hand. Patient does not take blood thinners. Patient is confused but per family is at his baseline. Patient's wife is at bedside.

## 2020-08-08 NOTE — ED NOTES
ED nurse-to-nurse bedside report    Chief Complaint   Patient presents with    Fall      LOC: alert and orientated to name and place  Vital signs   Vitals:    08/07/20 1943 08/07/20 2142   BP: (!) 169/79 139/79   Pulse: 85 73   Resp: 18 16   Temp: 97.8 °F (36.6 °C)    TempSrc: Oral    SpO2: 100% 99%   Weight: 147 lb (66.7 kg)    Height: 5' 5\" (1.651 m)       Pain:    Pain Interventions: Tylenol  Pain Goal: 4  Oxygen: No    Current needs required Room Air   Telemetry: Yes  LDAs:    Continuous Infusions:   Mobility: Requires assistance * 1  Leiva Fall Risk Score: No flowsheet data found.   Fall Interventions: Hourly rounding  Report given to: Brisa Mathews RN  08/07/20 1380

## 2020-08-08 NOTE — ED NOTES
Patient returns from radiology, medicated as documented. Patient resting on cart watching television with wife. Updated on plan of care, denies further needs.      Cely Vanegas RN  08/07/20 0862

## 2020-08-09 NOTE — ED PROVIDER NOTES
[facility] EMERGENCY DEPT      CHIEF COMPLAINT       Chief Complaint   Patient presents with    Fall       Nurses Notes reviewed and I agree except asnoted in the HPI. HISTORY OFPRESENT ILLNESS    Dillon Rivero is a 70 y.o. male who presents to the emergency department via EMS for evaluation following a fall. The patient has a history of Parkinson's and dementia and is a poor historian. The patient's wife states that approximately 1.5 hours prior to arrival to this department, the patient tripped while walking and fell to the floor. The patient's wife reports that the patient hit the left side of his head on hardwood floor during this fall. She denies loss of consciousness. The patient himself denies having a headache, dizziness, lightheadedness, vision or hearing changes, neck or back pain, weakness, numbness, or tingling. The patient reports pain of his right elbow as well as his left fourth finger and left knee. There are abrasions of the right elbow. The patient denies any chest pain, shortness of breath, or abdominal pain. He denies any fevers, chills, URI symptoms, nausea, vomiting, diarrhea, or constipation. The patient is not anticoagulated. The patient denies any additional injuries or areas of pain. There are no additional complaints at this time. REVIEW OF SYSTEMS      Review of Systems   Constitutional: Negative for chills, fatigue and fever. HENT: Negative for congestion, ear pain, rhinorrhea and sore throat. Eyes: Negative for photophobia and visual disturbance. Respiratory: Negative for cough and shortness of breath. Cardiovascular: Negative for chest pain. Gastrointestinal: Negative for abdominal pain, constipation, diarrhea, nausea and vomiting. Genitourinary: Negative for decreased urine volume, difficulty urinating and dysuria. Musculoskeletal: Positive for arthralgias (right elbow, left 4th finger, left knee).  Negative for back pain, gait problem, joint swelling, myalgias, neck pain and neck stiffness. Skin: Positive for wound (abrasion over left eyebrow). Negative for color change and pallor. Neurological: Negative for dizziness, syncope, weakness, light-headedness, numbness and headaches. Hematological: Does not bruise/bleed easily. PAST MEDICAL HISTORY    has a past medical history of Dementia ([facility] [address] and Parkinson's disease ([facility] [address] SURGICAL HISTORY      has no past surgical history on file. CURRENT MEDICATIONS       Discharge Medication List as of 8/7/2020 10:32 PM      CONTINUE these medications which have NOT CHANGED    Details   amLODIPine (NORVASC) 5 MG tablet Take 5 mg by mouth dailyHistorical Med      QUEtiapine (SEROQUEL) 25 MG tablet Take 25 mg by mouth nightlyHistorical Med      tolterodine (DETROL LA) 4 MG extended release capsule Take 1 capsule by mouth dailyHistorical Med      fludrocortisone (FLORINEF) 0.1 MG tablet Take 0.1 mg by mouth dailyHistorical Med      entacapone (COMTAN) 200 MG tablet Take 200 mg by mouth 3 times dailyHistorical Med      carbidopa-levodopa (SINEMET)  MG per tablet Take 1 tablet by mouth 3 times dailyHistorical Med             ALLERGIES     is allergic to sulfa antibiotics. FAMILY HISTORY     has no family status information on file. [unfilled]    SOCIAL HISTORY      reports that he has quit smoking. He has never used smokeless tobacco. He reports that he does not drink alcohol or use drugs. PHYSICAL EXAM     INITIAL VITALS:  height is 5' 5\" (1.651 m) and weight is 147 lb (66.7 kg). His oral temperature is 97.8 °F (36.6 °C). His blood pressure is 136/77 and his pulse is 62. His respiration is 18 and oxygen saturation is 97%. Physical Exam  Vitals signs and nursing note reviewed. Constitutional:       General: He is not in acute distress. Appearance: Normal appearance. He is well-developed. He is not ill-appearing, toxic-appearing or diaphoretic. HENT:      Head: Normocephalic. Abrasion present. No raccoon eyes, Suh's sign, contusion, masses or laceration. Jaw: There is normal jaw occlusion. No trismus, tenderness, swelling, pain on movement or malocclusion. Comments: There is an abrasion above the left eyebrow without associated tenderness, edema, or bruising. There was no additional edema, bruising, abrasion, or laceration noted of the face or scalp. There was no tenderness or crepitus of the face or scalp. There were no palpable bony or soft tissue abnormalities of the face or scalp. Right Ear: External ear normal.      Left Ear: External ear normal.      Nose: Nose normal. No nasal deformity, signs of injury, laceration or nasal tenderness. Mouth/Throat:      Mouth: Mucous membranes are moist.      Pharynx: Oropharynx is clear. Eyes:      General: No scleral icterus. Right eye: No discharge. Left eye: No discharge. Conjunctiva/sclera: Conjunctivae normal.      Pupils: Pupils are equal, round, and reactive to light. Neck:      Musculoskeletal: Full passive range of motion without pain, normal range of motion and neck supple. Normal range of motion. No edema, erythema, neck rigidity, crepitus, injury, pain with movement, torticollis, spinous process tenderness or muscular tenderness. Cardiovascular:      Rate and Rhythm: Normal rate and regular rhythm. Pulses: Normal pulses. Radial pulses are 2+ on the right side and 2+ on the left side. Heart sounds: Normal heart sounds. No murmur. No friction rub. No gallop. Comments: Capillary refill is less than 3 seconds. Pulmonary:      Effort: Pulmonary effort is normal. No respiratory distress. Breath sounds: Normal breath sounds. No stridor. No wheezing, rhonchi or rales. Comments: There is no chest wall tenderness, edema, bruising, or crepitus. Lung sounds are clear and equal bilaterally. Chest:      Chest wall: No tenderness.    Abdominal:      General: Bowel sounds are normal. There is no distension. Palpations: Abdomen is soft. There is no mass. Tenderness: There is no abdominal tenderness. There is no guarding or rebound. Hernia: No hernia is present. Comments: There is no abdominal tenderness, bruising, edema, distension, or crepitus. Musculoskeletal: Normal range of motion. General: Tenderness present. No swelling or deformity. Right shoulder: Normal. He exhibits normal range of motion, no tenderness, no swelling, no effusion, no crepitus, no deformity, no pain, no spasm and normal strength. Left shoulder: Normal.      Right elbow: He exhibits normal range of motion, no swelling and no deformity. Tenderness found. Left elbow: Normal.      Right wrist: Normal. He exhibits normal range of motion, no tenderness, no swelling, no crepitus, no deformity and no laceration. Left wrist: Normal. He exhibits normal range of motion, no tenderness, no swelling, no effusion, no crepitus and no deformity. Right hip: Normal. He exhibits normal range of motion, normal strength, no tenderness, no swelling, no crepitus and no deformity. Left hip: Normal. He exhibits normal range of motion, normal strength, no tenderness, no swelling, no crepitus and no deformity. Right knee: Normal.      Left knee: He exhibits normal range of motion, no swelling, no effusion, no ecchymosis, no deformity, no laceration, no erythema, normal alignment, no LCL laxity, normal patellar mobility, normal meniscus and no MCL laxity. Tenderness found. Right ankle: Normal.      Left ankle: Normal. He exhibits normal range of motion, no swelling, no ecchymosis and no deformity. No tenderness. Achilles tendon normal.      Cervical back: Normal. He exhibits normal range of motion, no tenderness, no swelling, no deformity, no pain and no spasm.       Thoracic back: Normal. He exhibits normal range of motion, no tenderness, no swelling, no deformity, no pain and no spasm. Lumbar back: Normal. He exhibits normal range of motion, no tenderness, no swelling, no deformity, no pain and no spasm. Right upper arm: Normal. He exhibits no tenderness, no swelling, no deformity and no laceration. Right forearm: Normal. He exhibits no tenderness, no swelling, no deformity and no laceration. Left forearm: Normal.      Right hand: Normal.      Left hand: He exhibits tenderness. He exhibits normal range of motion, normal capillary refill, no deformity and no swelling. Normal sensation noted. Normal strength noted. Right upper leg: Normal.      Left upper leg: Normal. He exhibits no tenderness, no swelling and no deformity. Right lower leg: Normal.      Left lower leg: Normal. He exhibits no tenderness, no swelling and no deformity. Right foot: Normal.      Left foot: Normal.      Comments: There is no midline spinal or paraspinal tenderness of the cervical, thoracic, or lumbar spine. There is good spinal ROM. There is a superficial abrasion of the right elbow without associated tenderness, edema, or bruising. There is mild diffuse tenderness of the left fourth finger and the anterior aspect of the left knee without associated edema, bruising, or wounds. Patient has full ROM and strength of the extremities. There is intact sensation of soft touch in the extremities. The extremities appear to be neurovascularly intact. Skin:     General: Skin is warm and dry. Coloration: Skin is not pale. Findings: No erythema or rash. Neurological:      General: No focal deficit present. Mental Status: He is alert and oriented to person, place, and time. Mental status is at baseline. GCS: GCS eye subscore is 4. GCS verbal subscore is 5. GCS motor subscore is 6. Cranial Nerves: No cranial nerve deficit, dysarthria or facial asymmetry. Sensory: Sensation is intact. No sensory deficit.       Motor: Motor function is intact. No weakness, tremor, abnormal muscle tone or pronator drift. Coordination: Coordination normal.      Comments: There were no noted cranial nerve or focal neurologic deficits. Cranial nerves II through XII are grossly intact. Psychiatric:         Behavior: Behavior normal.         Thought Content: Thought content normal.               DIFFERENTIAL DIAGNOSIS:   Includes but not limited to: Mechanical fall, abrasion, contusion, hematoma, strain, sprain, dislocation, fracture, ICH, SAH, closed head injury, TBI, concussion    DIAGNOSTIC RESULTS     EKG: All EKG's are interpreted by the Emergency Department Physician who either signs or Co-signsthis chart in the absence of a cardiologist.  EKG Fall (Final result)    Component (Lab Inquiry)   Collection Time  Result Time  Ventricular Rate  Atrial Rate  P-R Interval  QRS Duration  Q-T Interval    08/07/20 19:51:16  08/07/20 19:51:16  64  64  190  100  414         Collection Time  Result Time  QTc Calculation (Bazett)  P Axis  R Axis  T Axis    08/07/20 19:51:16  08/07/20 19:51:16  427  62  27  -18           Final result                 Narrative:     Poor data quality, interpretation may be adversely affected   Normal sinus rhythm   Normal ECG   No previous ECGs available   Confirmed by [address] [place] [id number] ([id number]) on 8/8/2020 9:54:29 PM               RADIOLOGY: non-plain film images(s) such as CT, Ultrasound and MRI are read by the radiologist.  Plainradiographic images are visualized and preliminarily interpreted by the emergency physician unless otherwise stated below. XR KNEE RIGHT (MIN 4 VIEWS)   Final Result    No fracture or dislocation. Mild patellofemoral degenerative changes. **This report has been created using voice recognition software. It may contain minor errors which are inherent in voice recognition technology. **      Final report electronically signed by Dr. Tammy Garcia on 8/7/2020 9:43 PM      XR HAND LEFT (MIN 3 VIEWS)   Final Result    No acute bone or joint abnormality. Hand and wrist DJD. **This report has been created using voice recognition software. It may contain minor errors which are inherent in voice recognition technology. **      Final report electronically signed by Dr. Eleno Lawler on 8/7/2020 9:44 PM      XR FEMUR LEFT (MIN 2 VIEWS)   Final Result      No fracture or dislocation. Moderate to severe medial compartment knee joint DJD. **This report has been created using voice recognition software. It may contain minor errors which are inherent in voice recognition technology. **       Final report electronically signed by Dr. Eleno Lawler on 8/7/2020 9:49 PM      XR ELBOW RIGHT (MIN 3 VIEWS)   Final Result    No acute bone or joint abnormality. Elbow joint DJD. **This report has been created using voice recognition software. It may contain minor errors which are inherent in voice recognition technology. **      Final report electronically signed by Dr. Eleno Lawler on 8/7/2020 9:45 PM      XR CHEST 1 VIEW   Final Result      No acute cardiopulmonary disease. Mild cardiomegaly. **This report has been created using voice recognition software. It may contain minor errors which are inherent in voice recognition technology. **      Final report electronically signed by Dr. Eleno Lawler on 8/7/2020 9:46 PM      XR PELVIS (1-2 VIEWS)   Final Result    No acute bone or joint abnormality. **This report has been created using voice recognition software. It may contain minor errors which are inherent in voice recognition technology. **      Final report electronically signed by Dr. Eleno Lawler on 8/7/2020 9:48 PM      CT HEAD WO CONTRAST   Final Result      No acute ischemic infarct, hemorrhage, or mass effect. **This report has been created using voice recognition software. It may contain minor errors which are inherent in voice recognition technology. **      Final report Resp: 18 16 18   Temp: 97.8 °F (36.6 °C)     TempSrc: Oral     SpO2: 100% 99% 97%   Weight: 147 lb (66.7 kg)     Height: 5' 5\" (1.651 m)       The patient was seen and evaluated within the ED today with a closed head injury, right elbow pain, left fourth finger pain, and left knee pain following a mechanical fall. Within the department, I observed the patient's vital signs to be within acceptable range, and he was afebrile. On exam, I appreciated an abrasion above the left eyebrow without associated tenderness, edema, or bruising. There was no additional edema, bruising, abrasion, or laceration noted of the face or scalp. There was no tenderness or crepitus of the face or scalp. There were no palpable bony or soft tissue abnormalities of the face or scalp. There were no noted cranial nerve or focal neurologic deficits. Cranial nerves II through XII are grossly intact. There is no chest wall tenderness, edema, bruising, or crepitus. Lung sounds are clear and equal bilaterally. There is no abdominal tenderness, bruising, edema, distension, or crepitus. There is no midline spinal or paraspinal tenderness of the cervical, thoracic, or lumbar spine. There is good spinal ROM. There is a superficial abrasion of the right elbow without associated tenderness, edema, or bruising. There is mild diffuse tenderness of the left fourth finger and the anterior aspect of the left knee without associated edema, bruising, or wounds. Patient has full ROM and strength of the extremities. There is intact sensation of soft touch in the extremities. The extremities appear to be neurovascularly intact. Radiologic studies within the department revealed no acute hemorrhage, infarct, mass, or other acute cranial or intracranial pathology. CT of the C-spine revealed no acute fracture, dislocation, or soft tissue abnormality.  CXR revealed mild cardiomegaly but no acute intrapulmonary process, infiltrations, effusions, or consolidations. No acute traumatic pathology. X-ray of the right elbow revealed no acute fracture, dislocation, or soft tissue abnormality. X-ray of the left hand revealed no acute fracture, dislocation, or soft tissue abnormality. X-ry of the pelvis revealed no acute fracture, dislocation, or soft tissue abnormality. X-ray of the left femur revealed no acute fracture or dislocation but did show moderate to severe medial compartmental left knee joint degenerative disc disease. X-ray of the left knee revealed no acute fracture, dislocation, or soft tissue abnormality. Laboratory work was reassuring. Red blood cell indices are stable. Potassium noted to be 3.1, but electrolytes are otherwise within normal range. Troponin is negative. Hepatic, biliary, renal, and pancreatic function tests are within normal range. UA is not suspicious for UTI. Within the department, the patient was treated with Tylenol. I observed the patient's condition to improve during the duration of the stay. I explained my proposed course of treatment to the patient and his wfie, who were amenable to my treatment and discharge decisions. The patient was discharged home in stable condition, and the patient will return to the ED if the symptoms become more severe in nature or otherwise change. CRITICAL CARE:   None    CONSULTS:  Discussed the case with my attending physician in the Emergency Department, Dr. Soheila Etienne, who agreed with my workup, treatment, and disposition decisions. PROCEDURES:  None    FINAL IMPRESSION      1. Closed head injury, initial encounter    2. Contusion of right elbow, initial encounter    3. Contusion of left ring finger without damage to nail, initial encounter    4. Contusion of left knee, initial encounter    5. Fall from slip, trip, or stumble, initial encounter          DISPOSITION/PLAN     1. Closed head injury, initial encounter    2. Contusion of right elbow, initial encounter    3.  Contusion of

## 2020-09-17 NOTE — FLOWSHEET NOTE
** PLEASE SIGN, DATE AND TIME CERTIFICATION BELOW AND RETURN TO [facility] OUTPATIENT REHABILITATION (FAX #: [phone]). ATTEST/CO-SIGN IF ACCESSING VIA [employer]. THANK YOU.**    I certify that I have examined the patient below and determined that Physical Medicine and Rehabilitation service is necessary and that I approve the established plan of care for up to 90 days or as specifically noted. Attestation, signature or co-signature of physician indicates approval of certification requirements.    ________________________ ____________ __________  Physician Signature   Date   Time  [address] [facility]  PHYSICAL THERAPY  [x] EVALUATION  [] DAILY NOTE (LAND) [] DAILY NOTE (AQUATIC ) [] PROGRESS NOTE [] DISCHARGE NOTE    [] [facility]   [] [address]    [] [facility]   [x] Altamease Chew    Date: 2020  Patient Name:  Shani Toussaint  : 1945  MRN: [id number]    Referring Practitioner Analisa Srinivasan MD   Diagnosis Dementia with Lewy bodies [G31.83]  Dementia in other diseases classified elsewhere with behavioral disturbance [F02.81]    Treatment Diagnosis Difficulty walking    Date of Evaluation 20    Additional Pertinent History HTN; Lewy Body Dementia; Parkinson's       Functional Outcome Measure Used Ariza Balance Scale   Functional Outcome Score 26/56 (20)       Insurance: Primary: Payor: Shirlene Dillard /  /  / ,   Secondary: [employer]   Authorization Information: Pre-certification not required   Visit # 1, 1/10 for progress note   Visits Allowed: Based on medical necessity    Recertification Date: 2020   Physician Follow-Up:    Physician Orders:    History of Present Illness:      SUBJECTIVE: Feli Nicole returns to PT to address sudden increase in falls; 5 falls over the last 2 weeks. He mentioned ringing in his ears to his wife for the first time last week and he states that his legs feel wobbly.  He does have spasms affecting his right UE and LE; during the eval he had a restring tremor on left UE. Social/Functional History and Current Status:  Medications and Allergies have been reviewed and are listed on Medical History Questionnaire. Shani Toussaint lives with spouse in a single story home with a level surface to enter. Task Previous Current   ADLs  Assistance Required Assistance Required   IADL's Assistance Required Assistance Required   Ambulation Modified Independent Modified Independent   Transfers Independent Modified Independent   Recreation Not Applicable Not Applicable   Community Integration Independent Independent   Driving Does not drive Does not drive   Work Retired  Retired     OBJECTIVE:    Pain: 0/10 here for balance issues    Palpation    Observation Sits/stands with forward head and increased kyphotic posture    Posture See above       Range of Motion    Strength Difficult to assess due to difficulty with following instruction    Coordination    Sensation    Bed Mobility Modified independent to min assist needed with all bed transfers    Transfers    Ambulation Initially walks with shuffled gait pattern; when given a long hallway he is able to extend his stride and demonstrate a more \"normal\" gait pattern.  He does not use AD   Stairs Ascends/descends with reciprocal gait and UE support on bilateral railings    Balance Ariza/56   Special Tests          TREATMENT   Precautions:    Pain:     X in shaded column indicates activity completed today   Modalities Parameters/  Location  Notes                     Manual Therapy Time/Technique  Notes                     Exercise/Intervention   Notes                                                                                  Specific Interventions Next Treatment: Transfer training/bed mobility; static and dynamic balance and gait activities     Activity/Treatment Tolerance:  [x]  Patient tolerated treatment well  []  Patient limited by fatigue  []

## 2020-09-17 NOTE — FLOWSHEET NOTE
head one of the times. No bleed was noted on follow up tests. SUBJECTIVE: Patient reports bruises after his falls. Social/Functional History:  Medications and Allergies have been reviewed and are listed on the 4 Jenny Shook lives with spouse in a multiple floor home with ability to complete ADL's on main floor with stairs and a handrail to enter. .    Task Prior Level of Function  (current level of function addressed below)   ADLs  Assistance Required   Ambulation Modified Independent; however reports several falls in last 2 weeks   Transfers Modified Independent   Hobbies Watching [employer], used to ride [employer]    Driving Does not drive   Work Retired     SUBJECTIVE: Patient indicates he would like to improve his strength, balance and ROM. OBJECTIVE:    VISION:Corrected  Wears glasses    HEARING:  WFL    COGNITION: Slow Processing, Impaired Memory and Impaired Attention, Following directions. RANGE OF MOTION:  Bilateral Upper Extremity:  Impaired - Right Active shoulder flexion = 98, scaption = 80, extension = 40, IR to middle back and ER = hands to side of head above ears. Left shoulder flexion = 105, abduction = 80 and extension = 32 degrees. IR to middle backe and ER = arms to side and hands to above ears. STRENGTH:  Bilateral Upper Extremity:  Impaired - Bilateral elbow flexion/extension = 4-/5, Bilateral shoulder flexion/extension = 3+/5 in available range. Some difficulty noted with following directions. SENSATION:  WFL    COORDINATION:  Impaired - 9 hole peg test on right = 84 seconds, left = 74 seconds    MOVEMENT DESCRIPTORS:    Fine Motor Impairments, Gross Motor Impairments and Intention Tremors    TONE:   Tight musculature noted    ADL:   Feeding: Modified Independent. Grooming: Minimal Assistance and Moderate Assistance. wife assists for shaving  Bathing: Supervision.   wife reports patient completes bathing by himself and she is available to assist as needed  Upper Extremity Dressing: Moderate Assistance. per patient and wife report  Lower Extremity Dressing: Moderate Assistance. per patient and wife report  Toileting: Supervision. per report  Toilet Transfer: Modified Independent. per report  Shower Transfer: Supervision. wife stands by per report. BALANCE:  Standing Balance: Stand By Assistance. during ambulation. One slight loss of balance noted with static standing in which patient able to regain balance independently    BED MOBILITY:  Not Tested    TRANSFERS:  Sit to Stand:  Modified Independent. Stand to Sit: Modified Independent. FUNCTIONAL MOBILITY:  Assistive Device: Rolling Walker and Straight Cane - not using at home  Assist Level:  Stand By Assistance. Distance: 30 feet in clinic. Patient has shortened steps. TREATMENT   Precautions: Fall risk    Pain: No pain at rest.  Patient reports his shoulders hurt him at times. X in shaded column indicates Activity Completed Today   Modalities Parameters/  Location  Notes/Comments                     Manual Therapy Time/  Technique  Notes/Comments                     Exercises   Sets/  Sec Reps  Notes/Comments                                                    Activities Time    Notes/Comments   Patient stood at edge of table to complete reaching task clipping resistive clothespins on vertical pole.   5 minutes     Patient stood at edge of table completing  reciprocal marching with cues to bring knees up higher 5 minutes             Specific Interventions Next Treatment: fine motor coordination, standing endurance/functional mobility, safety, ADLs, UE ROM and UE strengthening    Activity/Treatment Tolerance:  [x]  Patient tolerated treatment well  []  Patient limited by fatigue  []  Patient limited by pain   []  Patient limited by other medical complications  []  Other:     Assessment: Patient has increased falls at home as well as increased difficulty with fine motor coordination, UE strength and ROM, memory and following directions. Areas for Improvement: impaired balance, impaired cognition, impaired coordination, impaired ROM, impaired safety awareness and impaired strength  Prognosis: fair    Patient Education:   [x]  Education Completed: Plan of Care, Goals,    [address] Access Code for HEP:   []  No new Education completed  []  Reviewed Prior HEP      []  Patient verbalized and/or demonstrated understanding of education provided. []  Patient unable to verbalize and/or demonstrate understanding of education provided. Will continue education. [x]  Barriers to learning: decreased memory and following directions    GOALS:  Patient Goal: Improve strength and balance. \"No more falls. \"    Short Term Goals:  Time Frame: 5 weeks  1. Patient will complete UE HEP with supervision from his wife for increased ease with dressing. 2.  Patient will increase right and left hand fine motor coordination for ease with tying shoes as demonstrated by completing 9 hole peg in 79 seconds on the right and 69 seconds on the left. 3.  Patient will increase right hand  strength to 30# and left hand  to 29# for ease with opening jars. 4.  Patient will increase right shoulder AROM flexion to 105, scaption to 90 and left shoulder flexion to 115, scaption to 90 degrees for increased ease with donning his shirt. Long Term Goals:  Time Frame: 10 weeks  1. Patient will require no more than 1 verbal cues for safe mobility in clinic during treatment session. 2.  Patient will require no more than 1 repeated instructions for activity during treatment session. PLAN:  Treatment Recommendations: Strengthening, Range of Motion, Home Exercise Program, Safety Education and Training and Coordination    [x]  Plan of care initiated. Plan to see patient 2 times per week for 8 weeks to address the treatment planned outlined above.   []  Continue with current plan of care  []

## 2020-09-21 NOTE — FLOWSHEET NOTE
** PLEASE SIGN, DATE AND TIME CERTIFICATION BELOW AND RETURN TO [facility] OUTPATIENT REHABILITATION (FAX #: [phone]). ATTEST/CO-SIGN IF ACCESSING VIA [employer] ([employer]). THANK YOU.**    I certify that I have examined the patient below and determined that Physical Medicine and Rehabilitation service is necessary and that I approve the established plan of care for up to 90 days or as specifically noted. Attestation, signature or co-signature of physician indicates approval of certification requirements.    ________________________ ____________ __________  Physician Signature   Date   Time    [facility]  Speech - Language - Cognitive Evaluation    [] [facility]  [] [facility]  [] [facility]  [x] [facility] 2    Date: 2020  Patient Name: Elvia Aschoff       CSN: [id number]    : 1945  (70 y.o.)  Gender: male     Referring Physician: Dr. Chance Seaman   Diagnosis: Lauren body dementia with behavioral disturbance   Secondary Diagnosis: cognitive deficits, dysphonia, dysarthria, expressive and receptive language impairments  Insurance/Certification Information: Medicare  Visit number / total approved visits: 1 / Unlimited visits based on medical necessity. [employer] is secondary insurance. Visit count since last progress note:  1  Certification Date: 91  Diet:  Regular solids. Thin liquids. History of Present Illness/Injury:  The patient arrives for outpatient speech therapy evaluation, referred by Dr. Chance Seaman due to decreased overall functioning, as reported by the patient's wife. The patient's wife shares the majority of past and current history due to patient's dementia and overall communication impairments. She explains that his overall functioning has declined since COVID-19 has limited their out of the home activities and overall motivation.  She states that he is essentially 'going backwards'. They previously would check the calendar every morning, and they do not do this anymore. The patient has lewy body dementia and Parkinson disease. She endorses that he experiences hallucinations. He previously attended speech therapy at this facility, with his discharge in September 2019. Speech therapy evaluation to focus on cognitive-communication abilities at this time to promote improved functioning for continued safe living in the current home environment. Patient has a past medical history of Dementia ([address] and Parkinson's disease ([facility] [address] SOCIAL HISTORY: The patient lives at home with his wife. She is his primary caregiver. He does not complete any IADLs. The wife shares that the patient brushes his own teeth independently and showers with her supervision. The patient's wife dresses him and shaves him due to physical limitations. He is a retired . He previously attended an exercise group however this was placed on hold due to COVID-19 pandemic. Precautions: fall risk    Pain: 0/10    Subjective:  Patient is pleasant and cooperative for the duration of this assessment. The wife provides the majority of the patient's history at this time. He does contribute a small amount throughout the evaluation. He states \"it's depressing\" at the conclusion of [address] administration after realizing he performed poorly. Minimal conversation initiation across the assessment and verbalizations lack content/meaning. ORAL MOTOR:  Labial / Facial: WFL for ROM and coordination  Lingual: Reduced ROM upon elevation but appears grossly functional. Lingual tremor noted upon protrusion. Soft Palate: WFL  Sensation: WFL  Dentition: WFL with some missing natural dentition     SPEECH / VOICE:  Upon informal assessment, the patient presents with mildly reduced vocal intensity. Reduced articulatory precision is noted, however the patient's cloth mask is a barrier at this time.  The patient's wife reports she is able to hear him the majority of the time. When asked, the patient's wife prefers to focus on cognitive goals at this time. Consider possible future voice evaluation and treatment if patient and/or caregiver prefer. LANGUAGE:  Receptive:  1-step verbal directions: ~80% for oral motor assessment commands  Reduced ability to accurately respond to verbal questions presented to patient. Expressive:  Confrontation naming on MOCA: 3/3  Sentence repetition on MOCA: 0/2  Conversation: poor ability to initiate and participate in conversation. Limited verbalizations noted across entire assessment outside of required verbal responses during [facility]. Reduced ability to participate in conversational turns with noted reduced content during attempted conversation. COGNITION:  Magan Cognitive Assessment (MOCA) version 7.2 completed. Pt scored 7/30. Normal is greater than or equal to 26/30. Executive Function/Visuospatial: 1/5  Naming: 3/3  Immediate Memory: 2/5 and 3/5  Attention: 0/5  Language: 0/3 (Named 3 items in 1 minute. Normal is > 11 items.)  Abstraction: 1/2  Delayed Recall: 0/5. Forced choice cues improve performance to  /5  Orientation: 2/6    *Previous MOCA score on Version 7.1 in July 2019 was 8/30. SWALLOWING:  When asked, the patient agrees that he feels sometimes things 'get stuck' in his throat. The patient's wife reports that the patient is able to eat steak (that she cuts into small bites) with no problems noted. The wife is not concerned about his swallowing at this time. Encouraged her to continue to monitor his abilities and report back if any changes are noted. Patient also denies odynophagia. IMPRESSIONS: The patient presents with severe cognitive-communication impairments in nearly all cognitive domains. His strengths include great family support, the ability to change productions after being provided with cues, and intact confrontation naming skills.  He is disoriented to temporal concepts and is unable to accurately recall basic personal information. He also demonstrates reduced expressive and receptive language functioning characterized by reduced ability to repeat sentences, reduced direction following for basic 1-2 step commands, and reduced ability to participate in basic conversational turns with intact topic maintenance and content. Dysarthria is noted informally, characterized by decreased articulatory precision. Dysphonia is also present, characterized by reduced vocal intensity. He does demonstrate the ability to increase loudness when requested. Reduced vocal intensity impairs his overall speech intelligibility, leading to frequent requests for repetition of utterances which impacts communication effectiveness. The patient does demonstrate some amount of insight to his deficits, with increased awareness near the conclusion of the evaluation. Skilled speech therapy services are recommended to improve communication effectiveness across multiple environments and improve basic safety awareness and ADL completion within the home setting. REHAB POTENTIAL: [] Excellent [] Good [x] Fair  [] Poor    EDUCATION:   Learner: [x]Patient [x] Significant other (wife) [] Son/Daughter     [] Parent [] Other:   Education: [x] Reviewed results and recommendations of this evaluation     [] Reviewed diet and strategies     [] Reviewed signs, symptoms and risk of aspiration     [] Demonstrated how to thick liquid appropriately. [x] Reviewed goals and Plan of Care     [] OTHER:   Method: [x] Discussion [x] Demonstration [] Hand-out     [] OTHER:   Evaluation of Education:     [x] Verbalizes understanding (wife) [x] Demonstrates with assistance (wife)     [] Demonstrates without assistance [x]Needs further instruction     [] No evidence of learning  [] Family not present    PLAN / TREATMENT RECOMMENDATIONS:  [] No further speech therapy services indicated.   [] Speech Therapy

## 2020-10-08 NOTE — PROGRESS NOTES
[address] THERAPY    [x] DAILY NOTE [] PROGRESS NOTE [] DISCHARGE NOTE    [][facility] [address]   [][place] [address]   [][facility]            Date: 10/8/2020  Patient Name: Jorge Necessary      CSN: [id number]   : 1945  (70 y.o.)  Gender: male   Referring Physician:  Dr. Obi Argueta   Diagnosis: Lewy body dementia with behavioral disturbance   Secondary Diagnosis: cognitive deficits, dysphonia, dysarthria, expressive and receptive language impairments  Insurance/Certification Information: Medicare. [employer] is secondary insurance. Visit number / total approved visits: 2 / Unlimited visits based on medical necessity. Visit count since last progress note:  2  Certification Date:   Last scheduled appointment: 2020  Date of Last MBS:  N/A  Diet: Regular solids. Thin liquids. Subjective: Patient pleasant and cooperative. Wife present for duration of session. Patient stated, Lisa Lopez mind is totally off\" in response to task stimuli. Occasional inappropriate responses to task stimuli throughout session (e.g., giving a number/digit response when a word is expected). SHORT TERM GOAL #1:  Goal 1: The patient will demonstrate 70% success on orientation and basic personal information recall tasks given mod cues to use external aids or compensatory strategies to reduce confusion and promote insight to current situation. INTERVENTIONS: SLP tasked patient with orientation questions to promote knowledge and understanding of current situation. Facility - mod logical verbal cues   Town - independent   Situation - visual cues of SLP name tag facilitated success   Month - max logical verbal and visual cues (written multiple choice)  RL - independent   Year - incorrect despite max verbal, visual cues (written forced choice)    Discussed where to locate orientation information in the home.  The patient independently named \"TV\". Max verbal cues given for newspaper, requiring forced choice to meet success. Educated on using watches, radio, and calendars in the home to locate daily information. Encouraged daily crossing off of past days to simplify calendar use and promote patient's understanding and ability to utilize. SHORT TERM GOAL #2:  Goal 2: The patient will complete basic level problem solving, reasoning, and sequencing tasks with 70% accuracy given mod cues to promote improved ADL completion. INTERVENTIONS: Did not address this visit. SHORT TERM GOAL #3:  Goal 3: The patient will follow 1 and 2 step written or verbal directions with 80% success given mod cues to promote ability to complete ADLs and utilize visual aids in the home environment. INTERVENTIONS: SLP presented verbal directions to the patient to follow to promote language understanding for completion of ADLs. 1-step verbal directions: 10/10 independent  2-step verbal directions: 1/3 independent, 1/3 following model, 1/3 incorrect  Educated patient and wife on rationale for goal and how this applies in the home environment. Both demonstrated verbal understanding. SHORT TERM GOAL #4:  Goal 4: The patient will demonstrate 80% success on functional responsive/divergent/convergent naming tasks given mod cues to promote improved expression of wants/needs/opinions. INTERVENTIONS: Responsive naming is utilized within other tasks this session as verbal cueing methods. Patient unable to meet success despite thorough verbal descriptions of items presented by SLP (e.g., Halloween, newspaper). Divergent naming task requiring patient to name 4 category items to promote word finding and thought organization. Vehicles: [facility], [place], Mac truck named independently  Tools: hammer, chisel, rake, hose named independently given min extended time  [address]: [place], [place], [place], [place] named independently  [place] States:  [facility], [address] [place] [address], [place] named independently  Farm animals: cow, steer, sheep, chickens named independently    Occasional cues required to maintain attention to task within task (e.g., \"name 2 more tools\"). SHORT TERM GOAL #5:  Goal 5: The patient will complete functional visuo-spatial tasks with less than 3 errors per task, given mod cues, to promote improved safety inside and outside the home with ADLs. INTERVENTIONS: Did not address this session. LONG-TERM GOAL:  Goal 1: The patient will improve cognitive-communication functioning to the level of moderate impairment or above to promote improved safety, awareness/insight, and overall participation within the home and community environments. ONGOING.       Assessment: [x]Progressing towards goals []Not Progressing towards goals  Plan for Next Session: orientation and personal info recall tasks, problem solving/reasoning/sequencing tasks, following 1 and 2 step directions, responsive/divergent/convergent naming tasks, visuospatial tasks   Patient Tolerance of Treatment:  []Tolerated well [x]Tolerated fair []Required rest breaks  []Fatigued    Education:  [x]Education was provided []Education not provided due to:  Learner:  [x]Patient [x]Significant Other (wife) []Other  Education provided regarding:  [x]Goals and POC []Diet and swallowing precautions    []Home Exercise Program []Progress and/or discharge information  Method of Education: [x]Discussion [x]Demonstration []Handout []Other:  Evaluation of Education:  [x]Verbalized understanding []Demonstrates without assistance  [x]Demonstrates with assistance [x]Needs further instruction    []No evidence of learning  []No family present      Plan: [x]Continue with current plan of care []Modify current plan of care   []Progress note - frequency and duration:   []Discharge notes - reason for discharge:     [x]Patient continues to require treatment by a licensed therapist to address functional deficits as outlined in the established plan of care. Certification required: [] Yes - See Physician Certification below.       [x] No       Time in: 1002  Time out: 1046  Untimed treatment: 0 minutes  Timed treatment: 44 minutes  Total time: 44 minutes      Candy Enamorado M.S., [address] [place] [address]

## 2020-10-09 NOTE — PROGRESS NOTES
[facility]  PHYSICAL THERAPY  [] EVALUATION  [x] DAILY NOTE (LAND) [] DAILY NOTE (AQUATIC ) [] PROGRESS NOTE [] DISCHARGE NOTE    [] [facility]   [] Ekaterina     [] [address] [facility]   [x] April Martinez    Date: 10/9/2020  Patient Name:  Cookie Spatz  : 1945  MRN: [id number]    Referring Practitioner Dinah Aranda MD   Diagnosis No admission diagnoses are documented for this encounter. Treatment Diagnosis Difficulty walking    Date of Evaluation 20    Additional Pertinent History HTN; Lewy Body Dementia; Parkinson's       Functional Outcome Measure Used Ariza Balance Scale   Functional Outcome Score 26/56 (20)       Insurance: Primary: Payor: Lorri Ayala /  /  / ,   Secondary: [employer]   Authorization Information: Pre-certification not required   Visit # 2, 2/10 for progress note   Visits Allowed: Based on medical necessity    Recertification Date: 2020   Physician Follow-Up:    Physician Orders:    History of Present Illness:      SUBJECTIVE: Afshin Oneal states he is ready to exercise and has no pain.         TREATMENT   Precautions:    Pain:0/10     X in shaded column indicates activity completed today   Modalities Parameters/  Location  Notes                     Manual Therapy Time/Technique  Notes                     Exercise/Intervention   Notes   NuStep   x6 min x Seat 9, arms 9, load 5          In //: heel/toe raises, marches, mini squats x10 ea  x With bilat UE support   3- way hip, hamstring curls x10 ea  x           rockerboard fwd, lat   x Max VCs and TCs for all activities   [facility]  in //   x           Tandem stance L/R   x    Walking fwd, retro with hands held by therapist   x                  Seated: marching, LAQs, hip add with ball, hip abd with green T band   x Pt instructed to use command words like \"squeeze\" and :open\" along with task                   Specific Interventions Next Treatment: Transfer training/bed mobility; static and dynamic balance and gait activities     Activity/Treatment Tolerance:  [x]  Patient tolerated treatment well  []  Patient limited by fatigue  []  Patient limited by pain   []  Patient limited by medical complications  []  Other:     Assessment:Initiated there ea and balance training. Tolerated well. Body Structures/Functions/Activity Limitations: impaired activity tolerance, impaired balance, impaired cognition, impaired endurance, impaired safety awareness, impaired strength and abnormal gait  Prognosis: fair    GOALS:  Patient Goal: Minimize fall risk    Short Term Goals:  Time Frame: 3 weeks   1. Simone Peterson will demonstrate a good ankle strategy to correct for sway while standing without needing verbal cuing. 2. Abran's Ariza balance score will improve to 35/56 indicating a lessening fall risk. 3. Simone Peterson will be able to transfer from supine to side lying to facilitate greater ease with supine to sit transfers in/out of bed. Long Term Goals:  Time Frame: 6 weeks   1. Discharge with independent HEP  2. Abran's Ariza score will improve to 45/56 indicating mild fall risk. 3. Simone Peterson will perform all bed mobility and transfers independently as his trunk strength and mobility improves. Patient Education:   []  HEP/Education Completed: Plan of Care, Goals, review of [address] Access Code:  []  No new Education completed  []  Reviewed Prior HEP      [x]  Patient verbalized and/or demonstrated understanding of education provided. []  Patient unable to verbalize and/or demonstrate understanding of education provided. Will continue education.   [x]  Barriers to learning: cognitive changes making it difficulty to following verbal instruction    PLAN:  Treatment Recommendations: Strengthening, Range of Motion, Balance Training, Functional Mobility Training, Transfer Training, Endurance Training, Gait Training, Neuromuscular Re-education, Manual Therapy - Soft Tissue

## 2020-10-12 NOTE — PROGRESS NOTES
[facility]  PHYSICAL THERAPY  [] EVALUATION  [x] DAILY NOTE (LAND) [] DAILY NOTE (AQUATIC ) [] PROGRESS NOTE [] DISCHARGE NOTE    [] [facility]   [] Ekaterina     [] [address] [facility]   [x] Tigist Mishra    Date: 10/12/2020  Patient Name:  Omkar Domingo  : 1945  MRN: [id number]    Referring Practitioner Doug Kawasaki, MD   Diagnosis No admission diagnoses are documented for this encounter. Treatment Diagnosis Difficulty walking    Date of Evaluation 20    Additional Pertinent History HTN; Lewy Body Dementia; Parkinson's       Functional Outcome Measure Used Ariza Balance Scale   Functional Outcome Score 26/56 (20)       Insurance: Primary: Payor: Iris Corey /  /  / ,   Secondary: HUMANA   Authorization Information: Pre-certification not required   Visit # 3, 3/10 for progress note   Visits Allowed: Based on medical necessity    Recertification Date: 2020   Physician Follow-Up:    Physician Orders:    History of Present Illness:      SUBJECTIVE: Had a rough night last night. ..didn't sleep very well.         TREATMENT   Precautions:    Pain:0/10     X in shaded column indicates activity completed today   Modalities Parameters/  Location  Notes                     Manual Therapy Time/Technique  Notes                     Exercise/Intervention   Notes   NuStep   x6 min x Seat 9, arms 9, load 5          In //: heel/toe raises, marches, mini squats x10 ea  x With bilat UE support on airex    3- way hip, hamstring curls x10 ea             rockerboard fwd, lat   x Max VCs and TCs for all activities   [facility]  in //              Tandem stance L/R   x    Walking fwd, retro with hands held by therapist       // bars: lateral walking, tandem walk 4x  x           Seated: marching, LAQs, hip add with ball, hip abd with green T band   x Pt instructed to use command words like \"squeeze\" and :open\" along with task Alternating step touch on 6 inch step 6x  x             Specific Interventions Next Treatment: Transfer training/bed mobility; static and dynamic balance and gait activities     Activity/Treatment Tolerance:  [x]  Patient tolerated treatment well  []  Patient limited by fatigue  []  Patient limited by pain   []  Patient limited by medical complications  []  Other:     Assessment: Was quick to fatigue today, however he only had 1 seated rest break today. Progressed balance program by incorporating airex balance pad. Body Structures/Functions/Activity Limitations: impaired activity tolerance, impaired balance, impaired cognition, impaired endurance, impaired safety awareness, impaired strength and abnormal gait  Prognosis: fair    GOALS:  Patient Goal: Minimize fall risk    Short Term Goals:  Time Frame: 3 weeks   1. Alpesh Rao will demonstrate a good ankle strategy to correct for sway while standing without needing verbal cuing. 2. Abran's Ariza balance score will improve to 35/56 indicating a lessening fall risk. 3. Alpesh Rao will be able to transfer from supine to side lying to facilitate greater ease with supine to sit transfers in/out of bed. Long Term Goals:  Time Frame: 6 weeks   1. Discharge with independent HEP  2. Abran's Ariza score will improve to 45/56 indicating mild fall risk. 3. Alpesh Rao will perform all bed mobility and transfers independently as his trunk strength and mobility improves. Patient Education:   []  HEP/Education Completed: Plan of Care, Goals, review of Marques Meigs [address] Access Code:  []  No new Education completed  []  Reviewed Prior HEP      [x]  Patient verbalized and/or demonstrated understanding of education provided. []  Patient unable to verbalize and/or demonstrate understanding of education provided. Will continue education.   [x]  Barriers to learning: cognitive changes making it difficulty to following verbal instruction    PLAN:  Treatment Recommendations: Strengthening, Range of Motion, Balance Training, Functional Mobility Training, Transfer Training, Endurance Training, Gait Training, Neuromuscular Re-education, Manual Therapy - Soft Tissue Mobilization, Manual Therapy - Joint Manipulation, Home Exercise Program, Patient Education and Safety Education and Training    []  Plan of care initiated. Plan to see patient 2 times per week for 6 weeks to address the treatment planned outlined above.   [x]  Continue with current plan of care  []  Modify plan of care as follows:    []  Hold pending physician visit  []  Discharge    Time In 1030   Time Out 1100   Timed Code Minutes: 30 min   Total Treatment Time: 30 min       Electronically Signed by: Wayne Paul

## 2020-10-12 NOTE — FLOWSHEET NOTE
[address] THERAPY  [] EVALUATION  [x] DAILY NOTE (LAND) [] DAILY NOTE (AQUATIC ) [] PROGRESS NOTE [] DISCHARGE NOTE    [] [facility]   [] Ek[address]ina [address]    [] [address] [facility]   [x] Terance Snellen    Date: 10/12/2020  Patient Name:  Anitha La  : 1945  MRN: [id number]    Referring Practitioner Danna Gomez MD   Diagnosis No admission diagnoses are documented for this encounter. Treatment Diagnosis Impaired strength, impaired coordination, decreased balance   Date of Evaluation 20      Functional Outcome Measure Used Functional Impact Questionaire. Functional Outcome Score 49 (20)       Insurance: Primary: Payor: Aditi Ambriz /  /  / ,   Secondary: HUMANA   Authorization Information: Unlimited visits based on medical necessity   Visit # 2, 2/10 for progress note   Visits Allowed: Unlimited visits based on medical necessity   Recertification Date:    Physician Follow-Up: 10/1/2020   Physician Orders: OT eval and treat, increased falls. Pertinent History: Patient and patient's wife reports he has had an increased amount of falls - five times in the last 2 weeks as well as some increased memory issues. Patient's wife reports he had a knot on his head one of the times. No bleed was noted on follow up tests. SUBJECTIVE: Patient's wife states he did not sleep well last night. OBJECTIVE:        TREATMENT   Precautions: Fall risk    Pain: No pain at rest.      X in shaded column indicates Activity Completed Today   Modalities Parameters/  Location  Notes/Comments                     Manual Therapy Time/  Technique  Notes/Comments                     Exercises   Sets/  Sec Reps  Notes/Comments   Sitting at edge of mat;   Active shoulder flexion and then reach down to toes 1 10 X Cues to count   Horizontal abduction/adduction  1 10 X    Open and close hands 1 10 X    Active elbow flexion/extension in sitting 1 10 X    Sitting dowel aaron for shoulder flexion  1 10 X Brings dowel aaron to ~ 90 degrees flexion   Sitting dowel aaron for chest press 1 12 X    Sitting dowel aaron horizontal abduction/adduction 1 10 X    Wrist and digit extension stretch on tabletop  10  2 X                                Activities Time    Notes/Comments   Patient stood at edge of table to complete reaching task clipping resistive clothespins on vertical pole. 5 minutes     Patient stood at edge of table completing  reciprocal marching with cues to bring knees up higher 5 minutes     Biodex UBE at 90 RPM 4 minutes all backwards X    Patient found 12 pennies in yellow soft putty for fine motor coordination task 4 minutes X    Orange putty for bilateral  strengthening 3 minutes X    Patient ambulated in clinic with SBA and cues for taking bigger steps  X            Specific Interventions Next Treatment: fine motor coordination, standing endurance/functional mobility, safety, ADLs, UE ROM and UE strengthening    Activity/Treatment Tolerance:  [x]  Patient tolerated treatment well  []  Patient limited by fatigue  []  Patient limited by pain   []  Patient limited by other medical complications  []  Other:     Assessment: Patient has increased falls at home as well as increased difficulty with fine motor coordination, UE strength and ROM, memory and following directions. Areas for Improvement: impaired balance, impaired cognition, impaired coordination, impaired ROM, impaired safety awareness and impaired strength  Prognosis: fair    Patient Education:   []  Education Completed: Plan of Care, Goals,    [address] Access Code for HEP:   []  No new Education completed  [x]  Reviewed Prior HEP, cues for exercise techniques      []  Patient verbalized and/or demonstrated understanding of education provided. []  Patient unable to verbalize and/or demonstrate understanding of education provided.   Will continue education. [x]  Barriers to learning: decreased memory and following directions    GOALS:  Patient Goal: Improve strength and balance. \"No more falls. \"    Short Term Goals:  Time Frame: 5 weeks  1. Patient will complete UE HEP with supervision from his wife for increased ease with dressing. 2.  Patient will increase right and left hand fine motor coordination for ease with tying shoes as demonstrated by completing 9 hole peg in 79 seconds on the right and 69 seconds on the left. 3.  Patient will increase right hand  strength to 30# and left hand  to 29# for ease with opening jars. 4.  Patient will increase right shoulder AROM flexion to 105, scaption to 90 and left shoulder flexion to 115, scaption to 90 degrees for increased ease with donning his shirt. Long Term Goals:  Time Frame: 10 weeks  1. Patient will require no more than 1 verbal cues for safe mobility in clinic during treatment session. 2.  Patient will require no more than 1 repeated instructions for activity during treatment session. PLAN:  Treatment Recommendations: Strengthening, Range of Motion, Home Exercise Program, Safety Education and Training and Coordination    []  Plan of care initiated. Plan to see patient 2 times per week for 8 weeks to address the treatment planned outlined above. [x]  Continue with current plan of care  []  Modify plan of care as follows:    []  Hold pending physician visit  []  Discharge    Time In 0906   Time Out 0945       Timed Code Minutes: Minutes Units   ADL (52553)     Aquatics (34827)     Manual Therapy (31474)     Neuro (27273)     Th. Activities (73219)     Th. Exercise (90271) 39 3   Wheelchair Mgmt (23900)     Cognitive Function (1st 15 min - 58052)     Cognitive Function (per sub. 15 min - 04405)     Ultrasound (83863)     Ionto (46474)     Manual E-Stim (91197)          TOTAL TREATMENT TIME: 39 minutes       Electronically Signed by:  Kevin Sanchez OTR/L #[id number]

## 2020-10-12 NOTE — PROGRESS NOTES
[address] THERAPY    [x] DAILY NOTE [] PROGRESS NOTE [] DISCHARGE NOTE    [][facility] [address]   [][place] [facility]   [x][facility]            Date: 10/12/2020  Patient Name: Anupama White      CSN: [id number]   : 1945  (70 y.o.)  Gender: male   Referring Physician:  Dr. Catalino Laboy   Diagnosis: Lewy body dementia with behavioral disturbance   Secondary Diagnosis: cognitive deficits, dysphonia, dysarthria, expressive and receptive language impairments  Insurance/Certification Information: Medicare. [employer] is secondary insurance. Visit number / total approved visits: 3 / Unlimited visits based on medical necessity. Visit count since last progress note:  3  Certification Date: 94/3/90  Last scheduled appointment: 2020  Date of Last MBS:  N/A  Diet: Regular solids. Thin liquids. Subjective: Patient pleasant and cooperative. Wife present for duration of session. Difficulty noted with picking up on questions presented to him throughout visit, often requiring multiple repetitions of stimuli. SHORT TERM GOAL #1:  Goal 1: The patient will demonstrate 70% success on orientation and basic personal information recall tasks given mod cues to use external aids or compensatory strategies to reduce confusion and promote insight to current situation. INTERVENTIONS: SLP provided patient with orientation questions to promote knowledge and understanding of current situation. RL - mod semantic cues   Year - forced choice visual cues  - spaced retrieval attempted at 30 seconds post presentation. Completed x2 trials with success only when given visual forced choice cues. Facility - max cues. Retrieval/recall at 30 seconds - min cues. 1 minute - independent. 2 minutes - independent     Discussed where to locate orientation information in the home - calendar, TV, radio, newspaper.      Personal info recall task session. LONG-TERM GOAL:  Goal 1: The patient will improve cognitive-communication functioning to the level of moderate impairment or above to promote improved safety, awareness/insight, and overall participation within the home and community environments. ONGOING. Assessment: [x]Progressing towards goals []Not Progressing towards goals  Plan for Next Session: orientation and personal info recall tasks, problem solving/reasoning/sequencing tasks, following 1 and 2 step directions, responsive/divergent/convergent naming tasks, visuospatial tasks   Patient Tolerance of Treatment:  []Tolerated well [x]Tolerated fair []Required rest breaks  []Fatigued    Education:  [x]Education was provided []Education not provided due to:  Learner:  [x]Patient [x]Significant Other (wife) []Other  Education provided regarding:  [x]Goals and POC []Diet and swallowing precautions    []Home Exercise Program []Progress and/or discharge information  Method of Education: [x]Discussion [x]Demonstration []Handout []Other:  Evaluation of Education:  [x]Verbalized understanding []Demonstrates without assistance  [x]Demonstrates with assistance [x]Needs further instruction    []No evidence of learning  []No family present      Plan: [x]Continue with current plan of care []Modify current plan of care   []Progress note - frequency and duration:   []Discharge notes - reason for discharge:     [x]Patient continues to require treatment by a licensed therapist to address functional deficits as outlined in the established plan of care. Certification required: [] Yes - See Physician Certification below.       [x] No       Time in: 0945  Time out: 1030  Untimed treatment: 0 minutes  Timed treatment: 45 minutes  Total time: 45 minutes      Bakari Bragg M.S., [address] [place] [address]

## 2020-10-15 NOTE — FLOWSHEET NOTE
[address] THERAPY  [] EVALUATION  [x] DAILY NOTE (LAND) [] DAILY NOTE (AQUATIC ) [] PROGRESS NOTE [] DISCHARGE NOTE    [] [facility]   [] Ek[address]    [] [address] [facility]   [x] Sasha Harmon    Date: 10/15/2020  Patient Name:  Soraya Honeycutt  : 1945  MRN: [id number]    Referring Practitioner Hillary Burroughs MD   Diagnosis No admission diagnoses are documented for this encounter. Treatment Diagnosis Impaired strength, impaired coordination, decreased balance   Date of Evaluation 20      Functional Outcome Measure Used Functional Impact Questionaire. Functional Outcome Score 49 (20)       Insurance: Primary: Payor: [employer] /  /  / ,   Secondary: HUMANA   Authorization Information: Unlimited visits based on medical necessity   Visit # 3, 3/10 for progress note   Visits Allowed: Unlimited visits based on medical necessity   Recertification Date:    Physician Follow-Up: 10/1/2020   Physician Orders: OT eval and treat, increased falls. Pertinent History: Patient and patient's wife reports he has had an increased amount of falls - five times in the last 2 weeks as well as some increased memory issues. Patient's wife reports he had a knot on his head one of the times. No bleed was noted on follow up tests. SUBJECTIVE: Patient pleasant and cooperative. OBJECTIVE:        TREATMENT   Precautions: Fall risk    Pain: No pain at rest.      X in shaded column indicates Activity Completed Today   Modalities Parameters/  Location  Notes/Comments                     Manual Therapy Time/  Technique  Notes/Comments                     Exercises   Sets/  Sec Reps  Notes/Comments   Sitting at edge of mat;   Active shoulder flexion and then reach down to toes while holding onto soccer ball  1 10 X Cues to count and for full ROM to floor   Horizontal abduction/adduction holding onto soccer ball  1 10 X    Open and close hands 1 10 X    Active elbow flexion/extension in sitting 1 10 X    Sitting dowel aaron for shoulder flexion  1 12 X Brings dowel aaron to ~ 90 degrees flexion   Sitting dowel aaron for chest press 1 12 X    Sitting dowel aaron horizontal abduction/adduction 1 10 X With guidance from therapist holding dowel aaron    Wrist and digit extension stretch on tabletop  5-10 secondsg 5 X    Red therabar for bends in supination and pronation and twists 1 each 10 X                         Activities Time    Notes/Comments   Patient stood at edge of table to complete reaching task clipping resistive clothespins on vertical pole. 5 minutes     Patient stood at edge of table completing  reciprocal marching with cues to bring knees up higher 5 minutes     Biodex UBE at 90 RPM 5 minutes all backwards X    Patient found 7 pennies in yellow soft putty for fine motor coordination task 4 minutes     Orange putty for bilateral  strengthening 3 minutes X    Patient ambulated in clinic with SBA and cues for taking bigger steps  X            Specific Interventions Next Treatment: fine motor coordination, standing endurance/functional mobility, safety, ADLs, UE ROM and UE strengthening    Activity/Treatment Tolerance:  [x]  Patient tolerated treatment well  []  Patient limited by fatigue  []  Patient limited by pain   []  Patient limited by other medical complications  []  Other:     Assessment: Patient is progressing slowly towards his goals. Repeated cues for directions of exercises. Patient Education:   []  Education Completed: Plan of Care, Goals,    [address] Access Code for HEP:   []  No new Education completed  [x]  Reviewed Prior HEP, cues for exercise techniques      []  Patient verbalized and/or demonstrated understanding of education provided. []  Patient unable to verbalize and/or demonstrate understanding of education provided. Will continue education.   [x]  Barriers to learning: decreased memory and following directions    GOALS:  Patient Goal: Improve strength and balance. \"No more falls. \"    Short Term Goals:  Time Frame: 5 weeks  1. Patient will complete UE HEP with supervision from his wife for increased ease with dressing. 2.  Patient will increase right and left hand fine motor coordination for ease with tying shoes as demonstrated by completing 9 hole peg in 79 seconds on the right and 69 seconds on the left. 3.  Patient will increase right hand  strength to 30# and left hand  to 29# for ease with opening jars. 4.  Patient will increase right shoulder AROM flexion to 105, scaption to 90 and left shoulder flexion to 115, scaption to 90 degrees for increased ease with donning his shirt. Long Term Goals:  Time Frame: 10 weeks  1. Patient will require no more than 1 verbal cues for safe mobility in clinic during treatment session. 2.  Patient will require no more than 1 repeated instructions for activity during treatment session. PLAN:  Treatment Recommendations: Strengthening, Range of Motion, Home Exercise Program, Safety Education and Training and Coordination    []  Plan of care initiated. Plan to see patient 2 times per week for 8 weeks to address the treatment planned outlined above. [x]  Continue with current plan of care  []  Modify plan of care as follows:    []  Hold pending physician visit  []  Discharge    Time In 0849   Time Out 0930       Timed Code Minutes: Minutes Units   ADL (76342)     Aquatics (22115)     Manual Therapy (29433)     Neuro (97760)     Th. Activities (20496)     Th. Exercise (17194) 41 3   Wheelchair Mgmt (64486)     Cognitive Function (1st 15 min - 30811)     Cognitive Function (per sub. 15 min - 47111)     Ultrasound (57664)     Ionto (19748)     Manual E-Stim (91234)          TOTAL TREATMENT TIME: 41 minutes       Electronically Signed by:  Aryan Youssef/DEBORA #[id number]

## 2020-10-15 NOTE — PROGRESS NOTES
[address] THERAPY    [x] DAILY NOTE [] PROGRESS NOTE [] DISCHARGE NOTE    []Outpatient [address]   [][place] [address]   [x][facility]            Date: 10/15/2020  Patient Name: Oh Martinez      CSN: [id number]   : 1945  (70 y.o.)  Gender: male   Referring Physician: Dr. Love Das   Diagnosis: Lewy body dementia with behavioral disturbance   Secondary Diagnosis: cognitive deficits, dysphonia, dysarthria, expressive and receptive language impairments  Insurance/Certification Information: Medicare. [employer] is secondary insurance. Visit number / total approved visits: 4 / Unlimited visits based on medical necessity. Visit count since last progress note:  4  Certification Date:   Last scheduled appointment: 2020  Date of Last MBS:  N/A  Diet: Regular solids. Thin liquids. Subjective: Patient pleasant and cooperative. Wife remained in waiting area this visit. Overall increased time required for processing all content presented to patient. SHORT TERM GOAL #1:  Goal 1: The patient will demonstrate 70% success on orientation and basic personal information recall tasks given mod cues to use external aids or compensatory strategies to reduce confusion and promote insight to current situation. INTERVENTIONS: SLP provided patient with orientation questions to promote knowledge and understanding of current situation. All content is temporal orientation this visit. Patient performance: 5/10 independent, 2/10 min cues, 1/10 max cues, 2/10 incorrect despite cues    Also reviewed therapy schedule ( / ) to promote improved understanding of weekly schedules. Patient was able to initiate conversation related to personal preferences and information upon seasonal related content questions.  Patient provided stories about his past work experiences and preferences related to being retired and \"not having a schedule\". SHORT TERM GOAL #2:  Goal 2: The patient will complete basic level problem solving, reasoning, and sequencing tasks with 70% accuracy given mod cues to promote improved ADL completion. INTERVENTIONS: Did not address this visit. SHORT TERM GOAL #3:  Goal 3: The patient will follow 1 and 2 step written or verbal directions with 80% success given mod cues to promote ability to complete ADLs and utilize visual aids in the home environment. INTERVENTIONS: Written 1-step directions are presented to patient. Patient is instructed to read the direction aloud then complete the action stated. Patient performance: 7/15 independent, 5/15 min cues, 3/15 incorrect. In error, patient omitting action completion component. Oral reading for all content is Fundation with independent self-corrections noted. Required multiple repetitions of task directions throughout task to facilitate accurate completion. SHORT TERM GOAL #4:  Goal 4: The patient will demonstrate 80% success on functional responsive/divergent/convergent naming tasks given mod cues to promote improved expression of wants/needs/opinions. INTERVENTIONS: Convergent naming for functional daily items is completed this session to promote word finding skills for expressive communication. SLP placed 3 photos in front of patient that belong to a concrete category and patient is tasked with stating the category. Performance: 1/2 independent, 1/2 mod phonemic cues. Patient also required extended time for processing. SHORT TERM GOAL #5:  Goal 5: The patient will complete functional visuo-spatial tasks with less than 3 errors per task, given mod cues, to promote improved safety inside and outside the home with ADLs. INTERVENTIONS: SLP provided patient with map (visual stimuli) of a park map and presented verbal questions related to location of items within the park.   Patient performance: 3/8 independent, 1/8 min cues, 1/8 mod cues, 2/8 max cues, 1/8 incorrect despite max cues. Patient benefited from repetition of stimuli, extended wait time, and visual cues of SLP pointing to map content. LONG-TERM GOAL:  Goal 1: The patient will improve cognitive-communication functioning to the level of moderate impairment or above to promote improved safety, awareness/insight, and overall participation within the home and community environments. ONGOING. Assessment: [x]Progressing towards goals []Not Progressing towards goals  Plan for Next Session: orientation and personal info recall tasks, problem solving/reasoning/sequencing tasks, following 1 and 2 step directions, responsive/divergent/convergent naming tasks, visuospatial tasks   Patient Tolerance of Treatment:  []Tolerated well [x]Tolerated fair []Required rest breaks  []Fatigued    Education:  [x]Education was provided []Education not provided due to:  Learner:  [x]Patient [x]Significant Other (wife) []Other  Education provided regarding:  [x]Goals and POC []Diet and swallowing precautions    []Home Exercise Program  []Progress and/or discharge information  Method of Education: [x]Discussion [x]Demonstration []Handout []Other:  Evaluation of Education:  [x]Verbalized understanding []Demonstrates without assistance  [x]Demonstrates with assistance [x]Needs further instruction    []No evidence of learning  []No family present      Plan: [x]Continue with current plan of care []Modify current plan of care   []Progress note - frequency and duration:   []Discharge notes - reason for discharge:     [x]Patient continues to require treatment by a licensed therapist to address functional deficits as outlined in the established plan of care. Certification required: [] Yes - See Physician Certification below.       [x] No       Time in: 0930  Time out: 1015  Untimed treatment: 0 minutes  Timed treatment: 45 minutes  Total time: 45 minutes      Eleanor Talbert M.S., [address] [place] [address]

## 2020-10-15 NOTE — PROGRESS NOTES
Alternating step touch on 6 inch step 6x  x             Specific Interventions Next Treatment: Transfer training/bed mobility; static and dynamic balance and gait activities     Activity/Treatment Tolerance:  [x]  Patient tolerated treatment well  []  Patient limited by fatigue  []  Patient limited by pain   []  Patient limited by medical complications  []  Other:     Assessment: Carlo  showed increased tolerance for exercise today. Making good effort with therapy. Body Structures/Functions/Activity Limitations: impaired activity tolerance, impaired balance, impaired cognition, impaired endurance, impaired safety awareness, impaired strength and abnormal gait  Prognosis: fair    GOALS:  Patient Goal: Minimize fall risk    Short Term Goals:  Time Frame: 3 weeks   1. Andreina Amezcua will demonstrate a good ankle strategy to correct for sway while standing without needing verbal cuing. 2. Abran's Ariza balance score will improve to 35/56 indicating a lessening fall risk. 3. Andreina Amezcua will be able to transfer from supine to side lying to facilitate greater ease with supine to sit transfers in/out of bed. Long Term Goals:  Time Frame: 6 weeks   1. Discharge with independent HEP  2. Abran's Ariza score will improve to 45/56 indicating mild fall risk. 3. Andreina Amezcua will perform all bed mobility and transfers independently as his trunk strength and mobility improves. Patient Education:   []  HEP/Education Completed: Plan of Care, Goals, review of [facility] [address] Access Code:  []  No new Education completed  []  Reviewed Prior HEP      [x]  Patient verbalized and/or demonstrated understanding of education provided. []  Patient unable to verbalize and/or demonstrate understanding of education provided. Will continue education.   [x]  Barriers to learning: cognitive changes making it difficulty to following verbal instruction    PLAN:  Treatment Recommendations: Strengthening, Range of Motion, Balance Training, Functional Mobility

## 2020-10-19 NOTE — PROGRESS NOTES
[address] [facility]  PHYSICAL THERAPY  [] EVALUATION  [x] DAILY NOTE (LAND) [] DAILY NOTE (AQUATIC ) [] PROGRESS NOTE [] DISCHARGE NOTE    [] [facility]   [] Ekaterina Shepard    [] [address] [facility]   [x] [facility]    Date: 10/19/2020  Patient Name:  Arianna Pink  : 1945  MRN: [id number]    Referring Practitioner Jie Rapp MD   Diagnosis No admission diagnoses are documented for this encounter. Treatment Diagnosis Difficulty walking    Date of Evaluation 20    Additional Pertinent History HTN; Lewy Body Dementia; Parkinson's       Functional Outcome Measure Used Ariza Balance Scale   Functional Outcome Score 26/56 (20)       Insurance: Primary: Payor: Jarrell Portillo /  /  / ,   Secondary: HU[employer]   Authorization Information: Pre-certification not required   Visit # 5, 5/10 for progress note   Visits Allowed: Based on medical necessity    Recertification Date: 2020   Physician Follow-Up:    Physician Orders:    History of Present Illness:      SUBJECTIVE: Pt slightly unstable upon arrival. Pt happy to be able to come to therapy. Pt has not fallen for 3 weeks. Dr rearranged meds and patient has been doing better.          TREATMENT   Precautions: Used GAIT BELT   Pain: denies    X in shaded column indicates activity completed today   Modalities Parameters/  Location  Notes                     Manual Therapy Time/Technique  Notes                     Exercise/Intervention   Notes   NuStep   x5 min x Seat 9, arms 9, load 5          In //: heel/toe raises, marches, mini squats x10 ea  x With bilat UE support on airex    3- way hip, hamstring curls x10 ea  x           rockerboard fwd, lat 15taps 20 sec balance x Max VCs and TCs for all activities, One UE support with balance, close SBA/CGA   Cone tap with therapist calling out extremity and color  2 min.  x           Tandem stance L/R   x    Walking in // bars:  fwd, lateral, retro CGA, sumit alonzo walking x2 laps  x           Seated: marching, LAQs, hip add with ball, hip abd with green T band    Pt instructed to use command words like \"squeeze\" and :open\" along with task   Alternating step touch on 6 inch step 6x  x             Specific Interventions Next Treatment: Transfer training/bed mobility; static and dynamic balance and gait activities     Activity/Treatment Tolerance:  [x]  Patient tolerated treatment well  []  Patient limited by fatigue  []  Patient limited by pain   []  Patient limited by medical complications  []  Other:     Assessment: Alpesh Rao decreased ability to weight shift with quick movement. Mild LOB at beginning of session. GAIT belt worn throughout session. Max cues for posture. Body Structures/Functions/Activity Limitations: impaired activity tolerance, impaired balance, impaired cognition, impaired endurance, impaired safety awareness, impaired strength and abnormal gait  Prognosis: fair    GOALS:  Patient Goal: Minimize fall risk    Short Term Goals:  Time Frame: 3 weeks   1. Alpesh Rao will demonstrate a good ankle strategy to correct for sway while standing without needing verbal cuing. 2. Abran's Ariza balance score will improve to 35/56 indicating a lessening fall risk. 3. Alpesh Rao will be able to transfer from supine to side lying to facilitate greater ease with supine to sit transfers in/out of bed. Long Term Goals:  Time Frame: 6 weeks   1. Discharge with independent HEP  2. Abran's Ariza score will improve to 45/56 indicating mild fall risk. 3. Alpesh Rao will perform all bed mobility and transfers independently as his trunk strength and mobility improves. Patient Education:   []  HEP/Education Completed: Plan of Care, Goals, review of Sherle Meigs [address] [place] [address] Access Code:  [x]  No new Education completed  []  Reviewed Prior HEP      []  Patient verbalized and/or demonstrated understanding of education provided.   []  Patient unable to verbalize and/or demonstrate understanding of education provided. Will continue education. [x]  Barriers to learning: cognitive changes making it difficulty to following verbal instruction    PLAN:  Treatment Recommendations: Strengthening, Range of Motion, Balance Training, Functional Mobility Training, Transfer Training, Endurance Training, Gait Training, Neuromuscular Re-education, Manual Therapy - Soft Tissue Mobilization, Manual Therapy - Joint Manipulation, Home Exercise Program, Patient Education and Safety Education and Training    []  Plan of care initiated. Plan to see patient 2 times per week for 6 weeks to address the treatment planned outlined above.   [x]  Continue with current plan of care  []  Modify plan of care as follows:    []  Hold pending physician visit  []  Discharge    Time In 1045   Time Out 1115   Timed Code Minutes: 30 min   Total Treatment Time: 30 min       Electronically Signed by: Esther Del Rio

## 2020-10-19 NOTE — PROGRESS NOTES
[address] THERAPY    [x] DAILY NOTE [] PROGRESS NOTE [] DISCHARGE NOTE    []Outpatient [address]   [][place] [facility]   [x][facility]            Date: 10/19/2020  Patient Name: Zenon Boggs      CSN: [id number]   : 1945  (70 y.o.)  Gender: male   Referring Physician: Dr. Erich Greer   Diagnosis: Lewy body dementia with behavioral disturbance   Secondary Diagnosis: cognitive deficits, dysphonia, dysarthria, expressive and receptive language impairments  Insurance/Certification Information: Medicare. [employer] is secondary insurance. Visit number / total approved visits: 5 / Unlimited visits based on medical necessity. Visit count since last progress note:  5  Certification Date: 19/2/10  Last scheduled appointment: 2020  Date of Last MBS:  N/A  Diet: Regular solids. Thin liquids. Subjective: Patient pleasant this session. Wife remained in waiting area this visit. SHORT TERM GOAL #1:  Goal 1: The patient will demonstrate 70% success on orientation and basic personal information recall tasks given mod cues to use external aids or compensatory strategies to reduce confusion and promote insight to current situation. INTERVENTIONS: SLP provided patient with orientation questions to promote knowledge and understanding of current situation. RL: mod verbal cues required    Year: unable despite mod cues   Address: mod verbal cues required. Completed during writing task. Patient required mod assist for spelling of '[place]', '[place]', and inclusion of '[place]'. Overall good success with recall.   Wife's full name: written and verbalized with 100% success independently   Daughter's first name: written and verbalized with 100% success independently  Son-in-law's name: written with mod assist for spelling, and verbalized 100% success independently  Grandchildren's names: mod visual (written family tree) and verbal cues     SHORT TERM GOAL #2:  Goal 2: The patient will complete basic level problem solving, reasoning, and sequencing tasks with 70% accuracy given mod cues to promote improved ADL completion. INTERVENTIONS: Verbal reasoning addressed within family naming and relation task. SLP provided visual aid of family tree to promote naming of his grandchildren. Patient with benefit from verbal cue \"Does Lynda Norris have a daughter? \" vs \"What are Luis's childrens' names? \". Reduced thought flexibility noted. SHORT TERM GOAL #3:  Goal 3: The patient will follow 1 and 2 step written or verbal directions with 80% success given mod cues to promote ability to complete ADLs and utilize visual aids in the home environment. INTERVENTIONS: Verbal 1-step directions are presented to patient within tasks addressing other goals. Good success noted when given intermittent repetition of stimuli / reminders for what task he is completing. No formal data taken due to informality of which this was addressed. SHORT TERM GOAL #4:  Goal 4: The patient will demonstrate 80% success on functional responsive/divergent/convergent naming tasks given mod cues to promote improved expression of wants/needs/opinions. INTERVENTIONS: Did not address due to focus on other goals. SHORT TERM GOAL #5:  Goal 5: The patient will complete functional visuo-spatial tasks with less than 3 errors per task, given mod cues, to promote improved safety inside and outside the home with ADLs. INTERVENTIONS: SLP provided patient with paper with blank [place] for clock drawing task. Patient tasked with filling in all digits in appropriate quadrants. Patient required mod-max support/assist to include all digits in appropriate areas. He also included lines from the center of the [place] to the edges. Did not include hand setting this trial due to disorganized visual content that would likely lead to increased confusion.  Patient benefited from stimuli repetition, extended wait time, and visual cues of pointing to locations where digits should be placed. Patient was occasionally able to independently determine which digit should be in that location, however mostly required logical cues \"what comes after 9?\"). Patient also tasked with writing name and address at top of page. Good success with relation to location near top of page. No spelling errors in his name. Patient tasked with writing date at bottom of the page. Required SLP to provide the date due to reduced orientation, however location on page is accurate independently. LONG-TERM GOAL:  Goal 1: The patient will improve cognitive-communication functioning to the level of moderate impairment or above to promote improved safety, awareness/insight, and overall participation within the home and community environments. ONGOING.       Assessment: [x]Progressing towards goals []Not Progressing towards goals  Plan for Next Session: orientation and personal info recall tasks, problem solving/reasoning/sequencing tasks, following 1 and 2 step directions, responsive/divergent/convergent naming tasks, visuospatial tasks   Patient Tolerance of Treatment:  [x]Tolerated well []Tolerated fair []Required rest breaks  []Fatigued    Education:  [x]Education was provided []Education not provided due to:  Learner:  [x]Patient [x]Significant Other (wife) []Other  Education provided regarding:  [x]Goals and POC []Diet and swallowing precautions    []Home Exercise Program  []Progress and/or discharge information  Method of Education: [x]Discussion [x]Demonstration []Handout []Other:  Evaluation of Education:  [x]Verbalized understanding []Demonstrates without assistance  [x]Demonstrates with assistance [x]Needs further instruction    []No evidence of learning  []No family present      Plan: [x]Continue with current plan of care []Modify current plan of care   []Progress note - frequency and duration:   []Discharge notes - reason for discharge:     [x]Patient continues to require treatment by a licensed therapist to address functional deficits as outlined in the established plan of care. Certification required: [] Yes - See Physician Certification below.       [x] No       Time in: 1000  Time out: 1045  Untimed treatment: 0 minutes  Timed treatment: 45 minutes  Total time: 45 minutes      Андрей Melton M.S., [address] [place] [address]

## 2020-10-26 NOTE — PROGRESS NOTES
[address] [facility]  PHYSICAL THERAPY  [] EVALUATION  [x] DAILY NOTE (LAND) [] DAILY NOTE (AQUATIC ) [] PROGRESS NOTE [] DISCHARGE NOTE    [] [facility]   [] [address]    [] [address] [facility]   [x] Karie Britt    Date: 10/26/2020  Patient Name:  Kenneth Hager  : 1945  MRN: [id number]    Referring Practitioner Adalid Segovia MD   Diagnosis No admission diagnoses are documented for this encounter. Treatment Diagnosis Difficulty walking    Date of Evaluation 20    Additional Pertinent History HTN; Lewy Body Dementia; Parkinson's       Functional Outcome Measure Used Ariza Balance Scale   Functional Outcome Score 26/56 (20)       Insurance: Primary: Payor: Ladi Chakraborty /  /  / ,   Secondary: HU[employer]   Authorization Information: Pre-certification not required   Visit # 5, 5/10 for progress note   Visits Allowed: Based on medical necessity    Recertification Date: 2020   Physician Follow-Up:    Physician Orders:    History of Present Illness:      SUBJECTIVE: Pt had a bad day last Thursday per wife. Pt fell in home but was not hurt. TREATMENT   Precautions: Used GAIT BELT   Pain: denies    X in shaded column indicates activity completed today   Modalities Parameters/  Location  Notes                     Manual Therapy Time/Technique  Notes                     Exercise/Intervention   Notes   NuStep   x5 min x Seat 9, arms 9, load 5          In //: heel/toe raises, marches, mini squats x10 ea  x With bilat UE support on airex    3- way hip, hamstring curls x10 ea  x           rockerboard fwd, lat 15taps 20 sec balance x Max VCs and TCs for all activities, One UE support with balance, close SBA/CGA   Cone tap with therapist calling out extremity and color  2 min.              Tandem stance L/R       Walking in // bars:  fwd, lateral, retro CGA, sumit clinton walking x2 laps  x CGA           Seated: marching 2#, LAQs 2#, hip add with ball, hip abd with orange T band 10x  x Pt instructed to use command words like \"squeeze\" and :open\" along with task   Alternating step touch on 6 inch step 6x             Sit <>stands from low mat table 10x  x Cues for technique, CGA          Supine- bridges 10x  x    LTR with therapy ball 10x  x      Specific Interventions Next Treatment: Transfer training/bed mobility; static and dynamic balance and gait activities     Activity/Treatment Tolerance:  [x]  Patient tolerated treatment well  []  Patient limited by fatigue  []  Patient limited by pain   []  Patient limited by medical complications  []  Other:     Assessment: Pt unstable with standing exercises this date with increased weakness in B hamstrings. Added supine exercises to HEP for added strengthening. Educated wife in gait belt use for safety at home. Body Structures/Functions/Activity Limitations: impaired activity tolerance, impaired balance, impaired cognition, impaired endurance, impaired safety awareness, impaired strength and abnormal gait  Prognosis: fair    GOALS:  Patient Goal: Minimize fall risk    Short Term Goals:  Time Frame: 3 weeks   1. Deo Parham will demonstrate a good ankle strategy to correct for sway while standing without needing verbal cuing. 2. Abran's Ariza balance score will improve to 35/56 indicating a lessening fall risk. 3. Deo Parham will be able to transfer from supine to side lying to facilitate greater ease with supine to sit transfers in/out of bed. Long Term Goals:  Time Frame: 6 weeks   1. Discharge with independent HEP  2. Abran's Ariza score will improve to 45/56 indicating mild fall risk. 3. Deo Parham will perform all bed mobility and transfers independently as his trunk strength and mobility improves.      Patient Education:   [x]  HEP/Education Completed: Anshul Ramos [facility] Access Code:  []  No new Education completed  []  Reviewed Prior HEP      [x]  Patient verbalized and/or demonstrated understanding of education provided. []  Patient unable to verbalize and/or demonstrate understanding of education provided. Will continue education. [x]  Barriers to learning: cognitive changes making it difficulty to following verbal instruction    PLAN:  Treatment Recommendations: Strengthening, Range of Motion, Balance Training, Functional Mobility Training, Transfer Training, Endurance Training, Gait Training, Neuromuscular Re-education, Manual Therapy - Soft Tissue Mobilization, Manual Therapy - Joint Manipulation, Home Exercise Program, Patient Education and Safety Education and Training    []  Plan of care initiated. Plan to see patient 2 times per week for 6 weeks to address the treatment planned outlined above.   [x]  Continue with current plan of care  []  Modify plan of care as follows:    []  Hold pending physician visit  []  Discharge    Time In 1030   Time Out 1118   Timed Code Minutes: 43 min   Total Treatment Time: 43 min       Electronically Signed by: Estefani Nunez

## 2020-10-26 NOTE — PROGRESS NOTES
[address] THERAPY    [x] DAILY NOTE [] PROGRESS NOTE [] DISCHARGE NOTE    []Outpatient [address]   [][place] [facility]   [x][facility]            Date: 10/26/2020  Patient Name: Zoey Simons      CSN: [id number]   : 1945  (70 y.o.)  Gender: male   Referring Physician: Dr. Booker Buerger   Diagnosis: Lewy body dementia with behavioral disturbance   Secondary Diagnosis: cognitive deficits, dysphonia, dysarthria, expressive and receptive language impairments  Insurance/Certification Information: Medicare. [employer] is secondary insurance. Visit number / total approved visits: 6 / Unlimited visits based on medical necessity. Visit count since last progress note:  6  Certification Date:   Last scheduled appointment: 2020  Date of Last MBS:  N/A  Diet: Regular solids. Thin liquids. Subjective: Patient pleasant this session. Wife remained in the waiting area to facilitate more independence for patient within the session. Patient required extended time for naming tasks however responded fairly quickly during spontaneous conversation. Feedback given to wife at conclusion of session. SHORT TERM GOAL #1:  Goal 1: The patient will demonstrate 70% success on orientation and basic personal information recall tasks given mod cues to use external aids or compensatory strategies to reduce confusion and promote insight to current situation. INTERVENTIONS: Patient and SLP had informal conversation regarding patient's past personal life. He independently recalled that he was stationed [facility] when in the [employer]. He independently recalls his age and length of time he was there and his branch as the [employer]. He recalls he also went to [facility] twice. 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 Max support required for patient to recall/figure his age at the time of his wedding. Utilized written math computation to facilitate problem solving. Patient unable to complete subtraction independently, SLP provided step by step assistance for computing. After solution is reached, SLP facilitated patient's recall of what the answer related to as he did not recall what the number '23' meant. SHORT TERM GOAL #2:  Goal 2: The patient will complete basic level problem solving, reasoning, and sequencing tasks with 70% accuracy given mod cues to promote improved ADL completion. INTERVENTIONS: Did not directly address this session. Attempted written math computation for '72 - 54' to calculate his age at his wedding. Patient required step by step assistance/cueing to complete calculation. SHORT TERM GOAL #3:  Goal 3: The patient will follow 1 and 2 step written or verbal directions with 80% success given mod cues to promote ability to complete ADLs and utilize visual aids in the home environment. INTERVENTIONS: Did not address this session. SHORT TERM GOAL #4:  Goal 4: The patient will demonstrate 80% success on functional responsive/divergent/convergent naming tasks given mod cues to promote improved expression of wants/needs/opinions. INTERVENTIONS: SLP facilitated patient's completion of naming tasks to promote improved expression skills for communication of wants/needs/opinions. Convergent naming for concrete categories: 1/5 independent, 2/5 with forced choice cues, 2/5 incorrect despite max cues  Divergent naming for concrete categories: 3/9 independent, 4/9 min cues, 1/9 mod cues. 1/9 max cues  Responsive namin/3 independent, 1/3 max cues, 1/3 incorrect despite max cues   Significantly slowed processing speed throughout these tasks.  Patient required multiple repetitions of stimuli and use of his name to re-direct him treatment: 44 minutes  Total time: 44 minutes      Kiara Loo M.S., [address] [place] [address]

## 2020-10-29 NOTE — PROGRESS NOTES
[address]  PHYSICAL THERAPY  [] EVALUATION  [x] DAILY NOTE (LAND) [] DAILY NOTE (AQUATIC ) [] PROGRESS NOTE [] DISCHARGE NOTE    [] [facility]   [] Ekaterina     [] [address] JULI   [x] Zaria Rosario    Date: 10/29/2020  Patient Name:  Logan Barnes  : 1945  MRN: [id number]    Referring Practitioner Ileana Herrera MD   Diagnosis No admission diagnoses are documented for this encounter. Treatment Diagnosis Difficulty walking    Date of Evaluation 20    Additional Pertinent History HTN; Lewy Body Dementia; Parkinson's       Functional Outcome Measure Used Ariza Balance Scale   Functional Outcome Score 26/56 (20)       Insurance: Primary: Payor: Mayra Sequeira /  /  / ,   Secondary: HU[employer]   Authorization Information: Pre-certification not required   Visit # 6, 6/10 for progress note   Visits Allowed: Based on medical necessity    Recertification Date: 2020   Physician Follow-Up:    Physician Orders:    History of Present Illness:      SUBJECTIVE: Doing okay today; standing still continues to be the toughest thing for him to do.         TREATMENT   Precautions: Used GAIT BELT   Pain: denies    X in shaded column indicates activity completed today   Modalities Parameters/  Location  Notes                     Manual Therapy Time/Technique  Notes                     Exercise/Intervention   Notes   NuStep   x5 min  Seat 9, arms 9, load 5          In //: heel/toe raises, marches, mini squats x10 ea  x With bilat UE support on airex    3- way hip, hamstring curls x10 ea             rockerboard fwd, lat 15taps 20 sec balance  Max VCs and TCs for all activities, One UE support with balance, close SBA/CGA   Cone tap with therapist calling out extremity and color  2 min.  x Tap with foot and cone           Tandem stance L/R       Walking in // bars:  fwd, lateral, retro CGA, sumit alonzo walking x2 laps  x CGA Seated: marching 2#, LAQs 2#, hip add with ball, hip abd with orange T band 10x  x Pt instructed to use command words like \"squeeze\" and :open\" along with task   Alternating step touch on 6 inch step 6x  x Stool in // bars           Sit <>stands from low mat table 10x  x Cues for technique, CGA          Supine- bridges 10x  x    LTR with therapy ball 10x  x           Supine cross body reach with rotation 6x   x    Seated overhead flexion and diagonals with red ball 6x  x    Seated cross body reaches/punches  10x  x      Specific Interventions Next Treatment: Transfer training/bed mobility; static and dynamic balance and gait activities     Activity/Treatment Tolerance:  [x]  Patient tolerated treatment well  []  Patient limited by fatigue  []  Patient limited by pain   []  Patient limited by medical complications  []  Other:     Assessment: Incorporated more rotation/cross body reaching today to work on improving bed mobility. Tanika Pendleton did fatigue with today's session as he spent most of the session on his feet. Body Structures/Functions/Activity Limitations: impaired activity tolerance, impaired balance, impaired cognition, impaired endurance, impaired safety awareness, impaired strength and abnormal gait  Prognosis: fair    GOALS:  Patient Goal: Minimize fall risk    Short Term Goals:  Time Frame: 3 weeks   1. Tanika Pendleton will demonstrate a good ankle strategy to correct for sway while standing without needing verbal cuing. 2. Abran's Ariza balance score will improve to 35/56 indicating a lessening fall risk. 3. Tanika Pendleton will be able to transfer from supine to side lying to facilitate greater ease with supine to sit transfers in/out of bed. Long Term Goals:  Time Frame: 6 weeks   1. Discharge with independent HEP  2. Abran's Ariza score will improve to 45/56 indicating mild fall risk. 3. Tanika Pendleton will perform all bed mobility and transfers independently as his trunk strength and mobility improves.      Patient Education:   [x]  HEP/Education Completed: [facility] Access Code:  []  No new Education completed  []  Reviewed Prior HEP      [x]  Patient verbalized and/or demonstrated understanding of education provided. []  Patient unable to verbalize and/or demonstrate understanding of education provided. Will continue education. [x]  Barriers to learning: cognitive changes making it difficulty to following verbal instruction    PLAN:  Treatment Recommendations: Strengthening, Range of Motion, Balance Training, Functional Mobility Training, Transfer Training, Endurance Training, Gait Training, Neuromuscular Re-education, Manual Therapy - Soft Tissue Mobilization, Manual Therapy - Joint Manipulation, Home Exercise Program, Patient Education and Safety Education and Training    []  Plan of care initiated. Plan to see patient 2 times per week for 6 weeks to address the treatment planned outlined above.   [x]  Continue with current plan of care  []  Modify plan of care as follows:    []  Hold pending physician visit  []  Discharge    Time In 1057   Time Out 1135   Timed Code Minutes: 38 min   Total Treatment Time: 38 min       Electronically Signed by: Cherylene Gottron

## 2020-10-29 NOTE — FLOWSHEET NOTE
[address] THERAPY  [] EVALUATION  [x] DAILY NOTE (LAND) [] DAILY NOTE (AQUATIC ) [] PROGRESS NOTE [] DISCHARGE NOTE    [] [facility]   [] [address]    [] [address] J[place]I   [x] Zaria Rosario    Date: 10/29/2020  Patient Name:  Logan Barnes  : 1945  MRN: [id number]    Referring Practitioner Ileana Herrera MD   Diagnosis No admission diagnoses are documented for this encounter. Treatment Diagnosis Impaired strength, impaired coordination, decreased balance   Date of Evaluation 20      Functional Outcome Measure Used Functional Impact Questionaire. Functional Outcome Score 49 (20)       Insurance: Primary: Payor: Mayra Sequeira /  /  / ,   Secondary: HU[employer]NA   Authorization Information: Unlimited visits based on medical necessity   Visit # 4, 4/10 for progress note   Visits Allowed: Unlimited visits based on medical necessity   Recertification Date:    Physician Follow-Up: 10/1/2020   Physician Orders: OT eval and treat, increased falls. Pertinent History: Patient and patient's wife reports he has had an increased amount of falls - five times in the last 2 weeks as well as some increased memory issues. Patient's wife reports he had a knot on his head one of the times. No bleed was noted on follow up tests. SUBJECTIVE: Patient pleasant and cooperative. OBJECTIVE:        TREATMENT   Precautions: Fall risk    Pain: No pain at rest.      X in shaded column indicates Activity Completed Today   Modalities Parameters/  Location  Notes/Comments                     Manual Therapy Time/  Technique  Notes/Comments                     Exercises   Sets/  Sec Reps  Notes/Comments   Sitting at edge of mat; Active shoulder flexion and then reach down to toes while holding onto soccer ball  1 10 X Visual demonstration from therapist and cues for full motion.      Horizontal abduction/adduction holding onto soccer ball  1 10 X Visual demonstration from therapist   Open and close hands 1 15 X    Active elbow flexion/extension in sitting 1 10 X    Sitting dowel aaron for shoulder flexion  1 12 X Brings dowel aaron to slightly over 90 degrees flexion   Sitting dowel aaron for chest press 1 12 X    Sitting dowel aaron horizontal abduction/adduction 1 10 X With guidance from therapist holding dowel aaron up to near 90 degrees   Wrist and digit extension stretch on tabletop  5-10 secondsg 5     Red therabar for bends in supination and pronation and twists 1 each 10     Pulleys for shoulder flexion  1 20 X    Red (15#) spring loaded gripper B hands 1 15 X    Red digit flex for gripping and digt strength 1 10 X    Activities Time    Notes/Comments   Patient stood at edge of table to complete reaching task clipping resistive clothespins on vertical pole. 5 minutes     Patient stood at edge of table completing  reciprocal marching with cues to bring knees up higher 5 minutes     Biodex UBE at 90 RPM 5 minutes all backwards X    Fine motor coordination task of picking up and placing pennies in canister slot. Completed with right and then left hands 8 minutes X    Patient worked on fine motor coordination task of threading sewing cards with B hands 6 minutes X    Patient ambulated in clinic with light CGA and cues for taking bigger steps  X Minimal cues for turning in the right direction and next area to go in clinic. Specific Interventions Next Treatment: fine motor coordination, standing endurance/functional mobility, safety, ADLs, UE ROM and UE strengthening    Activity/Treatment Tolerance:  [x]  Patient tolerated treatment well  []  Patient limited by fatigue  []  Patient limited by pain   []  Patient limited by other medical complications  []  Other:     Assessment: Patient is progressing slowly towards his goals. Repeated cues for directions of exercises.       Patient Education:   [] Education Completed: Plan of Care, Goals,    [address] Access Code for HEP:   []  No new Education completed  [x]  Reviewed Prior HEP, cues for exercise techniques      []  Patient verbalized and/or demonstrated understanding of education provided. []  Patient unable to verbalize and/or demonstrate understanding of education provided. Will continue education. [x]  Barriers to learning: decreased memory and following directions    GOALS:  Patient Goal: Improve strength and balance. \"No more falls. \"    Short Term Goals:  Time Frame: 5 weeks  1. Patient will complete UE HEP with supervision from his wife for increased ease with dressing. 2.  Patient will increase right and left hand fine motor coordination for ease with tying shoes as demonstrated by completing 9 hole peg in 79 seconds on the right and 69 seconds on the left. 3.  Patient will increase right hand  strength to 30# and left hand  to 29# for ease with opening jars. 4.  Patient will increase right shoulder AROM flexion to 105, scaption to 90 and left shoulder flexion to 115, scaption to 90 degrees for increased ease with donning his shirt. Long Term Goals:  Time Frame: 10 weeks  1. Patient will require no more than 1 verbal cues for safe mobility in clinic during treatment session. 2.  Patient will require no more than 1 repeated instructions for activity during treatment session. PLAN:  Treatment Recommendations: Strengthening, Range of Motion, Home Exercise Program, Safety Education and Training and Coordination    []  Plan of care initiated. Plan to see patient 2 times per week for 8 weeks to address the treatment planned outlined above.   [x]  Continue with current plan of care  []  Modify plan of care as follows:    []  Hold pending physician visit  []  Discharge    Time In 1019   Time Out 1100       Timed Code Minutes: Minutes Units   ADL ([id number])     Aquatics (41247)     Manual Therapy (33768)     Neuro (75130)     Th. Activities (53941)     Th. Exercise (52754) 41 3   Wheelchair Mgmt (46006)     Cognitive Function (1st 15 min - 73661)     Cognitive Function (per sub. 15 min - 10212)     Ultrasound (39633)     Ionto (39852)     Manual E-Stim (33275)          TOTAL TREATMENT TIME: 41 minutes       Electronically Signed by:  Lou Cheng OTVANESSA/L #[id number]

## 2020-10-30 NOTE — PROGRESS NOTES
[address] THERAPY    [x] DAILY NOTE [] PROGRESS NOTE [] DISCHARGE NOTE    []Outpatient [address]   [][place] [facility]   [x][facility]            Date: 10/30/2020  Patient Name: Arun Funk      CSN: [id number]   : 1945  (70 y.o.)  Gender: male   Referring Physician: Dr. Arturo Harris   Diagnosis: Lewy body dementia with behavioral disturbance   Secondary Diagnosis: cognitive deficits, dysphonia, dysarthria, expressive and receptive language impairments  Insurance/Certification Information: Medicare. [employer] is secondary insurance. Visit number / total approved visits: 7 / Unlimited visits based on medical necessity. Visit count since last progress note:  7  Certification Date:   Last scheduled appointment: 2020  Date of Last MBS:  N/A  Diet: Regular solids. Thin liquids. Subjective: Patient pleasant this session. Wife remained in the waiting area to allow patient increased independence with task completion. Feedback provided to wife at conclusion of session. SHORT TERM GOAL #1:  Goal 1: The patient will demonstrate 70% success on orientation and basic personal information recall tasks given mod cues to use external aids or compensatory strategies to reduce confusion and promote insight to current situation. INTERVENTIONS: Did not directly address this date. Patient does independently recall wife, daughter, and son-in-law's names in later divergent naming task. SHORT TERM GOAL #2:  Goal 2: The patient will complete basic level problem solving, reasoning, and sequencing tasks with 70% accuracy given mod cues to promote improved ADL completion. INTERVENTIONS: SLP presented verbal basic reasoning task related to functional daily activities to promote safe decision making in the home environment. Patient performance is 7/10 independent, 1/10 min cues, 1/10 mod cues, 1/10 max cues.      SHORT TERM GOAL #3:  Goal 3: The patient will follow 1 and 2 step written or verbal directions with 80% success given mod cues to promote ability to complete ADLs and utilize visual aids in the home environment. INTERVENTIONS: SLP presented patient with written 1 step directions, tasking him with reading the direction aloud and carrying out the action to promote ability to utilize visual aids. Performance: 8/10 independent, 2/10 with min cues for executing direction that was read  SLP presented verbal 1-step directions. Patient performance is 8/8 independent. Increased complexity to verbal 2-step directions. Patient performance is 3/5 independent, 1/5 with one repetition of stimulus, and 1/5 with two repetitions of stimulus. SHORT TERM GOAL #4:  Goal 4: The patient will demonstrate 80% success on functional responsive/divergent/convergent naming tasks given mod cues to promote improved expression of wants/needs/opinions. INTERVENTIONS: SLP facilitated patient's completion of naming tasks to promote improved expression skills for communication of wants/needs/opinions. Divergent naming for family members: 3/5 independent ([address]), max cues for 2/5 (alirio Foster Ra). Divergent naming for vehicles: 5/5 independent ([facility], [place], [facility], KATEWIVERONICA, [place])    SHORT TERM GOAL #5:  Goal 5: The patient will complete functional visuo-spatial tasks with less than 3 errors per task, given mod cues, to promote improved safety inside and outside the home with ADLs. INTERVENTIONS: Visuo-spatial trail making task with digits 1-10 presented. Patient required step by step cues (verbal and visual cues from digit to digit). 5 errors occur through task (patient frequently re-tracing previously made line to previous digit vs moving forward to next digit). Fair-good independent stating and locating of upcoming digit with multiple repetitions of the current digit he was on.  Tasked with writing his full name at the top of the page. Patient required redirection to task x3 after his writing attempt was not carried through due to loss of attention, but did place it appropriately on the top of the page independently. LONG-TERM GOAL:  Goal 1: The patient will improve cognitive-communication functioning to the level of moderate impairment or above to promote improved safety, awareness/insight, and overall participation within the home and community environments. ONGOING. Assessment: [x]Progressing towards goals []Not Progressing towards goals  Plan for Next Session: orientation and personal info recall tasks, problem solving/reasoning/sequencing tasks, following 1 and 2 step directions, responsive/divergent/convergent naming tasks, visuospatial tasks   Patient Tolerance of Treatment:  []Tolerated well [x]Tolerated fair []Required rest breaks  []Fatigued    Education:  [x]Education was provided []Education not provided due to:  Learner:  [x]Patient [x]Significant Other (wife) []Other  Education provided regarding:  [x]Goals and POC []Diet and swallowing precautions    []Home Exercise Program  []Progress and/or discharge information  Method of Education: [x]Discussion [x]Demonstration []Handout []Other:  Evaluation of Education:  [x]Verbalized understanding []Demonstrates without assistance  [x]Demonstrates with assistance [x]Needs further instruction    []No evidence of learning  []No family present      Plan: [x]Continue with current plan of care []Modify current plan of care   []Progress note - frequency and duration:   []Discharge notes - reason for discharge:     [x]Patient continues to require treatment by a licensed therapist to address functional deficits as outlined in the established plan of care. Certification required: [] Yes - See Physician Certification below.       [x] No       Time in: 1000  Time out: 1045  Untimed treatment: 0 minutes  Timed treatment: 45 minutes  Total time: 45 minutes      Hattie Calvillo M.S., [facility] [address]

## 2020-11-02 NOTE — PROGRESS NOTES
[address] [facility]  PHYSICAL THERAPY  [] EVALUATION  [x] DAILY NOTE (LAND) [] DAILY NOTE (AQUATIC ) [] PROGRESS NOTE [] DISCHARGE NOTE    [] [address] [facility]   [] Ekaterina Shepard    [] [address] JULI   [x] Zaria Rosario    Date: 2020  Patient Name:  Logan Barnes  : 1945  MRN: [id number]    Referring Practitioner Ileana Herrera MD   Diagnosis No admission diagnoses are documented for this encounter. Treatment Diagnosis Difficulty walking    Date of Evaluation 20    Additional Pertinent History HTN; Lewy Body Dementia; Parkinson's       Functional Outcome Measure Used Ariza Balance Scale   Functional Outcome Score 26/56 (20)       Insurance: Primary: Payor: MEDICARE /  /  / ,   Secondary: HUMANA   Authorization Information: Pre-certification not required   Visit # 7, 710 for progress note   Visits Allowed: Based on medical necessity    Recertification Date: 2020   Physician Follow-Up:    Physician Orders:    History of Present Illness:      SUBJECTIVE: Sleepy today. .. TREATMENT   Precautions: Used GAIT BELT   Pain: denies    X in shaded column indicates activity completed today   Modalities Parameters/  Location  Notes                     Manual Therapy Time/Technique  Notes                     Exercise/Intervention   Notes   NuStep   x5 min  Seat 9, arms 9, load 5          In //: heel/toe raises, marches, mini squats; hurdles  x10 ea  x With bilat UE support on airex    3- way hip, hamstring curls x10 ea             rockerboard fwd, lat 15taps 20 sec balance  Max VCs and TCs for all activities, One UE support with balance, close SBA/CGA   Cone tap with therapist calling out extremity and color  2 min.    Tap with foot and cone    // bars: forward step; retro step x10  x    Tandem stance L/R       Walking in // bars:  fwd, lateral, retro CGA, sumit clinton walking x2 laps  x CGA           Seated: marching 2#, LAQs 2#, hip add with ball, hip abd with orange T band 10x  x Pt instructed to use command words like \"squeeze\" and :open\" along with task   Alternating step touch on 6 inch step 6x  x Stool in // bars           Sit <>stands from low mat table 10x  x Cues for technique, CGA          Supine- bridges 10x  x    LTR with therapy ball 10x  x           Supine cross body reach with rotation 6x       Seated overhead flexion and diagonals with red ball 6x  x    Seated cross body reaches/punches  10x  x      Specific Interventions Next Treatment: Transfer training/bed mobility; static and dynamic balance and gait activities     Activity/Treatment Tolerance:  [x]  Patient tolerated treatment well  []  Patient limited by fatigue  []  Patient limited by pain   []  Patient limited by medical complications  []  Other:     Assessment: Struggled with processing today; required increase in verbal and tactile cues for all exercises. Will continue to work on stepping back to address tendency to just fall backwards. Body Structures/Functions/Activity Limitations: impaired activity tolerance, impaired balance, impaired cognition, impaired endurance, impaired safety awareness, impaired strength and abnormal gait  Prognosis: fair    GOALS:  Patient Goal: Minimize fall risk    Short Term Goals:  Time Frame: 3 weeks   1. Karen Gan will demonstrate a good ankle strategy to correct for sway while standing without needing verbal cuing. 2. Abran's Ariza balance score will improve to 35/56 indicating a lessening fall risk. 3. Karen Gan will be able to transfer from supine to side lying to facilitate greater ease with supine to sit transfers in/out of bed. Long Term Goals:  Time Frame: 6 weeks   1. Discharge with independent HEP  2. Arban's Ariza score will improve to 45/56 indicating mild fall risk. 3. Karen Gan will perform all bed mobility and transfers independently as his trunk strength and mobility improves.      Patient Education:   [x] HEP/Education Completed: Larisa Ricco Vadim Access Code:  []  No new Education completed  []  Reviewed Prior HEP      [x]  Patient verbalized and/or demonstrated understanding of education provided. []  Patient unable to verbalize and/or demonstrate understanding of education provided. Will continue education. [x]  Barriers to learning: cognitive changes making it difficulty to following verbal instruction    PLAN:  Treatment Recommendations: Strengthening, Range of Motion, Balance Training, Functional Mobility Training, Transfer Training, Endurance Training, Gait Training, Neuromuscular Re-education, Manual Therapy - Soft Tissue Mobilization, Manual Therapy - Joint Manipulation, Home Exercise Program, Patient Education and Safety Education and Training    []  Plan of care initiated. Plan to see patient 2 times per week for 6 weeks to address the treatment planned outlined above.   [x]  Continue with current plan of care  []  Modify plan of care as follows:    []  Hold pending physician visit  []  Discharge    Time In 0945   Time Out 1025   Timed Code Minutes: 40 min   Total Treatment Time: 40 min       Electronically Signed by: Davie Riley

## 2020-11-02 NOTE — PROGRESS NOTES
[address] THERAPY  [] EVALUATION  [x] DAILY NOTE (LAND) [] DAILY NOTE (AQUATIC ) [] PROGRESS NOTE [] DISCHARGE NOTE    [] [facility]   [] [address]    [] [address] [employer]   [x] Antionette Lights    Date: 2020  Patient Name:  Jovon Mliian  : 1945  MRN: [id number]    Referring Practitioner Jojo Baez MD   Diagnosis No admission diagnoses are documented for this encounter. Treatment Diagnosis Impaired strength, impaired coordination, decreased balance   Date of Evaluation 20      Functional Outcome Measure Used Functional Impact Questionaire. Functional Outcome Score 49 (20)       Insurance: Primary: Payor: Lyndon Bass /  /  / ,   Secondary: HUMANA   Authorization Information: Unlimited visits based on medical necessity   Visit # 5, 5/10 for progress note   Visits Allowed: Unlimited visits based on medical necessity   Recertification Date:    Physician Follow-Up: 10/1/2020   Physician Orders: OT eval and treat, increased falls. Pertinent History: Patient and patient's wife reports he has had an increased amount of falls - five times in the last 2 weeks as well as some increased memory issues. Patient's wife reports he had a knot on his head one of the times. No bleed was noted on follow up tests. SUBJECTIVE: Patient pleasant and cooperative. Patient states he is better in the afternoon. OBJECTIVE:        TREATMENT   Precautions: Fall risk    Pain: No pain at rest.      X in shaded column indicates Activity Completed Today   Modalities Parameters/  Location  Notes/Comments                     Manual Therapy Time/  Technique  Notes/Comments                     Exercises   Sets/  Sec Reps  Notes/Comments   Sitting in chairt;   Active shoulder flexion and then reach down to toes while holding onto soccer ball  1 10 X Visual demonstration from therapist and cues for full motion. Horizontal abduction/adduction holding onto soccer ball sitting in chair 1 10 X Visual demonstration from therapist   Open and close hands 1 15 X    Active elbow flexion/extension in sitting 1 10 X    Sitting dowel aaron for shoulder flexion  1 12 X Brings dowel aaron to slightly over 90 degrees flexion   Sitting dowel aaron for chest press 1 12 X    Sitting dowel aaron horizontal abduction/adduction 1 10  With guidance from therapist holding dowel aaron up to near 90 degrees   Wrist and digit extension stretch on tabletop  5-10 seconds 5     Red therabar for bends in supination and pronation and twists 1 each 10     Pulleys for shoulder flexion  1 20     Red (15#) spring loaded gripper B hands 1 15     Red digit flex for gripping and digt strength 1 10     Activities Time    Notes/Comments   Patient stood at edge of table to complete reaching task clipping resistive clothespins on vertical pole. 5 minutes     Patient stood at edge of table completing  reciprocal marching with cues to bring knees up higher 5 minutes     [employer] UBE at 90 RPM 5 minutes all backwards X    Fine motor coordination task of picking up and placing pennies in canister slot. Completed with right and then left hands 8 minutes     Patient worked on fine motor coordination task of picking up and placing pegs for [employer]. 20 minutes X Patient required min/moderate assistance to find correct placement 50% of the time. Increased time for task. Several drops observed. Left hand dropped more often than right hand   Patient ambulated in clinic with light CGA and cues for taking bigger steps  X Minimal cues for going to the location indicated in clinic.            Specific Interventions Next Treatment: fine motor coordination, standing endurance/functional mobility, safety, ADLs, UE ROM and UE strengthening    Activity/Treatment Tolerance:  [x]  Patient tolerated treatment well  []  Patient limited by fatigue  []  Patient limited by pain   []  Patient limited by other medical complications  []  Other:     Assessment: Patient is progressing slowly towards his goals. Repeated cues for directions of exercises. Patient Education:   []  Education Completed: Plan of Care, Goals,    Edwin Arvizu Access Code for HEP:   []  No new Education completed  [x]  Reviewed Prior HEP, cues for exercise techniques      []  Patient verbalized and/or demonstrated understanding of education provided. []  Patient unable to verbalize and/or demonstrate understanding of education provided. Will continue education. [x]  Barriers to learning: decreased memory and following directions    GOALS:  Patient Goal: Improve strength and balance. \"No more falls. \"    Short Term Goals:  Time Frame: 5 weeks  1. Patient will complete UE HEP with supervision from his wife for increased ease with dressing. 2.  Patient will increase right and left hand fine motor coordination for ease with tying shoes as demonstrated by completing 9 hole peg in 79 seconds on the right and 69 seconds on the left. 3.  Patient will increase right hand  strength to 30# and left hand  to 29# for ease with opening jars. 4.  Patient will increase right shoulder AROM flexion to 105, scaption to 90 and left shoulder flexion to 115, scaption to 90 degrees for increased ease with donning his shirt. Long Term Goals:  Time Frame: 10 weeks  1. Patient will require no more than 1 verbal cues for safe mobility in clinic during treatment session. 2.  Patient will require no more than 1 repeated instructions for activity during treatment session. PLAN:  Treatment Recommendations: Strengthening, Range of Motion, Home Exercise Program, Safety Education and Training and Coordination    []  Plan of care initiated. Plan to see patient 2 times per week for 8 weeks to address the treatment planned outlined above.   [x]  Continue with current plan of care  []  Modify plan of care as follows:    [] Hold pending physician visit  []  Discharge    Time In 0858   Time Out 0943       Timed Code Minutes: Minutes Units   ADL (00447)     Aquatics (42159)     Manual Therapy (25089)     Neuro (01896)     Th. Activities (85491) 21 1 Th. Exercise (84026) 25 2   Wheelchair Mgmt (36059)     Cognitive Function (1st 15 min - 16310)     Cognitive Function (per sub. 15 min - 99881)     Ultrasound (80412)     Ionto (20234)     Manual E-Stim (18538)          TOTAL TREATMENT TIME: 45 minutes       Electronically Signed by:  Duarte BULLOCK/DEBORA #[id number]

## 2020-11-02 NOTE — FLOWSHEET NOTE
[address] [place] [address]  OUTPATIENT SPEECH THERAPY  ** PLEASE SIGN, DATE AND TIME CERTIFICATION BELOW AND RETURN TO [facility] OUTPATIENT REHABILITATION (FAX #: [phone]). ATTEST/CO-SIGN IF ACCESSING VIA [employer]. THANK YOU.**    I certify that I have examined the patient below and determined that Physical Medicine and Rehabilitation service is necessary and that I approve the established plan of care for up to 90 days or as specifically noted. Attestation, signature or co-signature of physician indicates approval of certification requirements.    ________________________ ____________ __________  Physician Signature   Date   Time      [] DAILY NOTE [x] PROGRESS NOTE [] DISCHARGE NOTE    []Outpatient [facility] [address]   [][address]   [x][facility] Memory: 2     Date: 2020  Patient Name: Cyril Sweeney      CSN: [id number]   : 1945  (70 y.o.)  Gender: male   Referring Physician: Dr. Venkat Cohn   Diagnosis: Lewy body dementia with behavioral disturbance   Secondary Diagnosis: cognitive deficits, dysphonia, dysarthria, expressive and receptive language impairments  Insurance/Certification Information: Medicare. [employer] is secondary insurance. Visit number / total approved visits: 8 / Unlimited visits based on medical necessity. Visit count since last progress note: 8  Certification Date:   Last scheduled appointment: 2020  Date of Last MBS:  N/A  Diet: Regular solids. Thin liquids. Subjective: Patient pleasant and cooperative, denies pain. Patient with poor attention to task this date, requiring several repetitions and redirections to therapeutic stimuli. Patient stated 'I'm sorry, I'm out in left field.'  Patient appeared to fall asleep x1 during session and was not responding to auditory stimuli.   After SLP gained patient's attention, he stated 'I was in deep thought.'  Overall, patient with limited utilize visual aids. Performance: 8/10 independent, 2/10 with min cues for executing direction that was read  SLP presented verbal 1-step directions. Patient performance is 8/8 independent. Increased complexity to verbal 2-step directions. Patient performance is 3/5 independent, 1/5 with one repetition of stimulus, and 1/5 with two repetitions of stimulus. SHORT TERM GOAL #4:  Goal 4: The patient will demonstrate 80% success on functional responsive/divergent/convergent naming tasks given mod cues to promote improved expression of wants/needs/opinions. GOAL MET. NEW GOAL: The patient will demonstrate 80% success on functional responsive/divergent/convergent naming tasks with no more than min cues to promote improved expression of wants/needs/opinions. INTERVENTIONS: Responsive namin/5 independent; 2/5 with min cues; 2/5 with mod cues     SHORT TERM GOAL #5:  Goal 5: The patient will complete functional visuo-spatial tasks with less than 3 errors per task, given mod cues, to promote improved safety inside and outside the home with ADLs. GOAL NOT MET. CONTINUE GOAL. INTERVENTIONS: Did not address due to focus on other goals. PREVIOUS SESSION: Visuo-spatial trail making task with digits 1-10 presented. Patient required step by step cues (verbal and visual cues from digit to digit). 5 errors occur through task (patient frequently re-tracing previously made line to previous digit vs moving forward to next digit). Fair-good independent stating and locating of upcoming digit with multiple repetitions of the current digit he was on. Tasked with writing his full name at the top of the page. Patient required redirection to task x3 after his writing attempt was not carried through due to loss of attention, but did place it appropriately on the top of the page independently.     LONG-TERM GOAL:  Goal 1: The patient will improve cognitive-communication functioning to the level of moderate impairment or above to promote improved safety, awareness/insight, and overall participation within the home and community environments. GOAL NOT MET. CONTINUE GOAL. Assessment: [x]Progressing towards goals []Not Progressing towards goals    The patient has met 2/5 short term goals within the progress report period. The patient continues to present with severe cognitive-communication impairments, affecting his ability to complete ADLs in the home setting and participate in social and community contexts. Reduced attention and comprehension adversely impact patient's ability to execute therapeutic requests, requiring several redirections, repetitions, and re-phrasing throughout therapy sessions. The patient is disoriented to most temporal and spatial orientation concepts, but does benefit from multiple choice cueing. Education provided this date to wife re: use of highly visual aids in the home (ie. White boards) to assist with temporal orientation concepts and to minimize confusion. Patient is oriented to some personal information, but does require assist for orientation to important phone numbers. Future interventions to include use of a memory book containing important personal information and phone numbers for patient's reference, pending patient's ability to follow and understand a memory book. The patient presents with good consistency in following 1 step verbal and written directions, allowing future interventions to focus on 2 step directions to support success with executing home and therapy directives. Patient's wife engaged and very supportive. Patient remains pleasant during therapy sessions and presents with intact long term memory as well as some signs of intact short term memory for functional concepts. For example, patient independently recalled the sex of this therapist's new baby (off on maternity leave until this date).   However, wife reports patient continues to have difficulty with short term memory in home setting, such as what he did previous date. Recommend continued speech therapy services to address the above goals to improve overall safety and participation in home and community settings. Plan for Next Session: orientation and personal info recall tasks, problem solving/reasoning/sequencing tasks, following 1 and 2 step directions, responsive/divergent/convergent naming tasks, visuospatial tasks   Patient Tolerance of Treatment:  []Tolerated well [x]Tolerated fair []Required rest breaks  [x]Fatigued    Education:  [x]Education was provided []Education not provided due to:  Learner:  [x]Patient [x]Significant Other (wife) []Other  Education provided regarding:  [x]Goals and POC []Diet and swallowing precautions    [x]Home Exercise Program: external aids to support orientation in home setting   [x]Progress and/or discharge information  Method of Education: [x]Discussion [x]Demonstration []Handout []Other:  Evaluation of Education:  [x]Verbalized understanding []Demonstrates without assistance  [x]Demonstrates with assistance [x]Needs further instruction    []No evidence of learning  []No family present      Plan: []Continue with current plan of care []Modify current plan of care   [x]Progress note - frequency and duration: 2x/week for 5 weeks    []Discharge notes - reason for discharge:     [x]Patient continues to require treatment by a licensed therapist to address functional deficits as outlined in the established plan of care. Certification required: [x] Yes - See Physician Certification above.       [] No       Time in: 1035  Time out: 1110  Untimed treatment: 0 minutes  Timed treatment: 35 minutes  Total time: 35 minutes      Dara Benavidez [address], [address] [facility]

## 2020-11-05 NOTE — PROGRESS NOTES
[address]  [] EVALUATION  [x] DAILY NOTE (LAND) [] DAILY NOTE (AQUATIC ) [] PROGRESS NOTE [] DISCHARGE NOTE    [] [address] [facility]   [] [address]    [] [address] [facility] [facility] [address]   [x] Troy Roof    Date: 2020  Patient Name:  Delores Garcia  : 1945  MRN: [id number]    Referring Practitioner Jackie Ramirez MD   Diagnosis No admission diagnoses are documented for this encounter. Treatment Diagnosis Impaired strength, impaired coordination, decreased balance   Date of Evaluation 20      Functional Outcome Measure Used Functional Impact Questionaire. Functional Outcome Score 49 (20)       Insurance: Primary: Payor: Luz Le /  /  / ,   Secondary: HUMANA   Authorization Information: Unlimited visits based on medical necessity   Visit # 6, 6/10 for progress note   Visits Allowed: Unlimited visits based on medical necessity   Recertification Date:    Physician Follow-Up: 10/1/2020   Physician Orders: OT eval and treat, increased falls. Pertinent History: Patient and patient's wife reports he has had an increased amount of falls - five times in the last 2 weeks as well as some increased memory issues. Patient's wife reports he had a knot on his head one of the times. No bleed was noted on follow up tests. SUBJECTIVE: Patient pleasant and cooperative. OBJECTIVE:        TREATMENT   Precautions: Fall risk    Pain: No pain at rest.      X in shaded column indicates Activity Completed Today   Modalities Parameters/  Location  Notes/Comments                     Manual Therapy Time/  Technique  Notes/Comments                     Exercises   Sets/  Sec Reps  Notes/Comments   Sitting in chairt; Active shoulder flexion and then reach down to toes while holding onto soccer ball  1 10 X Visual demonstration from therapist and cues for full motion.      Holding onto soccer ball chest press 1 10 X Visual cues from therapist   Horizontal abduction/adduction holding onto soccer ball sitting in chair 1 10 X Visual demonstration from therapist   Open and close hands 1 15 X    Active elbow flexion/extension in sitting 1 10 X    Sitting dowel aaron for shoulder flexion  1 12 X Brings dowel aaron to slightly over 90 degrees flexion   Sitting dowel aaron for chest press 1 12 X    Sitting dowel aaron horizontal abduction/adduction 1 10  With guidance from therapist holding dowel aaron up to near 90 degrees   Wrist and digit extension stretch on tabletop  5-10 seconds 5     Red therabar for bends in supination and pronation and twists 1 each 10     Pulleys for shoulder flexion  1 20     Red (15#) spring loaded gripper B hands 1 15 X    Red digit flex for gripping and digt strength 1 10     Activities Time    Notes/Comments   Patient placed [employer] game pieces in slotted velazco using left and then right hand. 5 minutes X Instructed to do red and blue pattern. Cues for holding pegs vertically to be able to place them. Patient initially kept to alternating pattern but then stopped. Patient used green flexbar for rolling yellow putty on tabletop for shoulder flexion ROM 3 minutes X    Biodex UBE at 80 RPM. 2 minutes forward and 4 minutes backward 6 minutes  X    Fine motor coordination task of picking up and placing pennies in canister slot. Completed with right and then left hands 8 minutes     Patient worked on fine motor coordination task of picking up and placing pegs for [employer]. 20 minutes  Patient required min/moderate assistance to find correct placement 50% of the time. Increased time for task. Several drops observed. Left hand dropped more often than right hand   Patient ambulated in clinic with light CGA and cues for taking bigger steps  X Minimal/moderate cues for going to the location indicated in clinic.            Specific Interventions Next Treatment: fine motor coordination, standing endurance/functional mobility, safety, ADLs, UE ROM and UE strengthening    Activity/Treatment Tolerance:  [x]  Patient tolerated treatment well  []  Patient limited by fatigue  []  Patient limited by pain   []  Patient limited by other medical complications  []  Other:     Assessment: Patient is progressing slowly towards his goals. Repeated cues for directions of exercises. Patient Education:   []  Education Completed: Plan of Care, Goals,    Anastasia Heap Access Code for HEP:   []  No new Education completed  [x]  Reviewed Prior HEP, cues for exercise techniques and direction walking in clinic      []  Patient verbalized and/or demonstrated understanding of education provided. []  Patient unable to verbalize and/or demonstrate understanding of education provided. Will continue education. [x]  Barriers to learning: decreased memory and following directions    GOALS:  Patient Goal: Improve strength and balance. \"No more falls. \"    Short Term Goals:  Time Frame: 5 weeks  1. Patient will complete UE HEP with supervision from his wife for increased ease with dressing. 2.  Patient will increase right and left hand fine motor coordination for ease with tying shoes as demonstrated by completing 9 hole peg in 79 seconds on the right and 69 seconds on the left. 3.  Patient will increase right hand  strength to 30# and left hand  to 29# for ease with opening jars. 4.  Patient will increase right shoulder AROM flexion to 105, scaption to 90 and left shoulder flexion to 115, scaption to 90 degrees for increased ease with donning his shirt. Long Term Goals:  Time Frame: 10 weeks  1. Patient will require no more than 1 verbal cues for safe mobility in clinic during treatment session. 2.  Patient will require no more than 1 repeated instructions for activity during treatment session.       PLAN:  Treatment Recommendations: Strengthening, Range of Motion, Home Exercise Program, Safety Education and Training and Coordination    []  Plan of care initiated. Plan to see patient 2 times per week for 8 weeks to address the treatment planned outlined above. [x]  Continue with current plan of care  []  Modify plan of care as follows:    []  Hold pending physician visit  []  Discharge    Time In 0916   Time Out 0956       Timed Code Minutes: Minutes Units   ADL ([id number])     Aquatics (36822)     Manual Therapy (33711)     Neuro (36487)     Th. Activities (88004)     Th. Exercise (21636) 40 3   Wheelchair Mgmt (13848)     Cognitive Function (1st 15 min - 04744)     Cognitive Function (per sub. 15 min - 28304)     Ultrasound (82337)     Ionto (63955)     Manual E-Stim (92363)          TOTAL TREATMENT TIME: 40 minutes       Electronically Signed by:  Isa Wallis OTR/L #[id number]

## 2020-11-05 NOTE — PROGRESS NOTES
[address] [facility]  PHYSICAL THERAPY  [] EVALUATION  [x] DAILY NOTE (LAND) [] DAILY NOTE (AQUATIC ) [] PROGRESS NOTE [] DISCHARGE NOTE    [] [address] [facility]   [] [address]    [] [address] [facility]   [x] Aramis Saucedo    Date: 2020  Patient Name:  Neil Joshua  : 1945  MRN: [id number]    Referring Practitioner Noah Christianson MD   Diagnosis No admission diagnoses are documented for this encounter. Treatment Diagnosis Difficulty walking    Date of Evaluation 20    Additional Pertinent History HTN; Lewy Body Dementia; Parkinson's       Functional Outcome Measure Used Ariza Balance Scale   Functional Outcome Score 26/56 (20)       Insurance: Primary: Payor: Jie Grant /  /  / ,   Secondary: HUMANA   Authorization Information: Pre-certification not required   Visit # 5, 5/10 for progress note   Visits Allowed: Based on medical necessity    Recertification Date: 2020   Physician Follow-Up:    Physician Orders:    History of Present Illness:      SUBJECTIVE: Reports now resting better at night.         TREATMENT   Precautions:    Pain:0/10     X in shaded column indicates activity completed today   Modalities Parameters/  Location  Notes                     Manual Therapy Time/Technique  Notes                     Exercise/Intervention   Notes   NuStep   x6 min x Seat 9, arms 9, load 5          In //: heel/toe raises, marches, mini squats x10 ea  x With bilat UE support on airex    3- way hip, hamstring curls x10 ea  x           rockerboard fwd, lat   x Max VCs and TCs for all activities   [facility]  in //              Tandem stance L/R   x    Walking fwd, retro with hands held by therapist x2 laps  x    // bars: lateral walking, tandem walk x2 laps  x           Seated: marching, LAQs, hip add with ball, hip abd with green T band   x Max VCs   Alternating step touch on 6 inch step 6x  x             Specific Tissue Mobilization, Manual Therapy - Joint Manipulation, Home Exercise Program, Patient Education and Safety Education and Training    []  Plan of care initiated. Plan to see patient 2 times per week for 6 weeks to address the treatment planned outlined above.   [x]  Continue with current plan of care  []  Modify plan of care as follows:    []  Hold pending physician visit  []  Discharge    Time In 0955   Time Out 1035   Timed Code Minutes: 40 min   Total Treatment Time: 40 min       Electronically Signed by: Jillian Singletary [id number]

## 2020-11-05 NOTE — PROGRESS NOTES
Day of the Week: Required cued referral to calendar + Max cues to find on calendar   Date: Required cued referral to calendar + max cues to find on calendar   Year: independent   Location: independent   Town: [facility]      First name: independent  Wife's name: independent     Chapo Santos 2087 #2:  Goal 2: The patient will complete basic level problem solving, reasoning, and sequencing tasks with 70% accuracy given mod cues to promote improved ADL completion. INTERVENTIONS: Answering basic sequencing questions containing 'before' or 'after' prepositions to assess patient's understanding of proper order of events to complete tasks: 1/3 with min cues; 2/3 with max cues      SHORT TERM GOAL #3:  Goal 3: The patient will follow functional 2 step, concrete verbal and written directions with 80% accuracy, no cues, to promote ability to complete ADLs and utilize visual aids in the home environment. INTERVENTIONS:  2 step, concrete verbal directions (first, then directives): 2/4 with mod cues; 2/4 with max cues      SHORT TERM GOAL #4:  Goal 4: The patient will demonstrate 80% success on functional responsive/divergent/convergent naming tasks with no more than min cues to promote improved expression of wants/needs/opinions. INTERVENTIONS: Did not address due to focus on other goals. SHORT TERM GOAL #5:  Goal 5: The patient will complete functional visuo-spatial tasks with less than 3 errors per task, given mod cues, to promote improved safety inside and outside the home with ADLs. INTERVENTIONS: Telling time on analog clock: 2/5 independent; 1/5 with mod cues; 2/5 with max cues       LONG-TERM GOAL:  Goal 1: The patient will improve cognitive-communication functioning to the level of moderate impairment or above to promote improved safety, awareness/insight, and overall participation within the home and community environments.  ONGOING      Assessment: [x]Progressing towards goals []Not Progressing towards goals    Plan for Next Session: orientation and personal info recall tasks, problem solving/reasoning/sequencing tasks, following 1 and 2 step directions, responsive/divergent/convergent naming tasks, visuospatial tasks     Patient Tolerance of Treatment:  []Tolerated well []Tolerated fair []Required rest breaks  [x]Fatigued    Education:  [x]Education was provided []Education not provided due to:  Learner:  [x]Patient [x]Significant Other (wife) []Other  Education provided regarding:  [x]Goals and POC []Diet and swallowing precautions    [x]Home Exercise Program: ideas for memory aids to keep with patient as needed containing personal information   []Progress and/or discharge information  Method of Education: [x]Discussion []Demonstration []Handout []Other:  Evaluation of Education:  [x]Verbalized understanding []Demonstrates without assistance  [x]Demonstrates with assistance [x]Needs further instruction    []No evidence of learning  []No family present      Plan: [x]Continue with current plan of care []Modify current plan of care   []Progress note - frequency and duration: 2x/week for 5 weeks    []Discharge notes - reason for discharge:     [x]Patient continues to require treatment by a licensed therapist to address functional deficits as outlined in the established plan of care. Certification required: [] Yes - See Physician Certification above.       [x] No       Time in: 1045  Time out: 1125  Untimed treatment: 5 minutes  Timed treatment: 35 minutes  Total time: 40 minutes      Owen Benavidez [address], [address]

## 2020-11-09 NOTE — PROGRESS NOTES
[address] [facility]  PHYSICAL THERAPY  [] EVALUATION  [x] DAILY NOTE (LAND) [] DAILY NOTE (AQUATIC ) [] PROGRESS NOTE [] DISCHARGE NOTE    [] [address] [facility]   [] Ekaterina     [] [address] [facility]   [x] Debbie Saravia    Date: 2020  Patient Name:  Anupama White  : 1945  MRN: [id number]    Referring Practitioner Maria Teresa Lozano MD   Diagnosis No admission diagnoses are documented for this encounter.     Treatment Diagnosis Difficulty walking    Date of Evaluation 20    Additional Pertinent History HTN; Lewy Body Dementia; Parkinson's       Functional Outcome Measure Used Ariza Balance Scale   Functional Outcome Score 26/56 (20)       Insurance: Primary: Payor: Luis A Cee /  /  / ,   Secondary: Anh Roblesp: Pre-certification not required   Visit # 6, 6/10 for progress note   Visits Allowed: Based on medical necessity    Recertification Date: 2020   Physician Follow-Up:    Physician Orders:    History of Present Illness:      SUBJECTIVE: Feeling stiff all over         TREATMENT   Precautions:    Pain:0/10     X in shaded column indicates activity completed today   Modalities Parameters/  Location  Notes                     Manual Therapy Time/Technique  Notes                     Exercise/Intervention   Notes   NuStep   x6 min x Seat 9, arms 9, load 5          In //: heel/toe raises, marches, mini squats x10 ea  x With bilat UE support on airex    3- way hip, hamstring curls x10 ea  x           rockerboard fwd,    x Max VCs and TCs for all activities   [facility]  in //              Tandem stance L/R   x    Walking fwd, retro with hands held by therapist x2 laps  x    // bars: lateral walking, tandem walk x2 laps  x    // bars: harpal step over: forward and lateral  x4 laps   x    Seated: marching, LAQs, hip add with ball, hip abd with green T band   x Max VCs   Alternating step touch on 6 inch step 10x  x             Specific Interventions Next Treatment: Transfer training/bed mobility; static and dynamic balance and gait activities     Activity/Treatment Tolerance:  [x]  Patient tolerated treatment well  []  Patient limited by fatigue  []  Patient limited by pain   []  Patient limited by medical complications  []  Other:     Assessment: Ended treatment early today; Shirley Flores was struggling with processing today despite max verbal and manual cues from treating therapist and PTA. He will be re-evaluated at next apt. Body Structures/Functions/Activity Limitations: impaired activity tolerance, impaired balance, impaired cognition, impaired endurance, impaired safety awareness, impaired strength and abnormal gait  Prognosis: fair    GOALS:  Patient Goal: Minimize fall risk    Short Term Goals:  Time Frame: 3 weeks   1. Shirley Flores will demonstrate a good ankle strategy to correct for sway while standing without needing verbal cuing. 2. Abran's Ariza balance score will improve to 35/56 indicating a lessening fall risk. 3. Shirley Flores will be able to transfer from supine to side lying to facilitate greater ease with supine to sit transfers in/out of bed. Long Term Goals:  Time Frame: 6 weeks   1. Discharge with independent HEP  2. Abran's Ariza score will improve to 45/56 indicating mild fall risk. 3. Shirley Flores will perform all bed mobility and transfers independently as his trunk strength and mobility improves. Patient Education:   []  HEP/Education Completed: Plan of Care, Goals, review of Rina Novoa Record Access Code:  []  No new Education completed  []  Reviewed Prior HEP      [x]  Patient verbalized and/or demonstrated understanding of education provided. []  Patient unable to verbalize and/or demonstrate understanding of education provided. Will continue education.   [x]  Barriers to learning: cognitive changes making it difficulty to following verbal instruction    PLAN:  Treatment Recommendations: Strengthening, Range of Motion, Balance Training, Functional Mobility Training, Transfer Training, Endurance Training, Gait Training, Neuromuscular Re-education, Manual Therapy - Soft Tissue Mobilization, Manual Therapy - Joint Manipulation, Home Exercise Program, Patient Education and Safety Education and Training    []  Plan of care initiated. Plan to see patient 2 times per week for 6 weeks to address the treatment planned outlined above.   [x]  Continue with current plan of care  []  Modify plan of care as follows:    []  Hold pending physician visit  []  Discharge    Time In 0918   Time Out 0948   Timed Code Minutes: 30 min   Total Treatment Time: 30 min       Electronically Signed by: Deena Maher

## 2020-11-09 NOTE — PROGRESS NOTES
promote improved ADL completion. INTERVENTIONS: Did not address this session. SHORT TERM GOAL #3:  Goal 3: The patient will follow functional 2 step, concrete verbal and written directions with 80% accuracy, no cues, to promote ability to complete ADLs and utilize visual aids in the home environment. INTERVENTIONS: SLP presented verbal 2 step directions for patient to follow: 2/5 min cues, 3/5 max cues. Patient benefited from occasional stimulus repetitions throughout task due to fluctuating attention/alertness     SHORT TERM GOAL #4:  Goal 4: The patient will demonstrate 80% success on functional responsive/divergent/convergent naming tasks with no more than min cues to promote improved expression of wants/needs/opinions. INTERVENTIONS: Responsive naming task (sports related items) completed to promote improved word finding for expression of wants/needs/opinions: 3/8 independent, 1/8 mod phonemic cue, 1/8 max cues, 3/8 incorrect despite cues. SHORT TERM GOAL #5:  Goal 5: The patient will complete functional visuo-spatial tasks with less than 3 errors per task, given mod cues, to promote improved safety inside and outside the home with ADLs. INTERVENTIONS: Facilitated card sorting task (red and black piles) to improve visuo-spatial skills. Patient required multiple repetitions of instructions to initiate task and required SLP to make one red pile and one black pile. Patient with 4 uncorrected errors throughout task and also created an additional red pile and black pile (making 4 piles with no instruction to do so - had actually sorted cards by suit). Able to correct errors when provided card and instructed to place in appropriate pile. LONG-TERM GOAL:  Goal 1: The patient will improve cognitive-communication functioning to the level of moderate impairment or above to promote improved safety, awareness/insight, and overall participation within the home and community environments. ONGOING      Assessment: [x]Progressing towards goals []Not Progressing towards goals    Plan for Next Session: orientation and personal info recall tasks, problem solving/reasoning/sequencing tasks, following 1 and 2 step directions, responsive/divergent/convergent naming tasks, visuospatial tasks     Patient Tolerance of Treatment:  []Tolerated well []Tolerated fair []Required rest breaks  [x]Fatigued    Education:  [x]Education was provided []Education not provided due to:  Learner:  [x]Patient [x]Significant Other (wife) []Other  Education provided regarding:  [x]Goals and POC []Diet and swallowing precautions    []Home Exercise Program:    []Progress and/or discharge information  Method of Education: [x]Discussion []Demonstration []Handout []Other:  Evaluation of Education:  [x]Verbalized understanding []Demonstrates without assistance  [x]Demonstrates with assistance [x]Needs further instruction    []No evidence of learning  []No family present      Plan: [x]Continue with current plan of care []Modify current plan of care   []Progress note - frequency and duration: 2x/week for 5 weeks    []Discharge notes - reason for discharge:     [x]Patient continues to require treatment by a licensed therapist to address functional deficits as outlined in the established plan of care. Certification required: [] Yes - See Physician Certification above.       [x] No       Time in: 1042  Time out: 1127  Untimed treatment: 0 minutes  Timed treatment: 45 minutes  Total time: 45 minutes        Kiara Loo M.S., [address] [place] [address]

## 2020-11-09 NOTE — PROGRESS NOTES
[address] THERAPY  [] EVALUATION  [x] DAILY NOTE (LAND) [] DAILY NOTE (AQUATIC ) [] PROGRESS NOTE [] DISCHARGE NOTE    [] [address] [facility]   [] [address]    [] [address] [facility]   [x] Umu Zuniga    Date: 2020  Patient Name:  Yoana Lentz  : 1945  MRN: [id number]    Referring Practitioner Boyd Chambers MD   Diagnosis No admission diagnoses are documented for this encounter. Treatment Diagnosis Impaired strength, impaired coordination, decreased balance   Date of Evaluation 20      Functional Outcome Measure Used Functional Impact Questionaire. Functional Outcome Score 49 (20)       Insurance: Primary: Payor: Yaneth Rubio /  /  / ,   Secondary: HUMANA   Authorization Information: Unlimited visits based on medical necessity   Visit # 7, 7/10 for progress note   Visits Allowed: Unlimited visits based on medical necessity   Recertification Date:    Physician Follow-Up: 10/1/2020   Physician Orders: OT eval and treat, increased falls. Pertinent History: Patient and patient's wife reports he has had an increased amount of falls - five times in the last 2 weeks as well as some increased memory issues. Patient's wife reports he had a knot on his head one of the times. No bleed was noted on follow up tests. SUBJECTIVE: Patient with limited responses to questions and repeated instructions required. Patient     OBJECTIVE:        TREATMENT   Precautions: Fall risk    Pain: No pain at rest.      X in shaded column indicates Activity Completed Today   Modalities Parameters/  Location  Notes/Comments                     Manual Therapy Time/  Technique  Notes/Comments                     Exercises   Sets/  Sec Reps  Notes/Comments   Sitting in chair;   Active shoulder flexion and then reach down to toes while holding onto soccer ball  1 10 X Visual demonstration from therapist and cues for full motion. Holding onto soccer ball chest press 1 10 X Visual cues from therapist   Horizontal abduction/adduction holding onto soccer ball sitting in chair 1 10 X Tactile assist of therapist holding alto soccer ball with patient   Open and close hands 1 15 X    Active elbow flexion/extension in sitting 1 10 X    Sitting dowel aaron for shoulder flexion  1 12 X Brings dowel aaron to slightly over 90 degrees flexion   Sitting dowel aaron for chest press 1 12 X    Sitting dowel aaron horizontal abduction/adduction 1 10  With guidance from therapist holding dowel aaron up to near 90 degrees   Wrist and digit extension stretch on tabletop  5-10 seconds 5     Red therabar for bends in supination and pronation and twists 1 each 10 X    Rolling orange putty with therabar for hand coordination and strenghth   X    Pulleys for shoulder flexion  1 20     Red (15#) spring loaded gripper B hands 1 15 X    Red digit flex for gripping and digt strength 1 10     Activities Time    Notes/Comments   Patient placed Cranium game pieces in slotted velazco using left and then right hand. 7 minutes X          Biodex UBE at 80 RPM. 2 minutes forward and 4 minutes backward 6 minutes  X    Fine motor coordination task of picking up and placing pennies in canister slot. Completed with right and then left hands 8 minutes     Patient used therabars for \"drumming\" on blue physioball. Cues for pattern. Patient did not follow instructed pattern but did do small B wrist and elbow motions, hitting the physioball. 5 minutes X    Patient worked on fine motor coordination task of picking up and placing pegs for [employer]. 20 minutes  Patient required min/moderate assistance to find correct placement 50% of the time. Increased time for task. Several drops observed.   Left hand dropped more often than right hand   Patient ambulated in clinic with light CGA and cues for taking bigger steps  X Minimal/moderate cues for going to the location indicated in clinic. Specific Interventions Next Treatment: fine motor coordination, standing endurance/functional mobility, safety, ADLs, UE ROM and UE strengthening    Activity/Treatment Tolerance:  [x]  Patient tolerated treatment well  []  Patient limited by fatigue  []  Patient limited by pain   []  Patient limited by other medical complications  []  Other:     Assessment: Patient is progressing slowly towards his goals. Repeated cues for directions of exercises. Patient Education:   []  Education Completed: Plan of Care, Goals,    Gala Sizer Access Code for HEP:   []  No new Education completed  [x]  Reviewed Prior HEP, cues for exercise techniques and direction walking in clinic. Repeated cues for given tas      []  Patient verbalized and/or demonstrated understanding of education provided. []  Patient unable to verbalize and/or demonstrate understanding of education provided. Will continue education. [x]  Barriers to learning: decreased memory and following directions    GOALS:  Patient Goal: Improve strength and balance. \"No more falls. \"    Short Term Goals:  Time Frame: 5 weeks  1. Patient will complete UE HEP with supervision from his wife for increased ease with dressing. 2.  Patient will increase right and left hand fine motor coordination for ease with tying shoes as demonstrated by completing 9 hole peg in 79 seconds on the right and 69 seconds on the left. 3.  Patient will increase right hand  strength to 30# and left hand  to 29# for ease with opening jars. 4.  Patient will increase right shoulder AROM flexion to 105, scaption to 90 and left shoulder flexion to 115, scaption to 90 degrees for increased ease with donning his shirt. Long Term Goals:  Time Frame: 10 weeks  1. Patient will require no more than 1 verbal cues for safe mobility in clinic during treatment session.    2.  Patient will require no more than 1 repeated instructions for activity during treatment session. PLAN:  Treatment Recommendations: Strengthening, Range of Motion, Home Exercise Program, Safety Education and Training and Coordination    []  Plan of care initiated. Plan to see patient 2 times per week for 8 weeks to address the treatment planned outlined above. [x]  Continue with current plan of care  []  Modify plan of care as follows:    []  Hold pending physician visit  []  Discharge    Time In 0950   Time Out 1030       Timed Code Minutes: Minutes Units   ADL (88 [id number] 60)     Aquatics (95203)     Manual Therapy (49066)     Neuro (73978)     Th. Activities (70390) 12 1   Th. Exercise (64755) 28 2   Wheelchair Mgmt (69678)     Cognitive Function (1st 15 min - 01092)     Cognitive Function (per sub. 15 min - 18659)     Ultrasound (70511)     Ionto (88502)     Manual E-Stim (48954)          TOTAL TREATMENT TIME: 40 minutes       Electronically Signed by:  Rogelio BULLOCK/DEBORA #[id number]

## 2020-11-12 NOTE — PROGRESS NOTES
[address] THERAPY  [] EVALUATION  [x] DAILY NOTE (LAND) [] DAILY NOTE (AQUATIC ) [] PROGRESS NOTE [] DISCHARGE NOTE    [] [facility]   [] [address]    [] [address] [facility]   [x] Brigido Hummel    Date: 2020  Patient Name:  Sunny Simons  : 1945  MRN: [id number]    Referring Practitioner Rufino Hernandez MD   Diagnosis No admission diagnoses are documented for this encounter. Treatment Diagnosis Impaired strength, impaired coordination, decreased balance   Date of Evaluation 20      Functional Outcome Measure Used Functional Impact Questionaire. Functional Outcome Score 49 (20)       Insurance: Primary: Payor: Delwyn Osler /  /  / ,   Secondary: HUMANA   Authorization Information: Unlimited visits based on medical necessity   Visit # 8, 8/10 for progress note   Visits Allowed: Unlimited visits based on medical necessity   Recertification Date:    Physician Follow-Up: 10/1/2020   Physician Orders: OT eval and treat, increased falls. Pertinent History: Patient and patient's wife reports he has had an increased amount of falls - five times in the last 2 weeks as well as some increased memory issues. Patient's wife reports he had a knot on his head one of the times. No bleed was noted on follow up tests. SUBJECTIVE: Patient with inconsistent with following directions. Patient stated \"I felt like crap when I first got here but I feel pretty good now. \"  OBJECTIVE:        TREATMENT   Precautions: Fall risk    Pain: No pain at rest.      X in shaded column indicates Activity Completed Today   Modalities Parameters/  Location  Notes/Comments                     Manual Therapy Time/  Technique  Notes/Comments                     Exercises   Sets/  Sec Reps  Notes/Comments   Sitting in chair;   Active shoulder flexion and then reach down to toes while holding onto soccer ball  1 10 X Visual demonstration from therapist and cues for full motion. Increased time to initiate   Holding onto soccer ball chest press 1 10 X Visual cues from therapist, repeated directions needed   Horizontal abduction/adduction holding onto soccer ball sitting in chair 1 10 X Perseverated on doing this exercise rather than chest press   Open and close hands 1 15 X Cues to continue after 5 reps   Active elbow flexion/extension in sitting 1 4  Hand over hand assist.  Decreased attention   Sitting dowel aaron for shoulder flexion  1 12  Brings dowel aaron to slightly over 90 degrees flexion   Sitting dowel aaron for chest press 1 12     Sitting dowel aaron horizontal abduction/adduction 1 10  With guidance from therapist holding dowel aaron up to near 90 degrees   Wrist and digit extension stretch on tabletop  5-10 seconds 5     Red therabar for bends in supination and pronation and twists 1 each 10     Rolling orange putty with therabar for hand coordination and strenghth       Pulleys for shoulder flexion  1 20     Red (15#) spring loaded gripper B hands 1 15     Red digit flex for gripping and digt strength 1 10 X    Activities Time    Notes/Comments   Patient placed Cranium game pieces in slotted velazco using left and then right hand. 7 minutes           Biodex UBE at 80 RPM. 1 minute forward and 5 minutes backward 6 minutes  X Hand over hand to find left handle today. Fine motor coordination task of picking up and placing pennies in canister slot. Completed with right and then left hands 8 minutes     Patient used therabars for \"drumming\" on blue physioball. Cues for pattern. Patient did not follow instructed pattern but did do small B wrist and elbow motions, hitting the physioball. 5 minutes X    Patient worked on fine motor coordination task of picking up and placing pegs for [employer]. 4 minutes X Cues for location of piece. Cues for attention.   Patient had difficulty with telling if he had a peg in his hand donning his shirt. Long Term Goals:  Time Frame: 10 weeks  1. Patient will require no more than 1 verbal cues for safe mobility in clinic during treatment session. 2.  Patient will require no more than 1 repeated instructions for activity during treatment session. PLAN:  Treatment Recommendations: Strengthening, Range of Motion, Home Exercise Program, Safety Education and Training and Coordination    []  Plan of care initiated. Plan to see patient 2 times per week for 8 weeks to address the treatment planned outlined above. [x]  Continue with current plan of care  []  Modify plan of care as follows:    []  Hold pending physician visit  []  Discharge    Time In 0916   Time Out 1000       Timed Code Minutes: Minutes Units   ADL (88 649 24 60)     Aquatics (89691)     Manual Therapy (73645)     Neuro (83642)     Th. Activities (45655) 9 1   Th. Exercise (63926) 35 2   Wheelchair Mgmt (13638)     Cognitive Function (1st 15 min - 52616)     Cognitive Function (per sub. 15 min - 61928)     Ultrasound (55690)     Ionto (25998)     Manual E-Stim (65777)          TOTAL TREATMENT TIME: 44 minutes       Electronically Signed by:  Nadine Layne OTVANESSA/L #[id number]

## 2020-11-12 NOTE — PROGRESS NOTES
[address] THERAPY      [x] DAILY NOTE [] PROGRESS NOTE [] DISCHARGE NOTE    [][facility]patient [address]   [][place] [facility]   [x][facility]               Date: 2020  Patient Name: Blake Headings      CSN: [id number]   : 1945  (70 y.o.)  Gender: male   Referring Physician: Dr. Christine Clayton   Diagnosis: Lewy body dementia with behavioral disturbance   Secondary Diagnosis: cognitive deficits, dysphonia, dysarthria, expressive and receptive language impairments  Insurance/Certification Information: Medicare. [employer] is secondary insurance. Visit number / total approved visits: 6 / Unlimited visits based on medical necessity. Visit count since last progress note: 3  Certification Date:   Last scheduled appointment: 12/10/2020  Date of Last MBS:  N/A  Diet: Regular solids. Thin liquids. Subjective: Patient with significant confusion this date. Patient presented with disorientation to speech room, requiring max redirection from ST and wife to transition into speech room. Patient reported 'I'm confused' and 'I don't even know where I am'. Patient with poor attention and wavering alertness throughout session, significantly limiting interventions. Patient was noted to engage in a 'picking up' motion for things that did not exist.  Patient reported he was 'looking for his keys' as he searched the floor but was able to be re-oriented when SLP asked 'who drove you here' - patient responded 'Kaia Larve' and was able to respond that she would have the keys. Discussed patient's tolerance of therapy with wife after session. Wife states that patient's medications can cause him to hallucinate. Suggested wife call referring physician re: his current status as his next appointment is not until 2021. Discontinued session early due to patient's poor tolerance.       SHORT TERM GOAL #1:  Goal 1: The patient will demonstrate 70% success on orientation and basic personal information recall tasks given mod cues to use external aids or compensatory strategies to reduce confusion and promote insight to current situation. INTERVENTIONS: Patient required max cues for orientation to date, RL, month, year, and recent holiday despite use of visual calendar. Patient also required max cues for orientation to current town and place. Reviewed orientation information following a 10 minute time delay. Patient continued to require max cues for temporal orientation. Orientation to personal information:  Name: independent  Address: independent  : independent   Age:  Max cues     SHORT TERM GOAL #2:  Goal 2: The patient will complete basic level problem solving, reasoning, and sequencing tasks with 70% accuracy given mod cues to promote improved ADL completion. INTERVENTIONS:  Sequencing basic functional task of brushing teeth in 3 steps (visual stimuli - 3 steps written on manipulative pieces of paper): MAX assist required with very limited engagement and poor direction following     SHORT TERM GOAL #3:  Goal 3: The patient will follow functional 2 step, concrete verbal and written directions with 80% accuracy, no cues, to promote ability to complete ADLs and utilize visual aids in the home environment. INTERVENTIONS: Did not address due to limited session. SHORT TERM GOAL #4:  Goal 4: The patient will demonstrate 80% success on functional responsive/divergent/convergent naming tasks with no more than min cues to promote improved expression of wants/needs/opinions. INTERVENTIONS: Did not address due to limited session. SHORT TERM GOAL #5:  Goal 5: The patient will complete functional visuo-spatial tasks with less than 3 errors per task, given mod cues, to promote improved safety inside and outside the home with ADLs. INTERVENTIONS: Did not address due to limited session.       LONG-TERM GOAL:  Goal 1: The patient will improve cognitive-communication functioning to the level of moderate impairment or above to promote improved safety, awareness/insight, and overall participation within the home and community environments. ONGOING      Assessment: [x]Progressing towards goals []Not Progressing towards goals    Plan for Next Session: orientation and personal info recall tasks, problem solving/reasoning/sequencing tasks, following 1 and 2 step directions, responsive/divergent/convergent naming tasks, visuospatial tasks     Patient Tolerance of Treatment:  []Tolerated well []Tolerated fair []Required rest breaks  [x]Fatigued    Education:  [x]Education was provided []Education not provided due to:  Learner:  [x]Patient [x]Significant Other (wife) []Other  Education provided regarding:  [x]Goals and POC [x]tolerance of recent sessions     []Home Exercise Program:    []Progress and/or discharge information  Method of Education: [x]Discussion []Demonstration []Handout []Other:  Evaluation of Education:  [x]Verbalized understanding []Demonstrates without assistance  [x]Demonstrates with assistance [x]Needs further instruction    [x]No evidence of learning: patient  []No family present      Plan: [x]Continue with current plan of care []Modify current plan of care   []Progress note - frequency and duration: 2x/week for 5 weeks    []Discharge notes - reason for discharge:     [x]Patient continues to require treatment by a licensed therapist to address functional deficits as outlined in the established plan of care. Certification required: [] Yes - See Physician Certification above.       [x] No       Time in: 1050  Time out: 1115  Untimed treatment: 0 minutes  Timed treatment: 25 minutes  Total time: 25 minutes        Carlene Benavidez [address], [address] [facility]

## 2020-11-12 NOTE — PROGRESS NOTES
** PLEASE SIGN, DATE AND TIME CERTIFICATION BELOW AND RETURN TO [facility] (FAX #: [phone]). ATTEST/CO-SIGN IF ACCESSING VIA [employer]. THANK YOU.**    I certify that I have examined the patient below and determined that Physical Medicine and Rehabilitation service is necessary and that I approve the established plan of care for up to 90 days or as specifically noted. Attestation, signature or co-signature of physician indicates approval of certification requirements.    ________________________ ____________ __________  Physician Signature   Date   Time      [address] [facility]  PHYSICAL THERAPY  [] EVALUATION  [] DAILY NOTE (LAND) [] DAILY NOTE (AQUATIC ) [x] PROGRESS NOTE [] DISCHARGE NOTE    [] [address] [facility]   [] [facility] 90    [] [facility]   [[facility]    Date: 2020  Patient Name:  Rajesh Kim  : 1945  MRN: [id number]    Referring Practitioner Jessie Wakefield MD   Diagnosis No admission diagnoses are documented for this encounter. Treatment Diagnosis Difficulty walking    Date of Evaluation 20    Additional Pertinent History HTN; Lewy Body Dementia; Parkinson's       Functional Outcome Measure Used Ariza Balance Scale   Functional Outcome Score 26/56 (20)   25/56      Insurance: Primary: Payor: MEDICARE /  /  / ,   Secondary: HUMANA   Authorization Information: Pre-certification not required   Visit # 7, 7/10 for progress note   Visits Allowed: Based on medical necessity    Recertification Date: 2020   Physician Follow-Up:    Physician Orders:    History of Present Illness:      SUBJECTIVE: Was feeling yucky when therapy started; feeling better now.          TREATMENT   Precautions:    Pain:0/10     X in shaded column indicates activity completed today   Modalities Parameters/  Location  Notes                     Manual Therapy Time/Technique  Notes Exercise/Intervention   Notes   NuStep   x5 min x Seat 9, arms 9, load 5   Bridges  x10  x Max verbal and tactile cuing    In //: heel/toe raises, marches, mini squats x10 ea  x With bilat UE support on airex    3- way hip, hamstring curls x10 ea             rockerboard fwd,     Max VCs and TCs for all activities   Cone  in //       Sit-stands 2x5   x    Tandem stance L/R       Walking fwd, retro with hands held by therapist x2 laps      // bars: lateral walking, tandem walk x2 laps      // bars: harpal step over: forward and lateral  x4 laps       Seated: marching, LAQs, hip add with ball, hip abd with green T band    Max VCs   Seated cross body punches  x10  x    Standing cross body punches on airex  x10  x    Hook lying for trunk rotation with reach  x5  x    Alternating step touch on 6 inch step 10x  x    AGUILERA balance test; review of goals         Specific Interventions Next Treatment: Transfer training/bed mobility; static and dynamic balance and gait activities     Activity/Treatment Tolerance:  [x]  Patient tolerated treatment well  []  Patient limited by fatigue  []  Patient limited by pain   []  Patient limited by medical complications  []  Other:     Assessment: Johana Shell was visibly more fatigued today; he struggled with following verbal and manual cuing and was often observed staring off. He made little to no improvement, however he will continue to benefit from PT to work on balance and safety so he may stay home with his wife as long as possible. Body Structures/Functions/Activity Limitations: impaired activity tolerance, impaired balance, impaired cognition, impaired endurance, impaired safety awareness, impaired strength and abnormal gait  Prognosis: fair    GOALS:  Patient Goal: Minimize fall risk    Short Term Goals:  Time Frame: 3 weeks   1. Johana Shell will demonstrate a good ankle strategy to correct for sway while standing without needing verbal cuing.  NOT MET-needing repeated verbal and tactile cuing   2. Abran's Ariza balance score will improve to 35/56 indicating a lessening fall risk. NOT MET: score went down by 1 point   3. Shahrzad Diaz will be able to transfer from supine to side lying to facilitate greater ease with supine to sit transfers in/out of bed. Long Term Goals:  Time Frame: 6 weeks   1. Discharge with independent HEP  2. Abran's Ariza score will improve to 45/56 indicating mild fall risk. 3. Shahrzad Diaz will perform all bed mobility and transfers independently as his trunk strength and mobility improves. Patient Education:   []  HEP/Education Completed: Plan of Care, Goals, review of [place] [address] [place] [address] Access Code:  []  No new Education completed  []  Reviewed Prior HEP      [x]  Patient verbalized and/or demonstrated understanding of education provided. []  Patient unable to verbalize and/or demonstrate understanding of education provided. Will continue education. [x]  Barriers to learning: cognitive changes making it difficulty to following verbal instruction    PLAN:  Treatment Recommendations: Strengthening, Range of Motion, Balance Training, Functional Mobility Training, Transfer Training, Endurance Training, Gait Training, Neuromuscular Re-education, Manual Therapy - Soft Tissue Mobilization, Manual Therapy - Joint Manipulation, Home Exercise Program, Patient Education and Safety Education and Training    []  Plan of care initiated. Plan to see patient 2 times per week for 6 weeks to address the treatment planned outlined above.   [x]  Continue with current plan of care  []  Modify plan of care as follows:    []  Hold pending physician visit  []  Discharge    Time In 1000   Time Out 1035   Timed Code Minutes: 35 min   Total Treatment Time: 35 min       Electronically Signed by: Geo Leiva

## 2020-11-16 NOTE — PROGRESS NOTES
[address] THERAPY      [x] DAILY NOTE [] PROGRESS NOTE [] DISCHARGE NOTE    []Outpatient [address]   [][place] [address]   [x][facility]               Date: 2020  Patient Name: Madison Basurto      CSN: [id number]   : 1945  (70 y.o.)  Gender: male   Referring Physician: Dr. Jie Zuniga   Diagnosis: Lewy body dementia with behavioral disturbance   Secondary Diagnosis: cognitive deficits, dysphonia, dysarthria, expressive and receptive language impairments  Insurance/Certification Information: Medicare. [employer] is secondary insurance. Visit number / total approved visits: 12 / Unlimited visits based on medical necessity. Visit count since last progress note: 4  Certification Date:   Last scheduled appointment: 12/10/2020  Date of Last MBS:  N/A  Diet: Regular solids. Thin liquids. Subjective: Patient reports he fell earlier this date onto his knee. Patient states his right knee is 'sore' and he also got a cut on his right hand. Patient with improved alertness and participation this date. SHORT TERM GOAL #1:  Goal 1: The patient will demonstrate 70% success on orientation and basic personal information recall tasks given mod cues to use external aids or compensatory strategies to reduce confusion and promote insight to current situation. INTERVENTIONS:   Temporal orientation:  Month: independent (without referral to calendar)  Date: Max cues required   Day of the week: Required referral to calendar + mod cues   Year: required referral to calendar   **Patient able to state, 'I'd look at my calendar' when unable to spontaneously report current year. **Patient spontaneously noticed that SLP got a new clock on the wall of the therapy room, which was not present during last week's session, which indicates intact short term memory of environmental surroundings.       SHORT TERM GOAL #2:  Goal 2: The patient will complete basic level problem solving, reasoning, and sequencing tasks with 70% accuracy given mod cues to promote improved ADL completion. INTERVENTIONS:  Sequencing basic functional task of brushing teeth in 3 steps (visual stimuli - 3 steps written on manipulative pieces of paper - attempted last session but required max assist): 2/3 steps sequenced independently; required min cues to sequence 1/3 steps - significantly improved from last session     Sequencing basic functional task of preparing bowl of cereal (visual stimuli- 4 steps written on manipulative pieces of paper): 2/4 steps sequenced independently; required min cues to sequence 1/4 steps and mod cues to sequence 1/4 steps       SHORT TERM GOAL #3:  Goal 3: The patient will follow functional 2 step, concrete verbal and written directions with 80% accuracy, no cues, to promote ability to complete ADLs and utilize visual aids in the home environment. INTERVENTIONS: 2 step verbal, concrete directions ('first, then' statements): 2/5 with min cues; 3/5 with mod cues     SHORT TERM GOAL #4:  Goal 4: The patient will demonstrate 80% success on functional responsive/divergent/convergent naming tasks with no more than min cues to promote improved expression of wants/needs/opinions. INTERVENTIONS: Divergent naming:   Articles of clothin members produced in 1.5 minutes when provided min-mod cues  Transportation: 7 members produced in 2 minutes when provided min-mod cues     SHORT TERM GOAL #5:  Goal 5: The patient will complete functional visuo-spatial tasks with less than 3 errors per task, given mod cues, to promote improved safety inside and outside the home with ADLs. INTERVENTIONS: Identifying 'what's missing' from a partially filled out check:  Independently identified that signature was missing.   SLP then instructed patient to sign the check to address visuo-spatial skills - patient noted to initially sign on the left side treatment: 25 minutes  Total time: 30 minutes        Karen Benavidez [address], [facility]

## 2020-11-16 NOTE — PROGRESS NOTES
[address] [facility]  PHYSICAL THERAPY  [] EVALUATION  [x] DAILY NOTE (LAND) [] DAILY NOTE (AQUATIC ) [] PROGRESS NOTE [] DISCHARGE NOTE    [] [facility]   [] [address]    [] [address] [facility] [address]   [x] Lucretia Bigger    Date: 2020  Patient Name:  Madison Basurto  : 1945  MRN: [id number]    Referring Practitioner Richie Singh MD   Diagnosis No admission diagnoses are documented for this encounter. Treatment Diagnosis Difficulty walking    Date of Evaluation 20    Additional Pertinent History HTN; Lewy Body Dementia; Parkinson's       Functional Outcome Measure Used Ariza Balance Scale   Functional Outcome Score 26/56 (20)   56      Insurance: Primary: Payor: MEDICARE /  /  / ,   Secondary: HU[employer]   Authorization Information: Pre-certification not required   Visit # 8, 8/10 for progress note   Visits Allowed: Based on medical necessity    Recertification Date: 2020   Physician Follow-Up:    Physician Orders:    History of Present Illness:      SUBJECTIVE: Pt stated he was having a good day today but then tripped in living room but wife stated he didn't hit his head.          TREATMENT   Precautions: GAIT BELT   Pain:0/10     X in shaded column indicates activity completed today   Modalities Parameters/  Location  Notes                     Manual Therapy Time/Technique  Notes                     Exercise/Intervention   Notes   NuStep   x5 min x Seat 9, arms 9, load 5   Bridges  x10  x Max verbal and tactile cuing    In //: heel/toe raises, marches, mini squats x10 ea  x With bilat UE support on airex    3- way hip, hamstring curls x10 ea             rockerboard fwd, lateral x15 taps 20 sec balance x CGA with balance   [facility]  in //       Sit-stands 2x5   x    Tandem stance L/R       Walking fwd, retro with hands held by therapist x2 laps  x    // bars: lateral walking, tandem walk x2 laps  x    // bars: harpal step over: forward and lateral  x4 laps       Seated: marching, LAQs, NT-hip add with ball,NT- hip abd with green T band x10  x Mod VCs, encouraged counting outloud   Seated cross body punches  x10  x    Standing cross body punches on airex  x10 2 sets x    Hook lying for trunk rotation with reach  x5      Alternating step touch on 6 inch step 10x      AGUILERA balance test; review of goals              Sitting with blue therapy ball performing UTR 10x  x    Sit<>standing from mat table with raising therapy ball above head 10x 2 sets x CGA   Sitting EOM bouncing therapy ball to therapist while counting 10x 2 sets x    BIG steps across gym 2 laps  x CGA     Specific Interventions Next Treatment: Transfer training/bed mobility; static and dynamic balance and gait activities     Activity/Treatment Tolerance:  [x]  Patient tolerated treatment well  []  Patient limited by fatigue  []  Patient limited by pain   []  Patient limited by medical complications  []  Other:     Assessment:   Pt was able to participate better in session today but continues to require multiple cues and tactile commands for proper and safe movement. KEEP GAIT BELT on patient history of falls. Body Structures/Functions/Activity Limitations: impaired activity tolerance, impaired balance, impaired cognition, impaired endurance, impaired safety awareness, impaired strength and abnormal gait  Prognosis: fair    GOALS:  Patient Goal: Minimize fall risk    Short Term Goals:  Time Frame: 3 weeks   1. Chino Gonzalez will demonstrate a good ankle strategy to correct for sway while standing without needing verbal cuing. NOT MET-needing repeated verbal and tactile cuing   2. Abran's Aguilera balance score will improve to 35/56 indicating a lessening fall risk. NOT MET: score went down by 1 point   3. Chino Gonzalez will be able to transfer from supine to side lying to facilitate greater ease with supine to sit transfers in/out of bed.      Long Term Goals:  Time Frame: 6 weeks   1. Discharge with independent HEP  2. Abran's Ariza score will improve to 45/56 indicating mild fall risk. 3. Karen Gan will perform all bed mobility and transfers independently as his trunk strength and mobility improves. Patient Education:   []  HEP/Education Completed: Plan of Care, Goals, review of [facility] [address] Access Code:  []  No new Education completed  [x]  Reviewed Prior HEP      [x]  Patient verbalized and/or demonstrated understanding of education provided. []  Patient unable to verbalize and/or demonstrate understanding of education provided. Will continue education. [x]  Barriers to learning: cognitive changes making it difficulty to following verbal instruction    PLAN:  Treatment Recommendations: Strengthening, Range of Motion, Balance Training, Functional Mobility Training, Transfer Training, Endurance Training, Gait Training, Neuromuscular Re-education, Manual Therapy - Soft Tissue Mobilization, Manual Therapy - Joint Manipulation, Home Exercise Program, Patient Education and Safety Education and Training    []  Plan of care initiated. Plan to see patient 2 times per week for 6 weeks to address the treatment planned outlined above.   [x]  Continue with current plan of care  []  Modify plan of care as follows:    []  Hold pending physician visit  []  Discharge    Time In 1100   Time Out 1145   Timed Code Minutes: 45 min   Total Treatment Time: 45 min       Electronically Signed by: Tigre Barber

## 2020-11-18 NOTE — PROGRESS NOTES
[address] THERAPY  [] EVALUATION  [x] DAILY NOTE (LAND) [] DAILY NOTE (AQUATIC ) [] PROGRESS NOTE [] DISCHARGE NOTE    [] [facility]   [] [address]    [] [address] [facility]   [x] Audra Mcmanus    Date: 2020  Patient Name:  Rubén Raymundo  : 1945  MRN: [id number]    Referring Practitioner Darrel Posadas MD   Diagnosis No admission diagnoses are documented for this encounter. Treatment Diagnosis Impaired strength, impaired coordination, decreased balance   Date of Evaluation 20      Functional Outcome Measure Used Functional Impact Questionaire. Functional Outcome Score 49 (20)       Insurance: Primary: Payor: [facility] /  /  / ,   Secondary: HU[employer]   Authorization Information: Unlimited visits based on medical necessity   Visit # 9, 910 for progress note   Visits Allowed: Unlimited visits based on medical necessity   Recertification Date:    Physician Follow-Up: 10/1/2020   Physician Orders: OT eval and treat, increased falls. Pertinent History: Patient and patient's wife reports he has had an increased amount of falls - five times in the last 2 weeks as well as some increased memory issues. Patient's wife reports he had a knot on his head one of the times. No bleed was noted on follow up tests. SUBJECTIVE: Patient's wife reports he is having a better day today. OBJECTIVE:        TREATMENT   Precautions: Fall risk    Pain: No pain at rest.      X in shaded column indicates Activity Completed Today   Modalities Parameters/  Location  Notes/Comments                     Manual Therapy Time/  Technique  Notes/Comments                     Exercises   Sets/  Sec Reps  Notes/Comments   Sitting in chair; Active shoulder flexion and then reach down to toes while holding onto soccer ball  1 12 X Visual demonstration from therapist and cues for full motion.   Patient perseverated slightly and did extra reps. Holding onto soccer ball chest press 1 15 X Visual cues from therapist, repeated directions needed   Horizontal abduction/adduction holding onto soccer ball sitting in chair 1 10 X    Open and close hands 1 15 X    Active elbow flexion/extension in sitting 1 10 X    Sitting dowel aaron for shoulder flexion  1 15 X Brings dowel aaron to slightly over 90 degrees flexion   Sitting dowel aaorn for chest press 1 15 X Sight guidance from therapist to keep dowel aaron shoulder level. Sitting dowel aaron horizontal abduction/adduction 1 10 X With guidance from therapist holding dowel aaron up to near 90 degrees   Wrist and digit extension stretch on tabletop  5-10 seconds 5     Red therabar for bends in supination and pronation and twists 1 each 10     Rolling orange putty with therabar for hand coordination and strenghth       Pulleys for shoulder flexion  1 20     Red (15#) spring loaded gripper B hands 1 15 X    Red digit flex for gripping and digt strength 1 10     Activities Time    Notes/Comments   Patient placed Cran[employer] game pieces in slotted velazco using left and then right hand. 7 minutes           Biodex UBE at 80 RPM. 1 minute forward and 5 minutes backward 6 minutes  X Verbal cue to find left hand placement. Fine motor coordination task of picking up and placing pennies in canister slot. Completed with right and then left hands 10 minutes X Increased time needed. Patient worked on feeding pennies out of hand one at a time. Patient used therabars for \"drumming\" on blue physioball. Cues for pattern. Patient did not follow instructed pattern but did do small B wrist and elbow motions, hitting the physioball. 5 minutes     Patient worked on fine motor coordination task of picking up and placing pegs for [employer]. 4 minutes  Cues for location of piece. Cues for attention. Patient had difficulty with telling if he had a peg in his hand or not.   Stopped activity mobility in clinic during treatment session. 2.  Patient will require no more than 1 repeated instructions for activity during treatment session. PLAN:  Treatment Recommendations: Strengthening, Range of Motion, Home Exercise Program, Safety Education and Training and Coordination    []  Plan of care initiated. Plan to see patient 2 times per week for 8 weeks to address the treatment planned outlined above. [x]  Continue with current plan of care  []  Modify plan of care as follows:    []  Hold pending physician visit  []  Discharge    Time In 1046   Time Out 1124       Timed Code Minutes: Minutes Units   ADL ([id number] [phone])     Aquatics (42651)     Manual Therapy (15405)     Neuro (50302)     Th. Activities (11701) `10 1   Th. Exercise (08483) 28 2   Wheelchair Mgmt ()     Cognitive Function (1st 15 min - 21070)     Cognitive Function (per sub. 15 min - 82189)     Ultrasound (05048)     Ionto (20646)     Manual E-Stim (85512)          TOTAL TREATMENT TIME: 38 minutes       Electronically Signed by:  Dori BULLOCK/DEBORA #[id number]

## 2020-11-19 NOTE — PROGRESS NOTES
[address] THERAPY      [x] DAILY NOTE [] PROGRESS NOTE [] DISCHARGE NOTE    []Outpatient [address]   [][place] [facility]   [x][facility] [place] [facility]               Date: 2020  Patient Name: Silva Patrick      CSN: [id number]   : 1945  (70 y.o.)  Gender: male   Referring Physician: Dr. Shantel Hi   Diagnosis: Lewy body dementia with behavioral disturbance   Secondary Diagnosis: cognitive deficits, dysphonia, dysarthria, expressive and receptive language impairments  Insurance/Certification Information: Medicare. [employer] is secondary insurance. Visit number / total approved visits: 15 / Unlimited visits based on medical necessity. Visit count since last progress note: 5  Certification Date:   Last scheduled appointment: 12/10/2020  Date of Last MBS:  N/A  Diet: Regular solids. Thin liquids. Subjective: Patient cooperative. Patient continues to present with overall improved alertness, orientation, and ability to participate in interventions in comparison to sessions last week. SHORT TERM GOAL #1:  Goal 1: The patient will demonstrate 70% success on orientation and basic personal information recall tasks given mod cues to use external aids or compensatory strategies to reduce confusion and promote insight to current situation. INTERVENTIONS:   Temporal/spatial orientation:  Season: mod cues   Upcoming holiday: independent   Month: independent (without referral to calendar)  Date: Required referral to calendar + mod cues   Day of the week: Required referral to calendar + mod cues   Year:independent (without referral to calendar)   Location: [facility]  Town: [place] [address] #2:  Goal 2: The patient will complete basic level problem solving, reasoning, and sequencing tasks with 70% accuracy given mod cues to promote improved ADL completion.    INTERVENTIONS:  Functional problem solving (basic) of home safety scenarios: 4/5 independent; 1/5 with max cues      Sequencing 4 steps to making a peanut butter and jelly sandwich (visual/written stimulus using manipulatives pieces of paper to sequence): 1/4 steps sequenced independently; required mod cues to sequence 2/4 steps and max cues to sequence 1/4 steps     **Decreased attention to sequencing task noted, requiring frequent redirections. SHORT TERM GOAL #3:  Goal 3: The patient will follow functional 2 step, concrete verbal and written directions with 80% accuracy, no cues, to promote ability to complete ADLs and utilize visual aids in the home environment. INTERVENTIONS:  Did not address due to focus on other goals. SHORT TERM GOAL #4:  Goal 4: The patient will demonstrate 80% success on functional responsive/divergent/convergent naming tasks with no more than min cues to promote improved expression of wants/needs/opinions. INTERVENTIONS: Did not address due to focus on other goals. SHORT TERM GOAL #5:  Goal 5: The patient will complete functional visuo-spatial tasks with less than 3 errors per task, given mod cues, to promote improved safety inside and outside the home with ADLs. INTERVENTIONS: Telling time (analog clock): 4/10 independent; 1/10 with min cues; 2/10 with mod cues; 3/10 with max cues      LONG-TERM GOAL:  Goal 1: The patient will improve cognitive-communication functioning to the level of moderate impairment or above to promote improved safety, awareness/insight, and overall participation within the home and community environments.  ONGOING      Assessment: [x]Progressing towards goals []Not Progressing towards goals    Plan for Next Session: orientation and personal info recall tasks, problem solving/reasoning/sequencing tasks, following 1 and 2 step directions, responsive/divergent/convergent naming tasks, visuospatial tasks     Patient Tolerance of Treatment:  [x]Tolerated well []Tolerated fair []Required rest breaks  []Fatigued    Education:  [x]Education was provided []Education not provided due to:  Learner:  [x]Patient [x]Significant Other (wife) - after session []Other  Education provided regarding:  [x]Goals and POC [x]improved orientation this date     []Home Exercise Program:    []Progress and/or discharge information  Method of Education: [x]Discussion []Demonstration []Handout []Other:  Evaluation of Education:  [x]Verbalized understanding []Demonstrates without assistance  [x]Demonstrates with assistance [x]Needs further instruction    []No evidence of learning:   []No family present      Plan: [x]Continue with current plan of care []Modify current plan of care   []Progress note - frequency and duration: 2x/week for 5 weeks    []Discharge notes - reason for discharge:     [x]Patient continues to require treatment by a licensed therapist to address functional deficits as outlined in the established plan of care. Certification required: [] Yes - See Physician Certification above.       [x] No       Time in: 0948  Time out: 1020  Untimed treatment: 0  Timed treatment: 32 minutes  Total time: 32 minutes        Dara Benavidez [address], [address]

## 2020-11-19 NOTE — PROGRESS NOTES
add with ball, hip abd with green T band x10  x Mod VCs, encouraged counting outloud   Seated cross body punches  x10  x    Standing cross body punches on airex  x10 2 sets x    Hook lying for trunk rotation with reach  x5      Alternating step touch on 6 inch step 10x      AGUILERA balance test; review of goals              Sitting with blue therapy ball performing UTR 10x  x    Sit<>standing from mat table with raising therapy ball above head 10x 2 sets x CGA   Sitting EOM bouncing therapy ball to therapist while counting 10x 2 sets     Standing tapping sticks high& low, left and right x10  x    casting fishing pole x10  x    BIG steps across gym 2 laps  x CGA     Specific Interventions Next Treatment: Transfer training/bed mobility; static and dynamic balance and gait activities     Activity/Treatment Tolerance:  [x]  Patient tolerated treatment well  []  Patient limited by fatigue  []  Patient limited by pain   []  Patient limited by medical complications  []  Other:     Assessment:   Pt was able to participate better in session today but continues to require multiple cues and tactile commands for proper and safe movement. KEEP GAIT BELT on patient history of falls. Body Structures/Functions/Activity Limitations: impaired activity tolerance, impaired balance, impaired cognition, impaired endurance, impaired safety awareness, impaired strength and abnormal gait  Prognosis: fair    GOALS:  Patient Goal: Minimize fall risk    Short Term Goals:  Time Frame: 3 weeks   1. Tanika Aliciatootie will demonstrate a good ankle strategy to correct for sway while standing without needing verbal cuing. NOT MET-needing repeated verbal and tactile cuing   2. Abran's Aguilera balance score will improve to 35/56 indicating a lessening fall risk. NOT MET: score went down by 1 point   3. Tanika Pendleton will be able to transfer from supine to side lying to facilitate greater ease with supine to sit transfers in/out of bed.      Long Term Goals:  Time Frame: 6 weeks   1. Discharge with independent HEP  2. Abran's Ariza score will improve to 45/56 indicating mild fall risk. 3. Jose Ramon Colvin will perform all bed mobility and transfers independently as his trunk strength and mobility improves. Patient Education:   []  HEP/Education Completed: Plan of Care, Goals, review of Agnieszka Potts [address] [place] [address] Access Code:  []  No new Education completed  [x]  Reviewed Prior HEP      [x]  Patient verbalized and/or demonstrated understanding of education provided. []  Patient unable to verbalize and/or demonstrate understanding of education provided. Will continue education. [x]  Barriers to learning: cognitive changes making it difficulty to following verbal instruction    PLAN:  Treatment Recommendations: Strengthening, Range of Motion, Balance Training, Functional Mobility Training, Transfer Training, Endurance Training, Gait Training, Neuromuscular Re-education, Manual Therapy - Soft Tissue Mobilization, Manual Therapy - Joint Manipulation, Home Exercise Program, Patient Education and Safety Education and Training    []  Plan of care initiated. Plan to see patient 2 times per week for 6 weeks to address the treatment planned outlined above.   [x]  Continue with current plan of care  []  Modify plan of care as follows:    []  Hold pending physician visit  []  Discharge    Time In 0855   Time Out 0935   Timed Code Minutes: 40 min   Total Treatment Time: 40 min       Electronically Signed by: Laura Champion

## 2020-11-20 NOTE — PROGRESS NOTES
[address] THERAPY  [] EVALUATION  [] DAILY NOTE (LAND) [] DAILY NOTE (AQUATIC ) [x] PROGRESS NOTE [] DISCHARGE NOTE    [] OUTPATIENT REHABILITATION [facility]   [] [facility]    [] [address] [facility] [address]   [x] Mireille Parra    Date: 2020  Patient Name:  Oh Martinez  : 1945  MRN: [id number]    Referring Practitioner Bryce West MD   Diagnosis No admission diagnoses are documented for this encounter. Treatment Diagnosis Impaired strength, impaired coordination, decreased balance   Date of Evaluation 20      Functional Outcome Measure Used Functional Impact Questionaire. Functional Outcome Score 49 (20)       Insurance: Primary: Payor: Elizabeth Ramos /  /  / ,   Secondary: HU[employer]NA   Authorization Information: Unlimited visits based on medical necessity   Visit # 10, PN completed 2020   Visits Allowed: Unlimited visits based on medical necessity   Recertification Date:    Physician Follow-Up: 10/1/2020   Physician Orders: OT eval and treat, increased falls. Pertinent History: Patient and patient's wife reports he has had an increased amount of falls - five times in the last 2 weeks as well as some increased memory issues. Patient's wife reports he had a knot on his head one of the times. No bleed was noted on follow up tests. SUBJECTIVE: Patient is pleasant and cooperative, nothing new to report today. OBJECTIVE:      TREATMENT   Precautions: Fall risk    Pain: No pain at rest.      X in shaded column indicates Activity Completed Today   Modalities Parameters/  Location  Notes/Comments                     Manual Therapy Time/  Technique  Notes/Comments                     Exercises   Sets/  Sec Reps  Notes/Comments   Sitting edge of mat trunk rotation and reaching overhead for back and forth using bilateral UE's with play ground ball.    X Visual demonstration from therapist and cues for full motion. Patient perseverated slightly and did extra reps. Seated edge of mat across body punching play ground ball held at 90 degrees by therapist with Good strength noted. X Visual cues from therapist, repeated directions needed   \"Drumming\" large blue physioball with dowel rods while seated edge of mat, following patterns that therapist demonstrates - mod cues for correct patterns. X    Open and close hands 1 15     Active elbow flexion/extension in sitting 1 10     Sitting dowel aaron for shoulder flexion  1 15  Brings dowel aaron to slightly over 90 degrees flexion   Sitting dowel aaron for chest press 1 15  Sight guidance from therapist to keep dowel aaron shoulder level. Sitting dowel aaron horizontal abduction/adduction 1 10  With guidance from therapist holding dowel aaron up to near 90 degrees   Wrist and digit extension stretch on tabletop  5-10 seconds 5     Red therabar for bends in supination and pronation and twists 1 each 10     Rolling orange putty with therabar for hand coordination and strenghth       Pulleys for shoulder flexion  1 20     Red (15#) spring loaded gripper B hands 1 15     Red digit flex for gripping and digt strength 1 10     Activities Time    Notes/Comments   Reaching activity while following directions to reach for the correct colored cone with the correct hand, requires increased time to process information. Needs moderate cues throughout to use the correct upper extremity when asked. X    Biodex UBE at 90 RPM x3 minutes forward x3 minutes backward 6 minutes  X Verbal cue to find left hand placement. Fine motor coordination task of picking up and placing pennies in canister slot. Completed with right and then left hands 10 minutes  Increased time needed. Patient worked on feeding pennies out of hand one at a time. Patient used therabars for \"drumming\" on blue physioball. Cues for pattern.  Patient did not follow instructed pattern but did do small B wrist and elbow will complete UE HEP with supervision from his wife for increased ease with dressing. GOAL NOT MET, CONTINUE GOAL  2. Patient will increase right and left hand fine motor coordination for ease with tying shoes as demonstrated by completing 9 hole peg in 79 seconds on the right and 69 seconds on the left. GOAL MET Patient demonstrates completion of 9 hole peg test in 46 seconds with left hand, 52 seconds with right hand. Patient is unable to tie his shoes independently - demonstrates more difficulty with motor planning versus fine motor coordination. REVISE GOAL  Patient will increase right and left hand fine motor coordination for ease with tying shoes as demonstrated by completing 9 hole peg in 45 seconds on the left and 51 seconds on the right  3. Patient will increase right hand  strength to 30# and left hand  to 29# for ease with opening jars. GOAL NOT MET Right  strength 28#, GOAL MET left  strength 30# at progress note with reported ease in gripping. REVISE GOAL Patient will increase right hand  strength to 33# and left hand  to 35# for ease with opening jars. 4.  Patient will increase right shoulder AROM flexion to 105, scaption to 90 and left shoulder flexion to 115, scaption to 90 degrees for increased ease with donning his shirt. GOAL NOT MET Flexion 90, GOAL MET scaption 100 in right shoulder; flexion 115 and scaption 90 in left shoulder. Continued difficulty reaching overhead to shane a shirt. REVISE GOAL Patient will increase right shoulder AROM flexion to 105, scaption to 105 and left shoulder flexion to 120 and scaption to 95 degrees for increased ease with donning his shirt. Long Term Goals:  Time Frame: 4 weeks  1. Patient will require no more than 1 verbal cues for safe mobility in clinic during treatment session. GOAL NOT MET, CONTINUE GOAL  2. Patient will require no more than 1 repeated instructions for activity during treatment session.   GOAL NOT MET, CONTINUE GOAL    PLAN:  Treatment Recommendations: Strengthening, Range of Motion, Home Exercise Program, Safety Education and Training and Coordination    []  Plan of care initiated. Plan to see patient 2 times per week for 8 weeks to address the treatment planned outlined above. []  Continue with current plan of care  [x]  Modify plan of care as follows: Continue per previously established POC until 12/10/2020. []  Hold pending physician visit  []  Discharge    Time In 0900   Time Out 0945       Timed Code Minutes: Minutes Units   ADL (91793)     Aquatics (06331)     Manual Therapy (71300)     Neuro (01240)     Th. Activities (24084) 27 2   Th. Exercise (65202) 15 1   Wheelchair Mgmt (21912)     Cognitive Function (1st 15 min - 23105)     Cognitive Function (per sub. 15 min - 78537)     Ultrasound (48343)     Ionto (56987)     Manual E-Stim (96085)          TOTAL TREATMENT TIME: 45 minutes       Electronically Signed by:  Patricio Pathak OTR/L #[id number]

## 2020-11-23 NOTE — PROGRESS NOTES
[address] THERAPY      [x] DAILY NOTE [] PROGRESS NOTE [] DISCHARGE NOTE    []Outpatient [address]   [][address] [facility]   [x][facility] [employer]               Date: 2020  Patient Name: Anitha La      CSN: [id number]   : 1945  (70 y.o.)  Gender: male   Referring Physician: Dr. Wendi Azar   Diagnosis: Lewy body dementia with behavioral disturbance   Secondary Diagnosis: cognitive deficits, dysphonia, dysarthria, expressive and receptive language impairments  Insurance/Certification Information: Medicare. [employer] is secondary insurance. Visit number / total approved visits: 15 / Unlimited visits based on medical necessity. Visit count since last progress note: 6  Certification Date:   Last scheduled appointment: 12/10/2020  Date of Last MBS:  N/A  Diet: Regular solids. Thin liquids. Subjective: Patient denies pain. Patient cooperative but with poor attention to task and wavering alertness, requiring frequent redirections. Patient stated 'I'm sleepy' and 'I just can't stay awake' midway through session. Wife reports patient did not sleep well last night. SHORT TERM GOAL #1:  Goal 1: The patient will demonstrate 70% success on orientation and basic personal information recall tasks given mod cues to use external aids or compensatory strategies to reduce confusion and promote insight to current situation.    INTERVENTIONS:   Temporal/spatial orientation:  Season: mod cues   Upcoming holiday: independent   Month: independent (without referral to calendar)  Date: Required referral to calendar + mod cues   Day of the week: Required referral to calendar + mod cues   Year: required referral to calendar + mod cues   Location: [facility]  Town: [place] [address] #2:  Goal 2: The patient will complete basic level problem solving, reasoning, and sequencing tasks with 70% accuracy given mod cues to promote improved ADL completion. INTERVENTIONS:  Answering basic questions about a prescription label (visual stimulus): 4/4 with max assist   **Poor attention to task noted, limiting his participation in this intervention. Verbal sequencing of basic ADL (getting dressed in the morning): mod-max assist required throughout     [address] #3:  Goal 3: The patient will follow functional 2 step, concrete verbal and written directions with 80% accuracy, no cues, to promote ability to complete ADLs and utilize visual aids in the home environment. INTERVENTIONS:  Following 2 step, concrete written directions: 1/2 with mod cues; 1/2 with max cues      SHORT TERM GOAL #4:  Goal 4: The patient will demonstrate 80% success on functional responsive/divergent/convergent naming tasks with no more than min cues to promote improved expression of wants/needs/opinions. INTERVENTIONS: Did not address due to focus on other goals. SHORT TERM GOAL #5:  Goal 5: The patient will complete functional visuo-spatial tasks with less than 3 errors per task, given mod cues, to promote improved safety inside and outside the home with ADLs. INTERVENTIONS: Did not address due to focus on other goals. LONG-TERM GOAL:  Goal 1: The patient will improve cognitive-communication functioning to the level of moderate impairment or above to promote improved safety, awareness/insight, and overall participation within the home and community environments.  ONGOING      Assessment: [x]Progressing towards goals []Not Progressing towards goals    Plan for Next Session: orientation and personal info recall tasks, problem solving/reasoning/sequencing tasks, following 1 and 2 step directions, responsive/divergent/convergent naming tasks, visuospatial tasks     Patient Tolerance of Treatment:  []Tolerated well []Tolerated fair []Required rest breaks  [x]Fatigued    Education:  [x]Education was provided []Education not provided due to:  Learner:  [x]Patient [x]Significant Other (wife) - after session []Other  Education provided regarding:  [x]Goals and POC [x] tolerance of session    []Home Exercise Program:    []Progress and/or discharge information  Method of Education: [x]Discussion []Demonstration []Handout []Other:  Evaluation of Education:  [x]Verbalized understanding []Demonstrates without assistance  [x]Demonstrates with assistance [x]Needs further instruction    []No evidence of learning:   []No family present      Plan: [x]Continue with current plan of care []Modify current plan of care   []Progress note - frequency and duration: 2x/week for 5 weeks    []Discharge notes - reason for discharge:     [x]Patient continues to require treatment by a licensed therapist to address functional deficits as outlined in the established plan of care. Certification required: [] Yes - See Physician Certification above.       [x] No       Time in: 09  Time out: 1020  Untimed treatment: 5  Timed treatment: 25 minutes  Total time: 30 minutes        Tsering Benavidez Tereso [address] [facility]

## 2020-11-23 NOTE — PROGRESS NOTES
[facility]  PHYSICAL THERAPY  [] EVALUATION  [x] DAILY NOTE (LAND) [] DAILY NOTE (AQUATIC ) [] PROGRESS NOTE [] DISCHARGE NOTE    [] [facility]   [] Ekaterina     [] [address] [facility]   [x] Freya Alberta    Date: 2020  Patient Name:  Kandy Carreon  : 1945  MRN: [id number]    Referring Practitioner Toro Solano MD   Diagnosis No admission diagnoses are documented for this encounter. Treatment Diagnosis Difficulty walking    Date of Evaluation 20    Additional Pertinent History HTN; Lewy Body Dementia; Parkinson's       Functional Outcome Measure Used Ariza Balance Scale   Functional Outcome Score /56 (20)         Insurance: Primary: Payor: MEDICARE /  /  / ,   Secondary: HUMANA   Authorization Information: Pre-certification not required   Visit # 10, 10/10 for progress note   Visits Allowed: Based on medical necessity    Recertification Date: 2020   Physician Follow-Up:    Physician Orders:    History of Present Illness:      SUBJECTIVE: Pt stated he felt very fatigued today even through he had good sleep last night.           TREATMENT   Precautions: GAIT BELT   Pain:0/10     X in shaded column indicates activity completed today   Modalities Parameters/  Location  Notes                     Manual Therapy Time/Technique  Notes   Ended with manual thoracic stretch over therapy ball 20 sec X3 x                Exercise/Intervention   Notes   NuStep   x5 min x Seat 9, arms 9, load 5   Bridges  x10  x Max verbal and tactile cuing    In //: heel/toe raises, marches, mini squats x10 ea  x With bilat UE support on airex    3- way hip, hamstring curls x10 ea  x           rockerboard fwd, lateral x15 taps 20 sec balance x CGA with balance   Cone  in //       Sit-stands 10x  x CGA, cues to completely sit before standing   Tandem stance L/R       Walking fwd, retro with hands held by therapist x2 laps      // bars: lateral walking, tandem walk x2 laps  x    // bars: harpal step over: forward and lateral  x4 laps       Seated: marching, LAQs, hip add with ball, hip abd with green T band x10  x Mod VCs, encouraged counting outloud   Seated cross body punches  x10  x    Standing cross body punches on airex  x10 2 sets     Hook lying for trunk rotation with reach  x5      Alternating step touch on 6 inch step 10x      AGUILERA balance test; review of goals              Sitting EOM with blue therapy ball performing UTR, shoulder flexion 10x  x Cues for posture   Sit<>standing from mat table with raising therapy ball above head 10x 2 sets  CGA   Sitting EOM bouncing therapy ball to therapist while counting 10x 2 sets x    Sitting EOM tapping ft to ball 20x  x multiple tactile cues    Standing tapping sticks high& low, left and right x10      casting fishing pole x10      BIG steps across gym 2 laps  x CGA     Specific Interventions Next Treatment: Transfer training/bed mobility; static and dynamic balance and gait activities     Activity/Treatment Tolerance:  [x]  Patient tolerated treatment well  []  Patient limited by fatigue  []  Patient limited by pain   []  Patient limited by medical complications  []  Other:     Assessment:   Pt had difficulty with following commands today again. Pt initially started talking and not making sense with word choices. Pt CGA the entire session with multiple sitting RB's due to B knee's giving out and feeling fatigued. Body Structures/Functions/Activity Limitations: impaired activity tolerance, impaired balance, impaired cognition, impaired endurance, impaired safety awareness, impaired strength and abnormal gait  Prognosis: fair    GOALS:  Patient Goal: Minimize fall risk    Short Term Goals:  Time Frame: 3 weeks   1. Shahrzad Diaz will demonstrate a good ankle strategy to correct for sway while standing without needing verbal cuing.  NOT MET-needing repeated verbal and tactile cuing   2. Abran's Ariza balance score will improve to 35/56 indicating a lessening fall risk. NOT MET: score went down by 1 point   3. Chino Gonzalez will be able to transfer from supine to side lying to facilitate greater ease with supine to sit transfers in/out of bed. Long Term Goals:  Time Frame: 6 weeks   1. Discharge with independent HEP  2. Abran's Ariza score will improve to 45/56 indicating mild fall risk. 3. Chino Gonzalez will perform all bed mobility and transfers independently as his trunk strength and mobility improves. Patient Education:   []  HEP/Education Completed: Plan of Care, Goals, review of Galdino Aviles [address] [place] [address] Access Code:  []  No new Education completed  [x]  Reviewed Prior HEP      [x]  Patient verbalized and/or demonstrated understanding of education provided. []  Patient unable to verbalize and/or demonstrate understanding of education provided. Will continue education. [x]  Barriers to learning: cognitive changes making it difficulty to following verbal instruction    PLAN:  Treatment Recommendations: Strengthening, Range of Motion, Balance Training, Functional Mobility Training, Transfer Training, Endurance Training, Gait Training, Neuromuscular Re-education, Manual Therapy - Soft Tissue Mobilization, Manual Therapy - Joint Manipulation, Home Exercise Program, Patient Education and Safety Education and Training    []  Plan of care initiated. Plan to see patient 2 times per week for 6 weeks to address the treatment planned outlined above.   [x]  Continue with current plan of care  []  Modify plan of care as follows:    []  Hold pending physician visit  []  Discharge    Time In 1030   Time Out 1115   Timed Code Minutes: 45 min   Total Treatment Time: 45 min       Electronically Signed by: Blanca Lindsay

## 2020-11-25 NOTE — PROGRESS NOTES
[address]  [] EVALUATION  [x] DAILY NOTE (LAND) [] DAILY NOTE (AQUATIC ) [] PROGRESS NOTE [] DISCHARGE NOTE    [] [facility]   [] [address]    [] [address] [facility] [address] [facility] [address]   [x] Lauro Favors    Date: 2020  Patient Name:  Kelly Child  : 1945  MRN: [id number]    Referring Practitioner Michael Noble MD   Diagnosis No admission diagnoses are documented for this encounter. Treatment Diagnosis Impaired strength, impaired coordination, decreased balance   Date of Evaluation 20      Functional Outcome Measure Used Functional Impact Questionaire. Functional Outcome Score 49 (20)       Insurance: Primary: Payor: Bethany Castaneda /  /  / ,   Secondary: HUMANA   Authorization Information: Unlimited visits based on medical necessity   Visit # 11, PN completed 2020, 1/10 for PN   Visits Allowed: Unlimited visits based on medical necessity   Recertification Date:    Physician Follow-Up: 10/1/2020   Physician Orders: OT eval and treat, increased falls. Pertinent History: Patient and patient's wife reports he has had an increased amount of falls - five times in the last 2 weeks as well as some increased memory issues. Patient's wife reports he had a knot on his head one of the times. No bleed was noted on follow up tests. SUBJECTIVE: Patient pleasant and cooperative.      OBJECTIVE:      TREATMENT   Precautions: Fall risk    Pain: 1/10  Left top of shoulder    X in shaded column indicates Activity Completed Today   Modalities Parameters/  Location  Notes/Comments                     Manual Therapy Time/  Technique  Notes/Comments                     Exercises   Sets/  Sec Reps  Notes/Comments   Sitting edge of mat trunk rotation, reaching side to side while holding soccer ball 1 10 X    Seated edge of mat, patient brought soccer ball overhead and then down to floor  1 10 X \"Drumming\" large blue physioball with dowel rods while seated edge of mat, following patterns that therapist demonstrates - mod cues for correct patterns. Scap retraction  1 10 X    Open and close hands 1 15 X    Active elbow flexion/extension in sitting 1 10     Sitting dowel aaron for shoulder flexion  1 15 X Brings dowel aaron to slightly over 90 degrees flexion. One rest break at 7 reps   Sitting dowel aaron for chest press 1 15 X Slight guidance from therapist to keep dowel aaron shoulder level. Sitting dowel aaron horizontal abduction/adduction 1 10 X With guidance from therapist holding dowel aaron up to near 90 degrees   Wrist and digit extension stretch on tabletop  5-10 seconds 5     Red therabar for bends in supination and pronation and twists 1 each 10     Rolling orange putty with therabar for hand coordination and strenghth       Pulleys for shoulder flexion  1 20     Red (15#) spring loaded gripper B hands 1 15     Red digit flex for gripping and digt strength 1 10     Activities Time    Notes/Comments   Reaching activity while following directions to reach for the correct colored cone with the correct hand, requires increased time to process information. Needs moderate cues throughout to use the correct upper extremity when asked. Biodex UBE at 80 RPM x 3 minutes forward x 3 minutes backward 6 minutes  X Patient recalled when to reverse directions. Patient stood to match bottom 3 rows of cards on board for visual scanning and UE reaching task 12 minutes X Patient reached below waist level to the right for cards. 1 LOB with minimal assist to regain. Patient stood for task. Patient used right UE for reaching and placing task. Patient reached for Poof balls with B hands and place in crates on left or right. 4 minutes X Patient had difficulty with directions of alternating right and left crates and required cues for which hand to use.     Fine motor coordination task of picking up and placing pennies in canister slot. Completed with right and then left hands 10 minutes  Increased time needed. Patient worked on feeding pennies out of hand one at a time. Patient used therabars for \"drumming\" on blue physioball. Cues for pattern. Patient did not follow instructed pattern but did do small B wrist and elbow motions, hitting the physioball. 5 minutes     Patient worked on fine motor coordination task of picking up and placing pegs for [employer]. 4 minutes  Cues for location of piece. Cues for attention. Patient had difficulty with telling if he had a peg in his hand or not. Stopped activity due to very difficult to follow directions and decreased attention span. Patient ambulated in clinic with light CGA  X Minimal cues for going to the location indicated in clinic. Specific Interventions Next Treatment: fine motor coordination, standing endurance/functional mobility, safety, ADLs, UE ROM and UE strengthening    Activity/Treatment Tolerance:  [x]  Patient tolerated treatment well  []  Patient limited by fatigue  []  Patient limited by pain   []  Patient limited by other medical complications  []  Other:     Assessment: Patient is progressing towards his goals. Cues for directions of task. Patient Education:   []  Education Completed: Plan of Care, Goals,    [address] Access Code for HEP:   []  No new Education completed  [x]  Reviewed Prior HEP, cues for exercise techniques and direction walking in clinic. []  Patient verbalized and/or demonstrated understanding of education provided. [x]  Patient unable to verbalize and/or demonstrate understanding of education provided. Will continue education. [x]  Barriers to learning: decreased memory and following directions    GOALS:  Patient Goal: Improve strength and balance. \"No more falls. \"    Short Term Goals:  Time Frame: 4 weeks  1.   Patient will complete UE HEP with supervision from his wife for increased ease with dressing. GOAL NOT MET, CONTINUE GOAL  2. Patient will increase right and left hand fine motor coordination for ease with tying shoes as demonstrated by completing 9 hole peg in 79 seconds on the right and 69 seconds on the left. GOAL MET Patient demonstrates completion of 9 hole peg test in 46 seconds with left hand, 52 seconds with right hand. Patient is unable to tie his shoes independently - demonstrates more difficulty with motor planning versus fine motor coordination. REVISE GOAL  Patient will increase right and left hand fine motor coordination for ease with tying shoes as demonstrated by completing 9 hole peg in 45 seconds on the left and 51 seconds on the right  3. Patient will increase right hand  strength to 30# and left hand  to 29# for ease with opening jars. GOAL NOT MET Right  strength 28#, GOAL MET left  strength 30# at progress note with reported ease in gripping. REVISE GOAL Patient will increase right hand  strength to 33# and left hand  to 35# for ease with opening jars. 4.  Patient will increase right shoulder AROM flexion to 105, scaption to 90 and left shoulder flexion to 115, scaption to 90 degrees for increased ease with donning his shirt. GOAL NOT MET Flexion 90, GOAL MET scaption 100 in right shoulder; flexion 115 and scaption 90 in left shoulder. Continued difficulty reaching overhead to shane a shirt. REVISE GOAL Patient will increase right shoulder AROM flexion to 105, scaption to 105 and left shoulder flexion to 120 and scaption to 95 degrees for increased ease with donning his shirt. Long Term Goals:  Time Frame: 4 weeks  1. Patient will require no more than 1 verbal cues for safe mobility in clinic during treatment session. GOAL NOT MET, CONTINUE GOAL  2. Patient will require no more than 1 repeated instructions for activity during treatment session.   GOAL NOT MET, CONTINUE GOAL    PLAN:  Treatment Recommendations: Strengthening, Range of Motion, Home Exercise Program, Safety Education and Training and Coordination    []  Plan of care initiated. Plan to see patient 2 times per week for 8 weeks to address the treatment planned outlined above. []  Continue with current plan of care  [x]  Modify plan of care as follows: Continue per previously established POC until 12/10/2020. []  Hold pending physician visit  []  Discharge    Time In 1337   Time Out 1416       Timed Code Minutes: Minutes Units   ADL ([facility])     Aquatics (98500)     Manual Therapy (52769)     Neuro (26722)     Th. Activities (66960) 23 2   Th. Exercise (76334) 16 1   Wheelchair Mgmt (13277)     Cognitive Function (1st 15 min - 40365)     Cognitive Function (per sub. 15 min - 88971)     Ultrasound (74429)     Ionto (94529)     Manual E-Stim (38177)          TOTAL TREATMENT TIME: 39 minutes       Electronically Signed by:  Cecille Rodrigues OTVANESSA/DEBORA #[id number]

## 2020-11-30 NOTE — PROGRESS NOTES
[address] [facility]  PHYSICAL THERAPY  [] EVALUATION  [x] DAILY NOTE (LAND) [] DAILY NOTE (AQUATIC ) [] PROGRESS NOTE [] DISCHARGE NOTE    [] [facility]   [] Ekaterina     [] [address] [facility]   [x] Tigist Mishra    Date: 2020  Patient Name:  Enma Arce  : 1945  MRN: [id number]    Referring Practitioner Doug Kawasaki, MD   Diagnosis No admission diagnoses are documented for this encounter. Treatment Diagnosis Difficulty walking    Date of Evaluation 20    Additional Pertinent History HTN; Lewy Body Dementia; Parkinson's       Functional Outcome Measure Used Ariza Balance Scale   Functional Outcome Score /56 (20)         Insurance: Primary: Payor: MEDICARE /  /  / ,   Secondary: HUMANA   Authorization Information: Pre-certification not required   Visit # 15, 4/10 for progress note   Visits Allowed: Based on medical necessity    Recertification Date: 2020   Physician Follow-Up:    Physician Orders:    History of Present Illness:      SUBJECTIVE: pt stated he didn't sleep well last night. Pt had a good Thanksgiving.          TREATMENT   Precautions: GAIT BELT   Pain:0/10     X in shaded column indicates activity completed today   Modalities Parameters/  Location  Notes                     Manual Therapy Time/Technique  Notes   Ended with manual thoracic stretch over therapy ball 20 sec X3 x                Exercise/Intervention   Notes   NuStep   x6 min x Seat 9, arms 9, load 5   Bridges  x10  x Max verbal and tactile cuing    In //: heel/toe raises, marches, mini squats x15 ea  x With bilat UE support on airex    3- way hip, hamstring curls x10-15 ea  x           rockerboard fwd, lateral x15 taps 20 sec balance x CGA with balance   Cone  in //       Sit-stands 10x  x CGA, cues to completely sit before standing   Tandem stance L/R       Walking fwd, retro with hands held by therapist x2 laps  x    // bars: lateral walking x2 laps  x    // bars: harpal step over: forward and lateral  x4 laps       Seated: marching, LAQs, hip add with ball, hip abd with green T band x10  x Mod VCs, encouraged counting outloud   Seated cross body punches  x10  x    Standing cross body punches on airex  x10 2 sets     Hook lying for trunk rotation with reach  x5      Alternating step touch on 6 inch step 10x      AGUILERA balance test; review of goals              Sitting EOM with blue therapy ball performing UTR, shoulder flexion 10x  x Cues for posture, cues for eyes on ball   Sit<>standing from mat table with raising therapy ball above head 10x 2 sets  CGA   Sitting EOM bouncing therapy ball to therapist while counting 10x 2 sets x    Sitting EOM: reaching to floor, to ceiling, behind, hands on lap 5x  x    Sitting EOM tapping ft to ball 20x  x multiple tactile cues    Standing tapping sticks high& low, left and right x10      casting fishing pole x10      BIG steps across gym tapping Ziddy sticks to floor 1 laps  x CGA     Specific Interventions Next Treatment: Transfer training/bed mobility; static and dynamic balance and gait activities     Activity/Treatment Tolerance:  [x]  Patient tolerated treatment well  []  Patient limited by fatigue  []  Patient limited by pain   []  Patient limited by medical complications  []  Other:     Assessment:   Pt continues to fatigue quickly and requires CGA with gait for safety. Moderate cues for technique with exercises. Body Structures/Functions/Activity Limitations: impaired activity tolerance, impaired balance, impaired cognition, impaired endurance, impaired safety awareness, impaired strength and abnormal gait  Prognosis: fair    GOALS:  Patient Goal: Minimize fall risk    Short Term Goals:  Time Frame: 3 weeks   1. Igor James will demonstrate a good ankle strategy to correct for sway while standing without needing verbal cuing.  NOT MET-needing repeated verbal and tactile cuing 2. Abran's Ariza balance score will improve to 35/56 indicating a lessening fall risk. NOT MET: score went down by 1 point   3. Jeannie Aguirre will be able to transfer from supine to side lying to facilitate greater ease with supine to sit transfers in/out of bed. Long Term Goals:  Time Frame: 6 weeks   1. Discharge with independent HEP  2. Abran's Ariza score will improve to 45/56 indicating mild fall risk. 3. Jeannie Aguirre will perform all bed mobility and transfers independently as his trunk strength and mobility improves. Patient Education:   []  HEP/Education Completed: Plan of Care, Goals, review of Kenneth Stewart Serjio Access Code:  []  No new Education completed  [x]  Reviewed Prior HEP      [x]  Patient verbalized and/or demonstrated understanding of education provided. []  Patient unable to verbalize and/or demonstrate understanding of education provided. Will continue education. [x]  Barriers to learning: cognitive changes making it difficulty to following verbal instruction    PLAN:  Treatment Recommendations: Strengthening, Range of Motion, Balance Training, Functional Mobility Training, Transfer Training, Endurance Training, Gait Training, Neuromuscular Re-education, Manual Therapy - Soft Tissue Mobilization, Manual Therapy - Joint Manipulation, Home Exercise Program, Patient Education and Safety Education and Training    []  Plan of care initiated. Plan to see patient 2 times per week for 6 weeks to address the treatment planned outlined above.   [x]  Continue with current plan of care  []  Modify plan of care as follows:    []  Hold pending physician visit  []  Discharge    Time In 1025   Time Out 1105   Timed Code Minutes: 40 min   Total Treatment Time: 40 min       Electronically Signed by: Linda To

## 2020-11-30 NOTE — PROGRESS NOTES
[address] [facility]ERAPY      [x] DAILY NOTE [] PROGRESS NOTE [] DISCHARGE NOTE    []Outpatient [address]   [][address] [facility]   [x][employer]               Date: 2020  Patient Name: Benny Melton      CSN: [id number]   : 1945  (70 y.o.)  Gender: male   Referring Physician: Dr. Hilda Francois   Diagnosis: Lewy body dementia with behavioral disturbance   Secondary Diagnosis: cognitive deficits, dysphonia, dysarthria, expressive and receptive language impairments  Insurance/Certification Information: Medicare. [employer] is secondary insurance. Visit number / total approved visits: 13 / Unlimited visits based on medical necessity. Visit count since last progress note: 7  Certification Date:   Last scheduled appointment: 12/10/2020  Date of Last MBS:  N/A  Diet: Regular solids. Thin liquids. Subjective: Patient pleasant and cooperative but reports he did not sleep well last night. Patient denies pain but states he does have some lower back pain when he first wakes up in the morning. Wife provided patient his medication midway through session. Decreased attention posed as a barrier to successful completion of tasks this session. SHORT TERM GOAL #1:  Goal 1: The patient will demonstrate 70% success on orientation and basic personal information recall tasks given mod cues to use external aids or compensatory strategies to reduce confusion and promote insight to current situation.    INTERVENTIONS:   Temporal/spatial orientation:  Recent holiday: mod cues   Month: required referral to calendar + mod cues to identify   Date: Required referral to calendar + max cues to identify   Day of the week: Required referral to calendar + mod cues to identify   Year: independent   Current weather: max cues    Location: max [facility]   Town: [place] [address] #2:  Goal 2: The patient will complete basic level towards goals []Not Progressing towards goals    Plan for Next Session: orientation and personal info recall tasks, problem solving/reasoning/sequencing tasks, following 1 and 2 step directions, responsive/divergent/convergent naming tasks, visuospatial tasks     Patient Tolerance of Treatment:  []Tolerated well [x]Tolerated fair []Required rest breaks  []Fatigued    Education:  [x]Education was provided []Education not provided due to:  Learner:  [x]Patient [x]Significant Other (wife) - after session []Other  Education provided regarding:  [x]Goals and POC [] tolerance of session    [x]Home Exercise Program: counting change as a functional task    []Progress and/or discharge information  Method of Education: [x]Discussion []Demonstration []Handout []Other:  Evaluation of Education:  [x]Verbalized understanding []Demonstrates without assistance  [x]Demonstrates with assistance [x]Needs further instruction    []No evidence of learning:   []No family present      Plan: [x]Continue with current plan of care []Modify current plan of care   []Progress note - frequency and duration: 2x/week for 5 weeks    []Discharge notes - reason for discharge:     [x]Patient continues to require treatment by a licensed therapist to address functional deficits as outlined in the established plan of care. Certification required: [] Yes - See Physician Certification above.       [x] No       Time in: 1145  Time out: 1215  Untimed treatment: 0  Timed treatment: 30 minutes  Total time: 30 minutes        Chay Benavidez [address], [address]

## 2020-11-30 NOTE — PROGRESS NOTES
down to floor  1 10     \"Drumming\" large blue physioball with dowel rods while seated edge of mat, following patterns that therapist demonstrates - mod cues for correct patterns. Scap retraction  1 10     Open and close hands 1 15     Active elbow flexion/extension in sitting 1 10     Sitting dowel aaron for shoulder flexion  1 15  Brings dowel aaron to slightly over 90 degrees flexion. One rest break at 7 reps   Sitting dowel aaron for chest press 1 15  Slight guidance from therapist to keep dowel aaron shoulder level. Sitting dowel aaron horizontal abduction/adduction 1 10  With guidance from therapist holding dowel aaron up to near 90 degrees   Wrist and digit extension stretch on tabletop  5-10 seconds 5     Red therabar for bends in supination and pronation and twists 1 each 10     Rolling orange putty with therabar for hand coordination and strenghth       Pulleys for shoulder flexion  1 20 X    Red flex bar bends in pronation, supination, twists 1 10 each X    Red digit flex for gripping and digt strength 1 10     Activities Time    Notes/Comments   Reaching activity with clothespins using right and left hand red, green, and blue. X Mod-max cues to stay on task. Biodex UBE at 90 RPM x 3 minutes forward x 3 minutes backward 6 minutes  X    Fine motor coordination removing nuts from screws holding with left hand and unscrewing with right hand. X    Patient reached for Poof balls with B hands and place in crates on left or right. 4 minutes  Patient had difficulty with directions of alternating right and left crates and required cues for which hand to use. Fine motor coordination task of picking up and placing pennies in canister slot. Completed with right and then left hands 10 minutes  Increased time needed. Patient worked on feeding pennies out of hand one at a time. Patient used therabars for \"drumming\" on blue physioball. Cues for pattern.  Patient did not follow instructed pattern but did do small B wrist and elbow motions, hitting the physioball. 5 minutes     Patient worked on fine motor coordination task of picking up and placing pegs for [employer]. 4 minutes  Cues for location of piece. Cues for attention. Patient had difficulty with telling if he had a peg in his hand or not. Stopped activity due to very difficult to follow directions and decreased attention span. Patient ambulated in clinic with light CGA  X Minimal cues to avoid objects in the clinic. Specific Interventions Next Treatment: fine motor coordination, standing endurance/functional mobility, safety, ADLs, UE ROM and UE strengthening    Activity/Treatment Tolerance:  [x]  Patient tolerated treatment well  []  Patient limited by fatigue  []  Patient limited by pain   []  Patient limited by other medical complications  []  Other:     Assessment: Patient is progressing towards his goals. Cues for directions of task. Patient Education:   []  Education Completed: Plan of Care, Goals,    Live Keith Access Code for HEP:   []  No new Education completed  [x]  Reviewed Prior HEP, cues for exercise techniques and direction walking in clinic. []  Patient verbalized and/or demonstrated understanding of education provided. [x]  Patient unable to verbalize and/or demonstrate understanding of education provided. Will continue education. [x]  Barriers to learning: decreased memory and following directions    GOALS:  Patient Goal: Improve strength and balance. \"No more falls. \"    Short Term Goals:  Time Frame: 4 weeks  1. Patient will complete UE HEP with supervision from his wife for increased ease with dressing. GOAL NOT MET, CONTINUE GOAL  2. Patient will increase right and left hand fine motor coordination for ease with tying shoes as demonstrated by completing 9 hole peg in 79 seconds on the right and 69 seconds on the left.  GOAL MET Patient demonstrates completion of 9 hole peg test in 46 seconds with left hand, 52 seconds with right hand. Patient is unable to tie his shoes independently - demonstrates more difficulty with motor planning versus fine motor coordination. REVISE GOAL  Patient will increase right and left hand fine motor coordination for ease with tying shoes as demonstrated by completing 9 hole peg in 45 seconds on the left and 51 seconds on the right  3. Patient will increase right hand  strength to 30# and left hand  to 29# for ease with opening jars. GOAL NOT MET Right  strength 28#, GOAL MET left  strength 30# at progress note with reported ease in gripping. REVISE GOAL Patient will increase right hand  strength to 33# and left hand  to 35# for ease with opening jars. 4.  Patient will increase right shoulder AROM flexion to 105, scaption to 90 and left shoulder flexion to 115, scaption to 90 degrees for increased ease with donning his shirt. GOAL NOT MET Flexion 90, GOAL MET scaption 100 in right shoulder; flexion 115 and scaption 90 in left shoulder. Continued difficulty reaching overhead to shane a shirt. REVISE GOAL Patient will increase right shoulder AROM flexion to 105, scaption to 105 and left shoulder flexion to 120 and scaption to 95 degrees for increased ease with donning his shirt. Long Term Goals:  Time Frame: 4 weeks  1. Patient will require no more than 1 verbal cues for safe mobility in clinic during treatment session. GOAL NOT MET, CONTINUE GOAL  2. Patient will require no more than 1 repeated instructions for activity during treatment session. GOAL NOT MET, CONTINUE GOAL    PLAN:  Treatment Recommendations: Strengthening, Range of Motion, Home Exercise Program, Safety Education and Training and Coordination    []  Plan of care initiated. Plan to see patient 2 times per week for 8 weeks to address the treatment planned outlined above.   [x]  Continue with current plan of care  []  Modify plan of care as follows: Continue per previously established POC until 12/10/2020. []  Hold pending physician visit  []  Discharge    Time In 1115   Time Out 1145       Timed Code Minutes: Minutes Units   ADL (94263)     Aquatics (56207)     Manual Therapy (72836)     Neuro (20902)     Th. Activities (43517)     Th. Exercise (31008) 30 2   Wheelchair Mgmt (39312)     Cognitive Function (1st 15 min - 04426)     Cognitive Function (per sub. 15 min - 41033)     Ultrasound (64558)     Ionto (32681)     Manual E-Stim (00648)          TOTAL TREATMENT TIME: 30 minutes       Electronically Signed by:  Patricio Pathak OTR/L #[id number]

## 2020-12-03 NOTE — PROGRESS NOTES
[address] THERAPY      [x] DAILY NOTE [] PROGRESS NOTE [] DISCHARGE NOTE    [][facility]patient [address]   [][address] [facility]   [x][facility]               Date: 12/3/2020  Patient Name: Rajesh Kim      CSN: [id number]   : 1945  (70 y.o.)  Gender: male   Referring Physician: Dr. Herminio Denver   Diagnosis: Lewy body dementia with behavioral disturbance   Secondary Diagnosis: cognitive deficits, dysphonia, dysarthria, expressive and receptive language impairments  Insurance/Certification Information: Medicare. [employer] is secondary insurance. Visit number / total approved visits: 12 / Unlimited visits based on medical necessity. Visit count since last progress note: 8  Certification Date:   Last scheduled appointment: 12/10/2020  Date of Last MBS:  N/A  Diet: Regular solids. Thin liquids. Subjective: Patient pleasant and cooperative, denies pain. Some confused language noted. Provided feedback to wife following session re: plan of care. Discussed that patient is demonstrating a plateau in function and may be appropriate for discharge next week. Wife in agreement and confirms that she will think about any questions or concerns that she may have for next session. SHORT TERM GOAL #1:  Goal 1: The patient will demonstrate 70% success on orientation and basic personal information recall tasks given mod cues to use external aids or compensatory strategies to reduce confusion and promote insight to current situation.    INTERVENTIONS:   Temporal/spatial orientation:  Month: min cues required   Date: Required multiple choice cue   Day of the week: independent  Year: independent     Personal information:  Age: multiple choice cue required  : independent  Address: mod cues   Wife's name: independent     SHORT TERM GOAL #2:  Goal 2: The patient will complete basic level problem solving, reasoning, and sequencing tasks with 70% accuracy given mod cues to promote improved ADL completion. INTERVENTIONS: Did not address due to focus on other goals. SHORT TERM GOAL #3:  Goal 3: The patient will follow functional 2 step, concrete verbal and written directions with 80% accuracy, no cues, to promote ability to complete ADLs and utilize visual aids in the home environment. INTERVENTIONS: 1 step concrete verbal directions: 1/2 independent; 1/2 with mod cues    2 step concrete verbal directions: 1/3 with min cues; 1/3 with mod cues; 1/3 with max cues      2 step concrete written directions: 2/3 with mod cues; 1/3 with max cues      **Several repetitions and redirections required due to reduced attention to task. SHORT TERM GOAL #4:  Goal 4: The patient will demonstrate 80% success on functional responsive/divergent/convergent naming tasks with no more than min cues to promote improved expression of wants/needs/opinions. INTERVENTIONS: Responsive namin/10 independent; 4/10 with mod cues    Divergent naming:  Fruits:  7 members produced in one minute when provided x1 redirection due to reduced attention   Vegetables: 1 member produced in one minute independently; 1 additional member produced beyond 1 minute parameter; 6 additional members produced when provided mod cues     SHORT TERM GOAL #5:  Goal 5: The patient will complete functional visuo-spatial tasks with less than 3 errors per task, given mod cues, to promote improved safety inside and outside the home with ADLs. INTERVENTIONS: Did not address due to focus on other goals. LONG-TERM GOAL:  Goal 1: The patient will improve cognitive-communication functioning to the level of moderate impairment or above to promote improved safety, awareness/insight, and overall participation within the home and community environments.  ONGOING      Assessment: [x]Progressing towards goals []Not Progressing towards goals    Plan for Next Session: orientation and personal info recall tasks, problem solving/reasoning/sequencing tasks, following 1 and 2 step directions, responsive/divergent/convergent naming tasks, visuospatial tasks     Patient Tolerance of Treatment:  []Tolerated well [x]Tolerated fair []Required rest breaks  []Fatigued    Education:  [x]Education was provided []Education not provided due to:  Learner:  [x]Patient [x]Significant Other (wife) - after session []Other  Education provided regarding:  [x]Goals and POC [] tolerance of session    []Home Exercise Program    [x]Progress and/or discharge information  Method of Education: [x]Discussion []Demonstration []Handout []Other:  Evaluation of Education:  [x]Verbalized understanding []Demonstrates without assistance  [x]Demonstrates with assistance [x]Needs further instruction    []No evidence of learning:   []No family present      Plan: [x]Continue with current plan of care []Modify current plan of care   []Progress note - frequency and duration: 2x/week for 5 weeks    []Discharge notes - reason for discharge:     [x]Patient continues to require treatment by a licensed therapist to address functional deficits as outlined in the established plan of care. Certification required: [] Yes - See Physician Certification above.       [x] No       Time in: 1348  Time out: 1418  Untimed treatment: 30  Timed treatment: 0  Total time: 30 minutes        Ashley Benavidez Tereso [address] [facility]

## 2020-12-03 NOTE — PROGRESS NOTES
[address] [facility]  PHYSICAL THERAPY  [] EVALUATION  [x] DAILY NOTE (LAND) [] DAILY NOTE (AQUATIC ) [] PROGRESS NOTE [] DISCHARGE NOTE    [] [address] [facility]   [] Ekaterina Shepard    [] [address] [facility]   [x] Mallory Pepeter    Date: 12/3/2020  Patient Name:  Trudy Adams  : 1945  MRN: [id number]    Referring Practitioner Mary Kay Persaud MD   Diagnosis No admission diagnoses are documented for this encounter. Treatment Diagnosis Difficulty walking    Date of Evaluation 20    Additional Pertinent History HTN; Lewy Body Dementia; Parkinson's       Functional Outcome Measure Used Ariza Balance Scale   Functional Outcome Score /56 (20)         Insurance: Primary: Payor: MEDICARE /  /  / ,   Secondary: HUMANA   Authorization Information: Pre-certification not required   Visit # 16, 5/10 for progress note   Visits Allowed: Based on medical necessity    Recertification Date: 2020   Physician Follow-Up:    Physician Orders:    History of Present Illness:      SUBJECTIVE: pt stated he didn't sleep well last night. Pt had a good Thanksgiving.          TREATMENT   Precautions: GAIT BELT   Pain:0/10     X in shaded column indicates activity completed today   Modalities Parameters/  Location  Notes                     Manual Therapy Time/Technique  Notes   Ended with manual thoracic stretch over therapy ball 20 sec X3 x                Exercise/Intervention   Notes   NuStep   x6 min x Seat 9, arms 9, load 5   Bridges  x10  x Max verbal and tactile cuing    In //: heel/toe raises, marches, mini squats x15 ea  x With bilat UE support on airex    3- way hip, hamstring curls x10-15 ea  x           rockerboard fwd, lateral x15 taps 20 sec balance x CGA with balance   Cone  in //       Sit-stands 10x  x CGA, cues to completely sit before standing   Tandem stance L/R       Walking fwd, retro with hands held by therapist x2 laps  x    // bars: lateral walking x2 laps  x    // bars: harpal step over: forward and lateral  x4 laps       Seated: marching, LAQs, hip add with ball, hip abd with green T band x10  x Mod VCs, encouraged counting outloud   Seated cross body punches  x10  x    Standing cross body punches on airex  x10 2 sets     Hook lying for trunk rotation with reach  x5      Alternating step touch on 6 inch step 10x      AGUILERA balance test; review of goals              Sitting EOM with blue therapy ball performing UTR, shoulder flexion 10x  x Cues for posture, cues for eyes on ball   Sit<>standing from mat table with raising therapy ball above head 10x 2 sets x CGA   Sitting EOM bouncing therapy ball to therapist while counting 10x 2 sets x    Sitting EOM: reaching to floor, to ceiling, behind, hands on lap 5x  x    Sitting EOM tapping ft to ball 20x  x multiple tactile cues    Standing tapping sticks high& low, left and right x10  x    casting fishing pole x10             Finding and stacking cones    Max VC to complete          BIG steps across gym tapping Ziddy sticks to floor 1 laps   CGA     Specific Interventions Next Treatment: Transfer training/bed mobility; static and dynamic balance and gait activities     Activity/Treatment Tolerance:  [x]  Patient tolerated treatment well  []  Patient limited by fatigue  []  Patient limited by pain   []  Patient limited by medical complications  []  Other:     Assessment:   Pt continues to fatigue quickly and requires CGA with gait for safety. Moderate cues for technique with exercises. Body Structures/Functions/Activity Limitations: impaired activity tolerance, impaired balance, impaired cognition, impaired endurance, impaired safety awareness, impaired strength and abnormal gait  Prognosis: fair    GOALS:  Patient Goal: Minimize fall risk    Short Term Goals:  Time Frame: 3 weeks   1.  Parvin Nelson will demonstrate a good ankle strategy to correct for sway while standing without needing verbal cuing. NOT MET-needing repeated verbal and tactile cuing   2. Abran's Ariza balance score will improve to 35/56 indicating a lessening fall risk. NOT MET: score went down by 1 point   3. Monica Hawk will be able to transfer from supine to side lying to facilitate greater ease with supine to sit transfers in/out of bed. Long Term Goals:  Time Frame: 6 weeks   1. Discharge with independent HEP  2. Jonathans Ariza score will improve to 45/56 indicating mild fall risk. 3. Monica Hawk will perform all bed mobility and transfers independently as his trunk strength and mobility improves. Patient Education:   []  HEP/Education Completed: Plan of Care, Goals, review of Alexei Ace [address] Access Code:  []  No new Education completed  [x]  Reviewed Prior HEP      [x]  Patient verbalized and/or demonstrated understanding of education provided. []  Patient unable to verbalize and/or demonstrate understanding of education provided. Will continue education. [x]  Barriers to learning: cognitive changes making it difficulty to following verbal instruction    PLAN:  Treatment Recommendations: Strengthening, Range of Motion, Balance Training, Functional Mobility Training, Transfer Training, Endurance Training, Gait Training, Neuromuscular Re-education, Manual Therapy - Soft Tissue Mobilization, Manual Therapy - Joint Manipulation, Home Exercise Program, Patient Education and Safety Education and Training    []  Plan of care initiated. Plan to see patient 2 times per week for 6 weeks to address the treatment planned outlined above.   [x]  Continue with current plan of care  []  Modify plan of care as follows:    []  Hold pending physician visit  []  Discharge    Time In 1425   Time Out 1505   Timed Code Minutes: 40 min   Total Treatment Time: 40 min       Electronically Signed by: Vickey Soria [id number]

## 2020-12-03 NOTE — PROGRESS NOTES
[address] THERAPY  [] EVALUATION  [x] DAILY NOTE (LAND) [] DAILY NOTE (AQUATIC ) [] PROGRESS NOTE [] DISCHARGE NOTE    [] [facility]   [] [address]    [] [address] [facility]   [x] Kandi Bosworth    Date: 12/3/2020  Patient Name:  Patel Necessary  : 1945  MRN: [id number]    Referring Practitioner Francesca Gaspar MD   Diagnosis No admission diagnoses are documented for this encounter. Treatment Diagnosis Impaired strength, impaired coordination, decreased balance   Date of Evaluation 20      Functional Outcome Measure Used Functional Impact Questionaire. Functional Outcome Score 49 (20)       Insurance: Primary: Payor: Zeb December /  /  / ,   Secondary: HUMANA   Authorization Information: Unlimited visits based on medical necessity   Visit # 13, PN completed 2020, 3/10 for PN   Visits Allowed: Unlimited visits based on medical necessity   Recertification Date: 700   Physician Follow-Up: 10/1/2020   Physician Orders: OT eval and treat, increased falls. Pertinent History: Patient and patient's wife reports he has had an increased amount of falls - five times in the last 2 weeks as well as some increased memory issues. Patient's wife reports he had a knot on his head one of the times. No bleed was noted on follow up tests. SUBJECTIVE: Patient reports it was hard to get out of bed today because \"everytime I moved, it messed my balance up. \"  Patient reports some dizziness in the morning.      OBJECTIVE:      TREATMENT   Precautions: Fall risk    Pain: 0/10      X in shaded column indicates Activity Completed Today   Modalities Parameters/  Location  Notes/Comments                     Manual Therapy Time/  Technique  Notes/Comments                     Exercises   Sets/  Sec Reps  Notes/Comments   Sitting edge of mat trunk rotation, reaching side to side while holding soccer ball 1 10 Continue with current plan of care  []  Modify plan of care as follows: Continue per previously established POC until 12/10/2020. []  Hold pending physician visit  []  Discharge    Time In 1257   Time Out 1340       Timed Code Minutes: Minutes Units   ADL (28342)     Aquatics (04547)     Manual Therapy (32536)     Neuro (43096)     Th. Activities (78687)     Th. Exercise (36513) 43 3   Wheelchair Mgmt (74857)     Cognitive Function (1st 15 min - 67203)     Cognitive Function (per sub. 15 min - 42625)     Ultrasound (15648)     Ionto (29462)     Manual E-Stim (75420)          TOTAL TREATMENT TIME: 43 minutes       Electronically Signed by:  Lamar Jackson OTVANESSA/DEBORA #[id number]

## 2020-12-07 NOTE — PROGRESS NOTES
[address] [facility]  PHYSICAL THERAPY  [] EVALUATION  [x] DAILY NOTE (LAND) [] DAILY NOTE (AQUATIC ) [] PROGRESS NOTE [] DISCHARGE NOTE    [] [address] [facility]   [] Ekaterina Shepard    [] [address] [facility]   [x] Mallory Pepeter    Date: 2020  Patient Name:  Trudy Adams  : 1945  MRN: [id number]    Referring Practitioner Mary Kay Persaud MD   Diagnosis No admission diagnoses are documented for this encounter.     Treatment Diagnosis Difficulty walking    Date of Evaluation 20    Additional Pertinent History HTN; Lewy Body Dementia; Parkinson's       Functional Outcome Measure Used Ariza Balance Scale   Functional Outcome Score /56 (20)         Insurance: Primary: Payor: MEDICARE /  /  / ,   Secondary: Oriletha Lee Information: Pre-certification not required   Visit # 17, 6/10 for progress note   Visits Allowed: Based on medical necessity    Recertification Date: 2020   Physician Follow-Up:    Physician Orders:    History of Present Illness:      SUBJECTIVE: Had a rough night last night;         TREATMENT   Precautions: GAIT BELT   Pain:0/10     X in shaded column indicates activity completed today   Modalities Parameters/  Location  Notes                     Manual Therapy Time/Technique  Notes   Ended with manual thoracic stretch over therapy ball 20 sec X3 x                Exercise/Intervention   Notes   NuStep   x6 min x Seat 9, arms 9, load 5   Bridges  x10   Max verbal and tactile cuing    In //: heel/toe raises, marches, mini squats x15 ea  x With bilat UE support on airex    3- way hip, hamstring curls x10-15 ea  x           rockerboard fwd, lateral x15 taps 20 sec balance x CGA with balance   [facility]  in //       Sit-stands 10x  x CGA, cues to completely sit before standing   Tandem stance L/R       Walking fwd, retro with hands held by therapist x2 laps  x    // bars: lateral walking x2 laps  x    // bars: harpal step over: forward and lateral  x4 laps       Seated: marching, LAQs, hip add with ball, hip abd with green T band x10  x Mod VCs, encouraged counting outloud   Seated cross body punches  x10  x    Standing cross body punches x10 1 sets x    Hook lying for trunk rotation with reach  x5      Alternating step touch on 6 inch step 10x  x    AGUILERA balance test; review of goals              Sitting EOM with blue therapy ball performing UTR, shoulder flexion 10x  x Cues for posture, cues for eyes on ball   Sit<>standing from mat table with raising therapy ball above head 10x 2 sets x CGA   Sitting EOM bouncing therapy ball to therapist while counting 10x 2 sets x    Sitting EOM: reaching to floor, to ceiling, behind, hands on lap 5x  x    Sitting EOM tapping ft to ball 20x  x multiple tactile cues    Standing tapping sticks high& low, left and right x10  x    casting fishing pole x10  x           Finding and stacking cones   x Max VC to complete          BIG steps across gym tapping Ziddy sticks to floor 1 laps   CGA     Specific Interventions Next Treatment: Transfer training/bed mobility; static and dynamic balance and gait activities     Activity/Treatment Tolerance:  [x]  Patient tolerated treatment well  []  Patient limited by fatigue  []  Patient limited by pain   []  Patient limited by medical complications  []  Other:     Assessment: Continues to require max verbal and tactile cuing to ensure proper form with each exercise/activity. Body Structures/Functions/Activity Limitations: impaired activity tolerance, impaired balance, impaired cognition, impaired endurance, impaired safety awareness, impaired strength and abnormal gait  Prognosis: fair    GOALS:  Patient Goal: Minimize fall risk    Short Term Goals:  Time Frame: 3 weeks   1. Karen Gan will demonstrate a good ankle strategy to correct for sway while standing without needing verbal cuing.  NOT MET-needing repeated verbal and tactile litoing   2. Abran's Ariza balance score will improve to 35/56 indicating a lessening fall risk. NOT MET: score went down by 1 point   3. [facility] will be able to transfer from supine to side lying to facilitate greater ease with supine to sit transfers in/out of bed. Long Term Goals:  Time Frame: 6 weeks   1. Discharge with independent HEP  2. Abran's Ariza score will improve to 45/56 indicating mild fall risk. 3. [facility] will perform all bed mobility and transfers independently as his trunk strength and mobility improves. Patient Education:   []  HEP/Education Completed: Plan of Care, Goals, review of Adebayo Moody Massed Access Code:  []  No new Education completed  [x]  Reviewed Prior HEP      [x]  Patient verbalized and/or demonstrated understanding of education provided. []  Patient unable to verbalize and/or demonstrate understanding of education provided. Will continue education. [x]  Barriers to learning: cognitive changes making it difficulty to following verbal instruction    PLAN:  Treatment Recommendations: Strengthening, Range of Motion, Balance Training, Functional Mobility Training, Transfer Training, Endurance Training, Gait Training, Neuromuscular Re-education, Manual Therapy - Soft Tissue Mobilization, Manual Therapy - Joint Manipulation, Home Exercise Program, Patient Education and Safety Education and Training    []  Plan of care initiated. Plan to see patient 2 times per week for 6 weeks to address the treatment planned outlined above.   [x]  Continue with current plan of care  []  Modify plan of care as follows:    []  Hold pending physician visit  []  Discharge    Time In 1425   Time Out 1505   Timed Code Minutes: 40 min   Total Treatment Time: 40 min       Electronically Signed by: Brett Dutton

## 2020-12-07 NOTE — PROGRESS NOTES
[address] THERAPY  [] EVALUATION  [x] DAILY NOTE (LAND) [] DAILY NOTE (AQUATIC ) [] PROGRESS NOTE [] DISCHARGE NOTE    [] [address] [facility]   [] Ekaterina [address]    [] [address] [facility]   [x] Meyer Flight    Date: 2020  Patient Name:  Aditi Parker  : 1945  MRN: [id number]    Referring Practitioner Dana Newton MD   Diagnosis No admission diagnoses are documented for this encounter. Treatment Diagnosis Impaired strength, impaired coordination, decreased balance   Date of Evaluation 20      Functional Outcome Measure Used Functional Impact Questionaire. Functional Outcome Score 49 (20)       Insurance: Primary: Payor: Luis Denis /  /  / ,   Secondary: [employer]   Authorization Information: Unlimited visits based on medical necessity   Visit # 14,PN completed 2020, 4/10 for PN   Visits Allowed: Unlimited visits based on medical necessity   Recertification Date:    Physician Follow-Up: 10/1/2020   Physician Orders: OT eval and treat, increased falls. Pertinent History: Patient and patient's wife reports he has had an increased amount of falls - five times in the last 2 weeks as well as some increased memory issues. Patient's wife reports he had a knot on his head one of the times. No bleed was noted on follow up tests. SUBJECTIVE: Patient pleasant. Patient required repeated directions for exercises at times.      OBJECTIVE:      TREATMENT   Precautions: Fall risk    Pain: 0/10      X in shaded column indicates Activity Completed Today   Modalities Parameters/  Location  Notes/Comments                     Manual Therapy Time/  Technique  Notes/Comments                     Exercises   Sets/  Sec Reps  Notes/Comments   Sitting edge of mat trunk rotation, reaching side to side while holding soccer ball 1 10 X    Seated edge of mat, patient brought soccer ball overhead and then down to floor  1 10 X    Sitting edge of ball chest press holding soccer ball 1 10 X    \"Drumming\" large blue physioball with red and green therabars while seated edge of mat   X Patient did not follow directions of given pattern well. Required mod/max cues for location of hitting on ball and bigger motions   Scap retraction  1 10     Open and close hands 1 15 X    Active elbow flexion/extension in sitting 1 10 X    Sitting dowel aaron for shoulder flexion  1 10 X Brings dowel aaron to ~ 100 degrees flexion   Sitting dowel aaron for chest press 1 15 X Slight guidance from therapist to keep dowel aaron shoulder level. Sitting dowel aaron horizontal abduction/adduction 1 10 X With guidance from therapist holding dowel aaron up to near 90 degrees   Wrist and digit extension stretch on tabletop  5-10 seconds 5     Red therabar for bends in pronation and twists 1 each 15 X Patient did not follow directions well for bending bar in supinated position   Rolling orange putty with therabar for hand coordination and strenghth       Pulleys for shoulder flexion  1 20            Red digit flex for gripping and digt strength B hands 1 20 X    Activities Time    Notes/Comments   Patient clipped yellow, red and green clothespins with right and left hands on vertical pole for pinch strengthening in sitting and then in standing for reach  X Repeated cues for moving sit to stand. 3 minutes in standing. Repeated cues for taking pins off instead of putting back on. Biodex UBE at 85 RPM x 3 minutes forward x 3 minutes backward 6 minutes  X Cues to change directions   Fine motor coordination task of putting  nuts and bolts together  6 minutes     Patient reached for Poof balls with B hands and place in crates on left or right. 4 minutes  Patient had difficulty with directions of alternating right and left crates and required cues for which hand to use. Fine motor coordination task of picking up and placing pennies in canister slot.   Completed with right and then left hands 10 minutes  Increased time needed. Patient worked on feeding pennies out of hand one at a time. Patient worked on placing Cranium pegs into vertical slots with right and left hand. Patient initially followed red/blue pattern as instructed. 9 minutes X Patient identified by himself if he made a mistake in the pattern. Patient worked on fine motor coordination task of picking up and placing pegs for [employer]. 4 minutes  Cues for location of piece. Cues for attention. Patient had difficulty with telling if he had a peg in his hand or not. Stopped activity due to very difficult to follow directions and decreased attention span. Patient ambulated in clinic with light CGA  X Minimal cues for direction where to go. Specific Interventions Next Treatment: fine motor coordination, standing endurance/functional mobility, safety, ADLs, UE ROM and UE strengthening    Activity/Treatment Tolerance:  [x]  Patient tolerated treatment well  []  Patient limited by fatigue  []  Patient limited by pain   []  Patient limited by other medical complications  []  Other:     Assessment: Patient required repeated directions for task, demonstration for task at times and guidance when walking in clinic. Patient Education:   []  Education Completed: Plan of Care, Goals,    Medbridge Access Code for HEP:   []  No new Education completed  [x]  Reviewed Prior HEP, cues for exercise techniques and direction of tasks. []  Patient verbalized and/or demonstrated understanding of education provided. [x]  Patient unable to verbalize and/or demonstrate understanding of education provided. Will continue education. [x]  Barriers to learning: decreased memory and following directions    GOALS:  Patient Goal: Improve strength and balance. \"No more falls. \"    Short Term Goals:  Time Frame: 4 weeks  1.   Patient will complete UE HEP with supervision from his wife for increased ease with dressing. GOAL NOT MET, CONTINUE GOAL  2. Patient will increase right and left hand fine motor coordination for ease with tying shoes as demonstrated by completing 9 hole peg in 79 seconds on the right and 69 seconds on the left. GOAL MET Patient demonstrates completion of 9 hole peg test in 46 seconds with left hand, 52 seconds with right hand. Patient is unable to tie his shoes independently - demonstrates more difficulty with motor planning versus fine motor coordination. REVISE GOAL  Patient will increase right and left hand fine motor coordination for ease with tying shoes as demonstrated by completing 9 hole peg in 45 seconds on the left and 51 seconds on the right  3. Patient will increase right hand  strength to 30# and left hand  to 29# for ease with opening jars. GOAL NOT MET Right  strength 28#, GOAL MET left  strength 30# at progress note with reported ease in gripping. REVISE GOAL Patient will increase right hand  strength to 33# and left hand  to 35# for ease with opening jars. 4.  Patient will increase right shoulder AROM flexion to 105, scaption to 90 and left shoulder flexion to 115, scaption to 90 degrees for increased ease with donning his shirt. GOAL NOT MET Flexion 90, GOAL MET scaption 100 in right shoulder; flexion 115 and scaption 90 in left shoulder. Continued difficulty reaching overhead to shane a shirt. REVISE GOAL Patient will increase right shoulder AROM flexion to 105, scaption to 105 and left shoulder flexion to 120 and scaption to 95 degrees for increased ease with donning his shirt. Long Term Goals:  Time Frame: 4 weeks  1. Patient will require no more than 1 verbal cues for safe mobility in clinic during treatment session. GOAL NOT MET, CONTINUE GOAL  2. Patient will require no more than 1 repeated instructions for activity during treatment session.   GOAL NOT MET, CONTINUE GOAL    PLAN:  Treatment Recommendations: Strengthening, Range of Motion, Home Exercise Program, Safety Education and Training and Coordination    []  Plan of care initiated. Plan to see patient 2 times per week for 8 weeks to address the treatment planned outlined above. [x]  Continue with current plan of care  []  Modify plan of care as follows: Continue per previously established POC until 12/10/2020. []  Hold pending physician visit  []  Discharge    Time In 1342   Time Out 1424       Timed Code Minutes: Minutes Units   ADL (54278)     Aquatics (72288)     Manual Therapy (38123)     Neuro (49467)     Th. Activities (81560) 10 1   Th. Exercise (07417) 32 2   Wheelchair Mgmt (94308)     Cognitive Function (1st 15 min - 42099)     Cognitive Function (per sub. 15 min - 65770)     Ultrasound (81954)     Ionto (28577)     Manual E-Stim (61662)          TOTAL TREATMENT TIME: 42 minutes       Electronically Signed by:  Russell Suazo OTR/L #[id number]

## 2020-12-07 NOTE — DISCHARGE SUMMARY
[address] THERAPY      [] DAILY NOTE [] PROGRESS NOTE [x] DISCHARGE NOTE    [][facility]patient [address]   [][facility] [address] - Memory: 2     Date: 2020  Patient Name: Aditi Parker      CSN: [id number]   : 1945  (70 y.o.)  Gender: male   Referring Physician: Dr. Jeny Bond   Diagnosis: Lewy body dementia with behavioral disturbance   Secondary Diagnosis: cognitive deficits, dysphonia, dysarthria, expressive and receptive language impairments  Insurance/Certification Information: Medicare. [employer] is secondary insurance. Visit number / total approved visits: 16 / Unlimited visits based on medical necessity. Visit count since last progress note: 9  Certification Date:   Last scheduled appointment: 2020  Date of Last MBS:  N/A  Diet: Regular solids. Thin liquids. Subjective: Patient cooperative, denies pain. Required frequent redirections back to tasks due to impaired attention. Patient also showing signs of hallucinating, as he began to move hands on table as if trying to hold something. Patient stated he saw a 'bug' and states 'I see them every once in awhile'. Discussed with patient and wife plan to discharge from [address] [place] [address] at this time due to overall plateau in progress. Wife demonstrating excellent carryover use of recommended strategies and cueing methods to maximize patient's comprehension and orientation. SHORT TERM GOAL #1:  Goal 1: The patient will demonstrate 70% success on orientation and basic personal information recall tasks given mod cues to use external aids or compensatory strategies to reduce confusion and promote insight to current situation. GOAL NOT MET.    INTERVENTIONS:   Temporal/spatial orientation:  Month: independent   Date: Required multiple choice cue   Day of the week: required multiple choice cue   Year: max cues      SHORT TERM GOAL #2:  Goal 2: The patient will complete basic level problem solving, reasoning, and sequencing tasks with 70% accuracy given mod cues to promote improved ADL completion. GOAL NOT MET. INTERVENTIONS:  Counting pennies (real coins): 1/3 independent; 2/3 with max cues    Adding/Subtracting two amounts (basic math): 1/2 with mod cues; 1/2 with max cues      Reasoning/Safety awareness related to COVID-19 precautions: 2/3 with mod cues; 1/3 with max cues      SHORT TERM GOAL #3:  Goal 3: The patient will follow functional 2 step, concrete verbal and written directions with 80% accuracy, no cues, to promote ability to complete ADLs and utilize visual aids in the home environment. GOAL NOT MET. INTERVENTIONS: Did not address due to focus on other goals. PREVIOUS SESSION: 1 step concrete verbal directions: 1/2 independent; 1/2 with mod cues    2 step concrete verbal directions: 1/3 with min cues; 1/3 with mod cues; 1/3 with max cues      2 step concrete written directions: 2/3 with mod cues; 1/3 with max cues      **Several repetitions and redirections required due to reduced attention to task. SHORT TERM GOAL #4:  Goal 4: The patient will demonstrate 80% success on functional responsive/divergent/convergent naming tasks with no more than min cues to promote improved expression of wants/needs/opinions. GOAL NOT MET. INTERVENTIONS: Did not address due to focus on other goals. PREVIOUS SESSION: Responsive namin/10 independent; 4/10 with mod cues    Divergent naming:  Fruits:  7 members produced in one minute when provided x1 redirection due to reduced attention   Vegetables: 1 member produced in one minute independently; 1 additional member produced beyond 1 minute parameter; 6 additional members produced when provided mod cues     SHORT TERM GOAL #5:  Goal 5:  The patient will complete functional visuo-spatial tasks with less than 3 errors per task, given mod cues, to promote improved safety inside and outside the home with ADLs. GOAL NOT MET. INTERVENTIONS:  Patient completed basic visuo-spatial executive functioning task in which patient was tasked to sign his name and provide the date on the bottom of a stimulus medical form. Patient required mod cues to identify the 'patient signature line' and to sign his name on top of the provided line. Patient independently recalled today's date is 'December 7th' after reviewing orientation information at onset of session, but required max assist to write the date in **/**/** format on the provided line. LONG-TERM GOAL:  Goal 1: The patient will improve cognitive-communication functioning to the level of moderate impairment or above to promote improved safety, awareness/insight, and overall participation within the home and community environments. GOAL NOT MET. Assessment: [x]Progressing towards goals []Not Progressing towards goals    The patient has not met any short term or long term goals since last progress note completion, demonstrating a plateau in function. The patient continues to present with severe cognitive-communication impairments, affecting his ability to complete ADLs in the home setting and participate in social and community contexts. Decreased sustained/selective attention, reduced comprehension, and wavering levels of alertness all pose as therapeutic barriers to successful completion of tasks. Patient requires several redirections, repetitions, and re-phrasing of simple instructions to engage in interventions. The patient continues to be disoriented to most temporal concepts, but did demonstrate spontaneous orientation to the current month and was able to recall today's date following a brief time delay this date. Wife reports noticing 'a little bit' of improvement in cognitive functioning, most notably in ability to recall daily events.   Wife demonstrates excellent support and carryover use of recommended strategies, including use of concrete, basic 1 step commands as patient presents with difficulty following 2 step directions. Recommend continued 24/7 supervision in home setting due to safety concerns related to cognitive deficits. Wife is very supportive and available for constant supervision. Patient Tolerance of Treatment:  []Tolerated well [x]Tolerated fair []Required rest breaks  []Fatigued    Education:  [x]Education was provided []Education not provided due to:  Learner:  [x]Patient [x]Significant Other (wife)  []Other  Education provided regarding:  [x]Goals and POC [] tolerance of session    []Home Exercise Program    [x]Progress and/or discharge information  Method of Education: [x]Discussion [x]Demonstration []Handout []Other:  Evaluation of Education:  [x]Verbalized understanding: wife  []Demonstrates without assistance  []Demonstrates with assistance []Needs further instruction    [x]No evidence of learning: patient   []No family present      Plan: []Continue with current plan of care []Modify current plan of care   []Progress note - frequency and duration:     [x]Discharge notes - reason for discharge: plateau in function      []Patient continues to require treatment by a licensed therapist to address functional deficits as outlined in the established plan of care. Certification required: [] Yes - See Physician Certification above.       [x] No       Time in: 1300  Time out: 1329  Untimed treatment: 0  Timed treatment: 29  Total time: 29 minutes        Vi Benavidez Tereso [address] [facility]

## 2020-12-10 NOTE — DISCHARGE SUMMARY
[address] THERAPY  [] EVALUATION  [] DAILY NOTE (LAND) [] DAILY NOTE (AQUATIC ) [] PROGRESS NOTE [x] DISCHARGE NOTE    [] OUTPATIENT REHABILITATION CENTER [facility]   [] Ekaterina Shepard    [] [address] [facility]   [x] Troy Roof    Date: 12/10/2020  Patient Name:  Delores Garcia  : 1945  MRN: [id number]    Referring Practitioner Jackie Ramirez MD   Diagnosis No admission diagnoses are documented for this encounter. Treatment Diagnosis Impaired strength, impaired coordination, decreased balance   Date of Evaluation 20      Functional Outcome Measure Used Functional Impact Questionaire. Functional Outcome Score 49 (20)       Insurance: Primary: Payor: Luz Le /  /  / ,   Secondary: HUMANA   Authorization Information: Unlimited visits based on medical necessity   Visit # 15 ,PN completed 2020, 5/10 for PN   Visits Allowed: Unlimited visits based on medical necessity   Recertification Date:    Physician Follow-Up: 10/1/2020   Physician Orders: OT eval and treat, increased falls. Pertinent History: Patient and patient's wife reports he has had an increased amount of falls - five times in the last 2 weeks as well as some increased memory issues. Patient's wife reports he had a knot on his head one of the times. No bleed was noted on follow up tests. SUBJECTIVE: Patient is pleasant and cooperative, nothing new to report today.      OBJECTIVE:      TREATMENT   Precautions: Fall risk    Pain: 0/10      X in shaded column indicates Activity Completed Today   Modalities Parameters/  Location  Notes/Comments                     Manual Therapy Time/  Technique  Notes/Comments                     Exercises   Sets/  Sec Reps  Notes/Comments   AROM bilateral shoulders for measurements today   X    Red flex bar bends in pronation, supination, twists  1 10 each X    Sitting edge of ball chest press holding soccer ball 1 10     \"Drumming\" large blue physioball with red and green therabars while seated edge of mat    Patient did not follow directions of given pattern well. Required mod/max cues for location of hitting on ball and bigger motions   Scap retraction  1 10     Open and close hands 1 15     Active elbow flexion/extension in sitting 1 10     Sitting dowel aaron for shoulder flexion  1 10  Brings dowel aaron to ~ 100 degrees flexion   Sitting dowel aaron for chest press 1 15  Slight guidance from therapist to keep dowel aaron shoulder level. Sitting dowel aaron horizontal abduction/adduction 1 10  With guidance from therapist holding dowel aaron up to near 90 degrees   Wrist and digit extension stretch on tabletop  5-10 seconds 5     Rolling orange putty with therabar for hand coordination and strenghth       Pulleys for shoulder flexion  1 20     Red digit flex for gripping and digit strength B hands 1 20 each X    Activities Time    Notes/Comments   Patient clipped yellow, red and green clothespins with right and left hands on vertical pole for pinch strengthening in sitting and then in standing for reach   Repeated cues for moving sit to stand. 3 minutes in standing. Repeated cues for taking pins off instead of putting back on. Biodex UBE at 85 RPM x 3 minutes forward x 3 minutes backward 6 minutes  X Cues to change directions   Fine motor coordination task of putting  nuts and bolts together  6 minutes     Patient reached for Poof balls with B hands and place in crates on left or right. 4 minutes  Patient had difficulty with directions of alternating right and left crates and required cues for which hand to use. Fine motor coordination task of picking up and placing pennies in canister slot. Completed with right and then left hands 10 minutes  Increased time needed. Patient worked on feeding pennies out of hand one at a time. Patient worked on placing Cranium pegs into vertical slots with right and left hand. Patient initially followed red/blue pattern as instructed. 9 minutes  Patient identified by himself if he made a mistake in the pattern. Patient worked on fine motor coordination task of picking up and placing pegs for [employer]. 4 minutes  Cues for location of piece. Cues for attention. Patient had difficulty with telling if he had a peg in his hand or not. Stopped activity due to very difficult to follow directions and decreased attention span. Patient ambulated in clinic with light CGA   Minimal cues for direction where to go. Specific Interventions Next Treatment: fine motor coordination, standing endurance/functional mobility, safety, ADLs, UE ROM and UE strengthening    Activity/Treatment Tolerance:  [x]  Patient tolerated treatment well  []  Patient limited by fatigue  []  Patient limited by pain   []  Patient limited by other medical complications  []  Other:     Assessment: Patient has been participating in OT with diagnosis of decreased strength and coordination. Demonstrates improved fine motor coordination with ability to complete 9 hole peg test at a quicker pace. Patient has maintained bilateral shoulder range of motion. Plan to discharge patient at this time due to peak in progress and getting close to baseline. Patient Education:   []  Education Completed: Plan of Care, Goals,    Medbridge Access Code for HEP:   []  No new Education completed  [x]  Reviewed Prior HEP, cues for exercise techniques and direction of tasks. Discussed plan for discharge with patients wife. []  Patient verbalized and/or demonstrated understanding of education provided. [x]  Patient unable to verbalize and/or demonstrate understanding of education provided. Will continue education. [x]  Barriers to learning: decreased memory and following directions    GOALS:  Patient Goal: Improve strength and balance. \"No more falls. \"    Short Term Goals:  Time Frame: 4 weeks  1.   Patient will complete UE HEP with supervision from his wife for increased ease with dressing. GOAL NOT MET Patient requires hands on assistance from his wife for all dressing tasks. Has difficulty with follow through of HEP. DISCHARGE 12/10/2020  2. Patient will increase right and left hand fine motor coordination for ease with tying shoes as demonstrated by completing 9 hole peg in 45 seconds on the left and 51 seconds on the right. GOAL MET 9 hole peg test on right side 43 seconds, left side 49 seconds at discharge. Still requires assistance from wife for shoe tying, mostly due to difficulty with sequencing. DISCHARGE 12/10/2020  3. Patient will increase right hand  strength to 33# and left hand  to 35# for ease with opening jars. GOAL NOT MET Right  31#, left  33# with report he drops things and has trouble opening jars. DISCHARGE 12/10/2020  4. Patient will increase right shoulder AROM flexion to 105, scaption to 105 and left shoulder flexion to 120 and scaption to 95 degrees for increased ease with donning his shirt. GOAL NOT MET Right shoulder Flexion 95, scaption 90 degrees and left shoulder flexion to 113, scaption 110 degrees with report he has difficulty pulling a shirt overhead without assistance. DISCHARGE 12/10/2020  Long Term Goals:  Time Frame: 4 weeks  1. Patient will require no more than 1 verbal cues for safe mobility in clinic during treatment session. GOAL NOT MET Patient requires CGA or close SBA during treatment sessions to avoid obstacles in clinic. DISCHARGE 12/10/2020  2. Patient will require no more than 1 repeated instructions for activity during treatment session. GOAL NOT MET Patient requires mod-max cueing for activity completion in most treatment sessions. DISCHARGE 12/10/2020    PLAN:  Treatment Recommendations: Strengthening, Range of Motion, Home Exercise Program, Safety Education and Training and Coordination    []  Plan of care initiated.   Plan to see patient 2 times per week for 8 weeks to address the treatment planned outlined above. []  Continue with current plan of care  []  Modify plan of care as follows: Continue per previously established POC until 12/10/2020. []  Hold pending physician visit  [x]  Discharge    Time In 1400   Time Out 1430       Timed Code Minutes: Minutes Units   ADL (70889)     Aquatics (16886)     Manual Therapy (14325)     Neuro (81899)     Th. Activities (25481)     Th. Exercise (18856) 30 2   Wheelchair Mgmt (10554)     Cognitive Function (1st 15 min - 14152)     Cognitive Function (per sub. 15 min - 27016)     Ultrasound (34685)     Ionto (87034)     Manual E-Stim (80127)          TOTAL TREATMENT TIME: 30 minutes       Electronically Signed by:  Patricio Pathak OTR/L #[id number]

## 2020-12-10 NOTE — DISCHARGE SUMMARY
[address] [facility]  PHYSICAL THERAPY  [] EVALUATION  [] DAILY NOTE (LAND) [] DAILY NOTE (AQUATIC ) [] PROGRESS NOTE [x] DISCHARGE NOTE    [] OUTPATIENT REHABILITATION [facility]   [] Jacintocaryn     [] [address] [facility]   [x] Aramis Saucedo    Date: 12/10/2020  Patient Name:  Neil Joshua  : 1945  MRN: [id number]    Referring Practitioner Noah Christianson MD   Diagnosis No admission diagnoses are documented for this encounter.     Treatment Diagnosis Difficulty walking    Date of Evaluation 20    Additional Pertinent History HTN; Lewy Body Dementia; Parkinson's       Functional Outcome Measure Used Ariza Balance Scale   Functional Outcome Score 26/56 (20)   25/56  31/56 (12/10/20)      Insurance: Primary: Payor: MEDICARE /  /  / ,   Secondary: HUMANA   Authorization Information: Pre-certification not required   Visit # 19, 8/10 for progress note   Visits Allowed: Based on medical necessity    Recertification Date: 2020   Physician Follow-Up:    Physician Orders:    History of Present Illness:      SUBJECTIVE: Continues to have a tough time sleeping        TREATMENT   Precautions: GAIT BELT   Pain:0/10     X in shaded column indicates activity completed today   Modalities Parameters/  Location  Notes                     Manual Therapy Time/Technique  Notes   Ended with manual thoracic stretch over therapy ball 20 sec X3 x                Exercise/Intervention   Notes   NuStep   x6 min x Seat 9, arms 9, load 5   Bridges  x10   Max verbal and tactile cuing    In //: heel/toe raises, marches, mini squats x15 ea  x With bilat UE support on airex    3- way hip, hamstring curls x10-15 ea             rockerboard fwd, lateral x15 taps 20 sec balance  CGA with balance   Cone  in //       Sit-stands 10x  x CGA, cues to completely sit before standing   Tandem stance L/R       Walking fwd, retro with hands held by therapist x2 laps  x    // bars: lateral walking x2 laps  x    // bars: harpal step over: forward and lateral  x4 laps       Seated: marching, LAQs, hip add with ball, hip abd with green T band x10   Mod VCs, encouraged counting outloud   Seated cross body punches  x10  x    Standing cross body punches x10 1 sets x    Hook lying for trunk rotation with reach  x5      Alternating step touch on 6 inch step 10x  x    AGUILERA balance test; review of goals   x           Sitting EOM with blue therapy ball performing UTR, shoulder flexion 10x   Cues for posture, cues for eyes on ball   Sit<>standing from mat table with raising therapy ball above head 10x 2 sets  CGA   Sitting EOM bouncing therapy ball to therapist while counting 10x 2 sets     Sitting EOM: reaching to floor, to ceiling, behind, hands on lap 5x      Sitting EOM tapping ft to ball 20x   multiple tactile cues    Standing tapping sticks high& low, left and right x10      casting fishing pole x10  x           Finding and stacking cones    Max VC to complete          BIG steps across gym tapping Ziddy sticks to floor 1 laps   CGA     Specific Interventions Next Treatment: Transfer training/bed mobility; static and dynamic balance and gait activities     Activity/Treatment Tolerance:  [x]  Patient tolerated treatment well  []  Patient limited by fatigue  []  Patient limited by pain   []  Patient limited by medical complications  []  Other:     Assessment: Javi Junior is discharged from PT with independent HEP. Although he did struggle with follow commands and motor processing, he did demonstrate significant improvements in balance; Aguilera test improved from 25/56 to 31/56. Body Structures/Functions/Activity Limitations: impaired activity tolerance, impaired balance, impaired cognition, impaired endurance, impaired safety awareness, impaired strength and abnormal gait  Prognosis: fair    GOALS:  Patient Goal: Minimize fall risk    Short Term Goals:  Time Frame: 3 weeks   1.  Javi Junior will demonstrate a good ankle strategy to correct for sway while standing without needing verbal cuing. NOT MET-needing repeated verbal and tactile cuing   2. Abran's Ariza balance score will improve to 35/56 indicating a lessening fall risk. NOT MET: score did improve to 31  3. Carlos Peralta will be able to transfer from supine to side lying to facilitate greater ease with supine to sit transfers in/out of bed. GOAL MET    Long Term Goals:  Time Frame: 6 weeks   1. Discharge with independent HEP  2. Abran's Ariza score will improve to 45/56 indicating mild fall risk. NOT MET: did improve to 31  3. Carlos Peralta will perform all bed mobility and transfers independently as his trunk strength and mobility improves. GOAL MET    Patient Education:   []  HEP/Education Completed: Plan of Care, Goals, review of [address] Access Code:  []  No new Education completed  [x]  Reviewed Prior HEP      [x]  Patient verbalized and/or demonstrated understanding of education provided. []  Patient unable to verbalize and/or demonstrate understanding of education provided. Will continue education. [x]  Barriers to learning: cognitive changes making it difficulty to following verbal instruction    PLAN:  Treatment Recommendations: Strengthening, Range of Motion, Balance Training, Functional Mobility Training, Transfer Training, Endurance Training, Gait Training, Neuromuscular Re-education, Manual Therapy - Soft Tissue Mobilization, Manual Therapy - Joint Manipulation, Home Exercise Program, Patient Education and Safety Education and Training    []  Plan of care initiated. Plan to see patient 2 times per week for 6 weeks to address the treatment planned outlined above.   []  Continue with current plan of care  []  Modify plan of care as follows:    []  Hold pending physician visit  [x]  Discharge    Time In 1430   Time Out 1505   Timed Code Minutes: 35 min   Total Treatment Time: 35 min       Electronically Signed by: Susu Rock

## 2021-01-01 ENCOUNTER — APPOINTMENT (OUTPATIENT)
Dept: CT IMAGING | Age: 76
DRG: 177 | End: 2021-01-01
Payer: MEDICARE

## 2021-01-01 ENCOUNTER — HOSPITAL ENCOUNTER (INPATIENT)
Age: 76
LOS: 2 days | DRG: 177 | End: 2021-03-31
Attending: FAMILY MEDICINE | Admitting: INTERNAL MEDICINE
Payer: MEDICARE

## 2021-01-01 ENCOUNTER — APPOINTMENT (OUTPATIENT)
Dept: GENERAL RADIOLOGY | Age: 76
DRG: 177 | End: 2021-01-01
Payer: MEDICARE

## 2021-01-01 VITALS
BODY MASS INDEX: 27.76 KG/M2 | SYSTOLIC BLOOD PRESSURE: 60 MMHG | HEART RATE: 117 BPM | DIASTOLIC BLOOD PRESSURE: 37 MMHG | TEMPERATURE: 100.1 F | WEIGHT: 166.8 LBS | RESPIRATION RATE: 12 BRPM | OXYGEN SATURATION: 73 %

## 2021-01-01 DIAGNOSIS — J18.9 PNEUMONIA OF RIGHT UPPER LOBE DUE TO INFECTIOUS ORGANISM: ICD-10-CM

## 2021-01-01 DIAGNOSIS — R41.82 ALTERED MENTAL STATUS, UNSPECIFIED ALTERED MENTAL STATUS TYPE: Primary | ICD-10-CM

## 2021-01-01 DIAGNOSIS — R00.1 BRADYCARDIA: ICD-10-CM

## 2021-01-01 DIAGNOSIS — T68.XXXA HYPOTHERMIA, INITIAL ENCOUNTER: ICD-10-CM

## 2021-01-01 LAB
ACETAMINOPHEN LEVEL: < 5 UG/ML (ref 0–20)
AMMONIA: 27 UMOL/L (ref 11–60)
AMORPHOUS: ABNORMAL
AMPHETAMINE+METHAMPHETAMINE URINE SCREEN: NEGATIVE
ANION GAP SERPL CALCULATED.3IONS-SCNC: 10 MEQ/L (ref 8–16)
ANION GAP SERPL CALCULATED.3IONS-SCNC: 7 MEQ/L (ref 8–16)
APTT: 43.3 SECONDS (ref 22–38)
BACTERIA: ABNORMAL
BARBITURATE QUANTITATIVE URINE: NEGATIVE
BASE EXCESS MIXED: 4.9 MMOL/L (ref -2–3)
BASOPHILS # BLD: 0.1 %
BASOPHILS # BLD: 0.2 %
BASOPHILS ABSOLUTE: 0 THOU/MM3 (ref 0–0.1)
BASOPHILS ABSOLUTE: 0 THOU/MM3 (ref 0–0.1)
BENZODIAZEPINE QUANTITATIVE URINE: NEGATIVE
BILIRUBIN URINE: NEGATIVE
BLOOD, URINE: NEGATIVE
BUN BLDV-MCNC: 20 MG/DL (ref 7–22)
BUN BLDV-MCNC: 25 MG/DL (ref 7–22)
CALCIUM SERPL-MCNC: 8.2 MG/DL (ref 8.5–10.5)
CALCIUM SERPL-MCNC: 9.3 MG/DL (ref 8.5–10.5)
CANNABINOID QUANTITATIVE URINE: NEGATIVE
CASTS: ABNORMAL /LPF
CASTS: ABNORMAL /LPF
CHARACTER, URINE: CLEAR
CHLORIDE BLD-SCNC: 107 MEQ/L (ref 98–111)
CHLORIDE BLD-SCNC: 110 MEQ/L (ref 98–111)
CO2: 26 MEQ/L (ref 23–33)
CO2: 32 MEQ/L (ref 23–33)
COCAINE METABOLITE QUANTITATIVE URINE: NEGATIVE
COLLECTED BY:: ABNORMAL
COLOR: YELLOW
CREAT SERPL-MCNC: 0.9 MG/DL (ref 0.4–1.2)
CREAT SERPL-MCNC: 1.5 MG/DL (ref 0.4–1.2)
CRYSTALS: ABNORMAL
DEVICE: ABNORMAL
EKG ATRIAL RATE: 51 BPM
EKG ATRIAL RATE: 60 BPM
EKG P AXIS: 30 DEGREES
EKG P AXIS: 49 DEGREES
EKG P-R INTERVAL: 218 MS
EKG P-R INTERVAL: 230 MS
EKG Q-T INTERVAL: 530 MS
EKG Q-T INTERVAL: 550 MS
EKG QRS DURATION: 110 MS
EKG QRS DURATION: 112 MS
EKG QTC CALCULATION (BAZETT): 506 MS
EKG QTC CALCULATION (BAZETT): 530 MS
EKG R AXIS: -5 DEGREES
EKG R AXIS: 14 DEGREES
EKG T AXIS: 2 DEGREES
EKG T AXIS: 63 DEGREES
EKG VENTRICULAR RATE: 51 BPM
EKG VENTRICULAR RATE: 60 BPM
EOSINOPHIL # BLD: 0 %
EOSINOPHIL # BLD: 0.5 %
EOSINOPHILS ABSOLUTE: 0 THOU/MM3 (ref 0–0.4)
EOSINOPHILS ABSOLUTE: 0 THOU/MM3 (ref 0–0.4)
EPITHELIAL CELLS, UA: ABNORMAL /HPF
ERYTHROCYTE [DISTWIDTH] IN BLOOD BY AUTOMATED COUNT: 15.4 % (ref 11.5–14.5)
ERYTHROCYTE [DISTWIDTH] IN BLOOD BY AUTOMATED COUNT: 16.1 % (ref 11.5–14.5)
ERYTHROCYTE [DISTWIDTH] IN BLOOD BY AUTOMATED COUNT: 54.3 FL (ref 35–45)
ERYTHROCYTE [DISTWIDTH] IN BLOOD BY AUTOMATED COUNT: 57.1 FL (ref 35–45)
ETHYL ALCOHOL, SERUM: < 0.01 %
FIO2, MIXED VENOUS: 2
FOLATE: 6.8 NG/ML (ref 4.8–24.2)
GFR SERPL CREATININE-BSD FRML MDRD: 46 ML/MIN/1.73M2
GFR SERPL CREATININE-BSD FRML MDRD: 82 ML/MIN/1.73M2
GLUCOSE BLD-MCNC: 110 MG/DL (ref 70–108)
GLUCOSE BLD-MCNC: 112 MG/DL (ref 70–108)
GLUCOSE BLD-MCNC: 80 MG/DL (ref 70–108)
GLUCOSE, URINE: NEGATIVE MG/DL
HCO3, MIXED: 30 MMOL/L (ref 23–28)
HCT VFR BLD CALC: 33.3 % (ref 42–52)
HCT VFR BLD CALC: 36.7 % (ref 42–52)
HEMOGLOBIN: 11 GM/DL (ref 14–18)
HEMOGLOBIN: 12.5 GM/DL (ref 14–18)
IMMATURE GRANS (ABS): 0.05 THOU/MM3 (ref 0–0.07)
IMMATURE GRANS (ABS): 0.08 THOU/MM3 (ref 0–0.07)
IMMATURE GRANULOCYTES: 0.5 %
IMMATURE GRANULOCYTES: 0.7 %
INR BLD: 1.03 (ref 0.85–1.13)
KETONES, URINE: ABNORMAL
LACTIC ACID, SEPSIS: 0.9 MMOL/L (ref 0.5–1.9)
LEUKOCYTE ESTERASE, URINE: NEGATIVE
LYMPHOCYTES # BLD: 12.3 %
LYMPHOCYTES # BLD: 7.1 %
LYMPHOCYTES ABSOLUTE: 0.9 THOU/MM3 (ref 1–4.8)
LYMPHOCYTES ABSOLUTE: 1.2 THOU/MM3 (ref 1–4.8)
MAGNESIUM: 1.9 MG/DL (ref 1.6–2.4)
MAGNESIUM: 2 MG/DL (ref 1.6–2.4)
MCH RBC QN AUTO: 32.9 PG (ref 26–33)
MCH RBC QN AUTO: 33.2 PG (ref 26–33)
MCHC RBC AUTO-ENTMCNC: 33 GM/DL (ref 32.2–35.5)
MCHC RBC AUTO-ENTMCNC: 34.1 GM/DL (ref 32.2–35.5)
MCV RBC AUTO: 97.6 FL (ref 80–94)
MCV RBC AUTO: 99.7 FL (ref 80–94)
MISCELLANEOUS LAB TEST RESULT: ABNORMAL
MONOCYTES # BLD: 7.4 %
MONOCYTES # BLD: 9.9 %
MONOCYTES ABSOLUTE: 0.5 THOU/MM3 (ref 0.4–1.3)
MONOCYTES ABSOLUTE: 1.7 THOU/MM3 (ref 0.4–1.3)
MUCUS: ABNORMAL
NITRITE, URINE: NEGATIVE
NUCLEATED RED BLOOD CELLS: 0 /100 WBC
NUCLEATED RED BLOOD CELLS: 0 /100 WBC
O2 SAT, MIXED: 88 %
OPIATES, URINE: NEGATIVE
OSMOLALITY CALCULATION: 293.8 MOSMOL/KG (ref 275–300)
OXYCODONE: NEGATIVE
PCO2, MIXED VENOUS: 43 MMHG (ref 41–51)
PH UA: 7.5 (ref 5–9)
PH, MIXED: 7.45 (ref 7.31–7.41)
PHENCYCLIDINE QUANTITATIVE URINE: NEGATIVE
PHOSPHORUS: 2.7 MG/DL (ref 2.4–4.7)
PLATELET # BLD: 74 THOU/MM3 (ref 130–400)
PLATELET # BLD: 81 THOU/MM3 (ref 130–400)
PLATELET ESTIMATE: ABNORMAL
PLATELET ESTIMATE: ABNORMAL
PMV BLD AUTO: 13 FL (ref 9.4–12.4)
PMV BLD AUTO: 13.5 FL (ref 9.4–12.4)
PO2 MIXED: 53 MMHG (ref 25–40)
POTASSIUM REFLEX MAGNESIUM: 3.5 MEQ/L (ref 3.5–5.2)
POTASSIUM SERPL-SCNC: 3.2 MEQ/L (ref 3.5–5.2)
PROTEIN UA: NEGATIVE MG/DL
RBC # BLD: 3.34 MILL/MM3 (ref 4.7–6.1)
RBC # BLD: 3.76 MILL/MM3 (ref 4.7–6.1)
RBC URINE: ABNORMAL /HPF
RENAL EPITHELIAL, UA: ABNORMAL
SALICYLATE, SERUM: < 0.3 MG/DL (ref 2–10)
SARS-COV-2, NAAT: NOT DETECTED
SCAN OF BLOOD SMEAR: NORMAL
SEG NEUTROPHILS: 79 %
SEG NEUTROPHILS: 82.3 %
SEGMENTED NEUTROPHILS ABSOLUTE COUNT: 14.5 THOU/MM3 (ref 1.8–7.7)
SEGMENTED NEUTROPHILS ABSOLUTE COUNT: 5.8 THOU/MM3 (ref 1.8–7.7)
SODIUM BLD-SCNC: 146 MEQ/L (ref 135–145)
SODIUM BLD-SCNC: 146 MEQ/L (ref 135–145)
SPECIFIC GRAVITY UA: 1.01 (ref 1–1.03)
T4 FREE: 1.11 NG/DL (ref 0.93–1.76)
TSH SERPL DL<=0.05 MIU/L-ACNC: 4.84 UIU/ML (ref 0.4–4.2)
UROBILINOGEN, URINE: 0.2 EU/DL (ref 0–1)
VITAMIN B-12: 572 PG/ML (ref 211–911)
WBC # BLD: 17.6 THOU/MM3 (ref 4.8–10.8)
WBC # BLD: 7.4 THOU/MM3 (ref 4.8–10.8)
WBC UA: ABNORMAL /HPF
YEAST: ABNORMAL

## 2021-01-01 PROCEDURE — 6360000002 HC RX W HCPCS: Performed by: INTERNAL MEDICINE

## 2021-01-01 PROCEDURE — 96361 HYDRATE IV INFUSION ADD-ON: CPT

## 2021-01-01 PROCEDURE — 82607 VITAMIN B-12: CPT

## 2021-01-01 PROCEDURE — 99285 EMERGENCY DEPT VISIT HI MDM: CPT

## 2021-01-01 PROCEDURE — 80048 BASIC METABOLIC PNL TOTAL CA: CPT

## 2021-01-01 PROCEDURE — 87635 SARS-COV-2 COVID-19 AMP PRB: CPT

## 2021-01-01 PROCEDURE — 6360000002 HC RX W HCPCS

## 2021-01-01 PROCEDURE — 36415 COLL VENOUS BLD VENIPUNCTURE: CPT

## 2021-01-01 PROCEDURE — 96375 TX/PRO/DX INJ NEW DRUG ADDON: CPT

## 2021-01-01 PROCEDURE — 6370000000 HC RX 637 (ALT 250 FOR IP): Performed by: FAMILY MEDICINE

## 2021-01-01 PROCEDURE — 6360000002 HC RX W HCPCS: Performed by: STUDENT IN AN ORGANIZED HEALTH CARE EDUCATION/TRAINING PROGRAM

## 2021-01-01 PROCEDURE — 2580000003 HC RX 258: Performed by: INTERNAL MEDICINE

## 2021-01-01 PROCEDURE — 94760 N-INVAS EAR/PLS OXIMETRY 1: CPT

## 2021-01-01 PROCEDURE — 2140000000 HC CCU INTERMEDIATE R&B

## 2021-01-01 PROCEDURE — 99239 HOSP IP/OBS DSCHRG MGMT >30: CPT | Performed by: INTERNAL MEDICINE

## 2021-01-01 PROCEDURE — 93005 ELECTROCARDIOGRAM TRACING: CPT | Performed by: NURSE PRACTITIONER

## 2021-01-01 PROCEDURE — 82140 ASSAY OF AMMONIA: CPT

## 2021-01-01 PROCEDURE — 99223 1ST HOSP IP/OBS HIGH 75: CPT | Performed by: INTERNAL MEDICINE

## 2021-01-01 PROCEDURE — 85025 COMPLETE CBC W/AUTO DIFF WBC: CPT

## 2021-01-01 PROCEDURE — 93010 ELECTROCARDIOGRAM REPORT: CPT | Performed by: INTERNAL MEDICINE

## 2021-01-01 PROCEDURE — 81001 URINALYSIS AUTO W/SCOPE: CPT

## 2021-01-01 PROCEDURE — 6360000002 HC RX W HCPCS: Performed by: NURSE PRACTITIONER

## 2021-01-01 PROCEDURE — 84443 ASSAY THYROID STIM HORMONE: CPT

## 2021-01-01 PROCEDURE — 2500000003 HC RX 250 WO HCPCS: Performed by: INTERNAL MEDICINE

## 2021-01-01 PROCEDURE — 94640 AIRWAY INHALATION TREATMENT: CPT

## 2021-01-01 PROCEDURE — 2580000003 HC RX 258: Performed by: FAMILY MEDICINE

## 2021-01-01 PROCEDURE — 99232 SBSQ HOSP IP/OBS MODERATE 35: CPT | Performed by: INTERNAL MEDICINE

## 2021-01-01 PROCEDURE — 96365 THER/PROPH/DIAG IV INF INIT: CPT

## 2021-01-01 PROCEDURE — 80307 DRUG TEST PRSMV CHEM ANLYZR: CPT

## 2021-01-01 PROCEDURE — 94761 N-INVAS EAR/PLS OXIMETRY MLT: CPT

## 2021-01-01 PROCEDURE — 2700000000 HC OXYGEN THERAPY PER DAY

## 2021-01-01 PROCEDURE — 82948 REAGENT STRIP/BLOOD GLUCOSE: CPT

## 2021-01-01 PROCEDURE — 84100 ASSAY OF PHOSPHORUS: CPT

## 2021-01-01 PROCEDURE — 80143 DRUG ASSAY ACETAMINOPHEN: CPT

## 2021-01-01 PROCEDURE — 31720 CLEARANCE OF AIRWAYS: CPT

## 2021-01-01 PROCEDURE — 70450 CT HEAD/BRAIN W/O DYE: CPT

## 2021-01-01 PROCEDURE — 85610 PROTHROMBIN TIME: CPT

## 2021-01-01 PROCEDURE — 84439 ASSAY OF FREE THYROXINE: CPT

## 2021-01-01 PROCEDURE — 96368 THER/DIAG CONCURRENT INF: CPT

## 2021-01-01 PROCEDURE — 87040 BLOOD CULTURE FOR BACTERIA: CPT

## 2021-01-01 PROCEDURE — 96366 THER/PROPH/DIAG IV INF ADDON: CPT

## 2021-01-01 PROCEDURE — 83735 ASSAY OF MAGNESIUM: CPT

## 2021-01-01 PROCEDURE — 85730 THROMBOPLASTIN TIME PARTIAL: CPT

## 2021-01-01 PROCEDURE — 82077 ASSAY SPEC XCP UR&BREATH IA: CPT

## 2021-01-01 PROCEDURE — 82746 ASSAY OF FOLIC ACID SERUM: CPT

## 2021-01-01 PROCEDURE — 83605 ASSAY OF LACTIC ACID: CPT

## 2021-01-01 PROCEDURE — 6370000000 HC RX 637 (ALT 250 FOR IP): Performed by: INTERNAL MEDICINE

## 2021-01-01 PROCEDURE — 71045 X-RAY EXAM CHEST 1 VIEW: CPT

## 2021-01-01 PROCEDURE — 2580000003 HC RX 258: Performed by: STUDENT IN AN ORGANIZED HEALTH CARE EDUCATION/TRAINING PROGRAM

## 2021-01-01 PROCEDURE — 82803 BLOOD GASES ANY COMBINATION: CPT

## 2021-01-01 PROCEDURE — 80179 DRUG ASSAY SALICYLATE: CPT

## 2021-01-01 PROCEDURE — 2580000003 HC RX 258: Performed by: NURSE PRACTITIONER

## 2021-01-01 RX ORDER — POLYETHYLENE GLYCOL 3350 17 G/17G
17 POWDER, FOR SOLUTION ORAL DAILY PRN
Status: DISCONTINUED | OUTPATIENT
Start: 2021-01-01 | End: 2021-01-01 | Stop reason: HOSPADM

## 2021-01-01 RX ORDER — ENTACAPONE 200 MG/1
200 TABLET ORAL 3 TIMES DAILY
Status: DISCONTINUED | OUTPATIENT
Start: 2021-01-01 | End: 2021-01-01 | Stop reason: HOSPADM

## 2021-01-01 RX ORDER — GLYCOPYRROLATE 1 MG/5 ML
0.2 SYRINGE (ML) INTRAVENOUS
Status: DISCONTINUED | OUTPATIENT
Start: 2021-01-01 | End: 2021-01-01 | Stop reason: HOSPADM

## 2021-01-01 RX ORDER — IPRATROPIUM BROMIDE AND ALBUTEROL SULFATE 2.5; .5 MG/3ML; MG/3ML
1 SOLUTION RESPIRATORY (INHALATION) EVERY 4 HOURS
Status: DISCONTINUED | OUTPATIENT
Start: 2021-01-01 | End: 2021-01-01 | Stop reason: HOSPADM

## 2021-01-01 RX ORDER — SODIUM CHLORIDE 450 MG/100ML
INJECTION, SOLUTION INTRAVENOUS CONTINUOUS
Status: DISCONTINUED | OUTPATIENT
Start: 2021-01-01 | End: 2021-01-01 | Stop reason: HOSPADM

## 2021-01-01 RX ORDER — QUETIAPINE FUMARATE 25 MG/1
25 TABLET, FILM COATED ORAL NIGHTLY
Status: DISCONTINUED | OUTPATIENT
Start: 2021-01-01 | End: 2021-01-01

## 2021-01-01 RX ORDER — SODIUM CHLORIDE 0.9 % (FLUSH) 0.9 %
10 SYRINGE (ML) INJECTION EVERY 12 HOURS SCHEDULED
Status: DISCONTINUED | OUTPATIENT
Start: 2021-01-01 | End: 2021-01-01 | Stop reason: SDUPTHER

## 2021-01-01 RX ORDER — ACETAMINOPHEN 650 MG/1
650 SUPPOSITORY RECTAL EVERY 6 HOURS PRN
Status: DISCONTINUED | OUTPATIENT
Start: 2021-01-01 | End: 2021-01-01 | Stop reason: HOSPADM

## 2021-01-01 RX ORDER — MAGNESIUM SULFATE IN WATER 40 MG/ML
2000 INJECTION, SOLUTION INTRAVENOUS ONCE
Status: COMPLETED | OUTPATIENT
Start: 2021-01-01 | End: 2021-01-01

## 2021-01-01 RX ORDER — MEMANTINE HYDROCHLORIDE 10 MG/1
10 TABLET ORAL 2 TIMES DAILY
COMMUNITY

## 2021-01-01 RX ORDER — PROMETHAZINE HYDROCHLORIDE 25 MG/1
12.5 TABLET ORAL EVERY 6 HOURS PRN
Status: DISCONTINUED | OUTPATIENT
Start: 2021-01-01 | End: 2021-01-01 | Stop reason: HOSPADM

## 2021-01-01 RX ORDER — LEVOFLOXACIN 5 MG/ML
750 INJECTION, SOLUTION INTRAVENOUS ONCE
Status: COMPLETED | OUTPATIENT
Start: 2021-01-01 | End: 2021-01-01

## 2021-01-01 RX ORDER — FLUDROCORTISONE ACETATE 0.1 MG/1
0.1 TABLET ORAL DAILY
Status: DISCONTINUED | OUTPATIENT
Start: 2021-01-01 | End: 2021-01-01 | Stop reason: HOSPADM

## 2021-01-01 RX ORDER — ATROPINE SULFATE 0.1 MG/ML
0.5 INJECTION INTRAVENOUS ONCE
Status: COMPLETED | OUTPATIENT
Start: 2021-01-01 | End: 2021-01-01

## 2021-01-01 RX ORDER — DONEPEZIL HYDROCHLORIDE 10 MG/1
20 TABLET, FILM COATED ORAL DAILY
COMMUNITY

## 2021-01-01 RX ORDER — MAGNESIUM SULFATE IN WATER 40 MG/ML
2000 INJECTION, SOLUTION INTRAVENOUS PRN
Status: DISCONTINUED | OUTPATIENT
Start: 2021-01-01 | End: 2021-01-01 | Stop reason: HOSPADM

## 2021-01-01 RX ORDER — POTASSIUM CHLORIDE 7.45 MG/ML
10 INJECTION INTRAVENOUS ONCE
Status: COMPLETED | OUTPATIENT
Start: 2021-01-01 | End: 2021-01-01

## 2021-01-01 RX ORDER — MORPHINE SULFATE 4 MG/ML
4 INJECTION, SOLUTION INTRAMUSCULAR; INTRAVENOUS
Status: DISCONTINUED | OUTPATIENT
Start: 2021-01-01 | End: 2021-01-01 | Stop reason: HOSPADM

## 2021-01-01 RX ORDER — 0.9 % SODIUM CHLORIDE 0.9 %
500 INTRAVENOUS SOLUTION INTRAVENOUS ONCE
Status: COMPLETED | OUTPATIENT
Start: 2021-01-01 | End: 2021-01-01

## 2021-01-01 RX ORDER — IPRATROPIUM BROMIDE AND ALBUTEROL SULFATE 2.5; .5 MG/3ML; MG/3ML
1 SOLUTION RESPIRATORY (INHALATION) EVERY 4 HOURS PRN
Status: DISCONTINUED | OUTPATIENT
Start: 2021-01-01 | End: 2021-01-01 | Stop reason: HOSPADM

## 2021-01-01 RX ORDER — 0.9 % SODIUM CHLORIDE 0.9 %
1000 INTRAVENOUS SOLUTION INTRAVENOUS ONCE
Status: COMPLETED | OUTPATIENT
Start: 2021-01-01 | End: 2021-01-01

## 2021-01-01 RX ORDER — SODIUM CHLORIDE 9 MG/ML
25 INJECTION, SOLUTION INTRAVENOUS PRN
Status: DISCONTINUED | OUTPATIENT
Start: 2021-01-01 | End: 2021-01-01 | Stop reason: HOSPADM

## 2021-01-01 RX ORDER — LORAZEPAM 2 MG/ML
1 INJECTION INTRAMUSCULAR
Status: DISCONTINUED | OUTPATIENT
Start: 2021-01-01 | End: 2021-01-01 | Stop reason: HOSPADM

## 2021-01-01 RX ORDER — SODIUM CHLORIDE 9 MG/ML
INJECTION, SOLUTION INTRAVENOUS CONTINUOUS
Status: DISCONTINUED | OUTPATIENT
Start: 2021-01-01 | End: 2021-01-01

## 2021-01-01 RX ORDER — ACETAMINOPHEN 325 MG/1
650 TABLET ORAL EVERY 6 HOURS PRN
Status: DISCONTINUED | OUTPATIENT
Start: 2021-01-01 | End: 2021-01-01 | Stop reason: HOSPADM

## 2021-01-01 RX ORDER — ONDANSETRON 2 MG/ML
4 INJECTION INTRAMUSCULAR; INTRAVENOUS EVERY 6 HOURS PRN
Status: DISCONTINUED | OUTPATIENT
Start: 2021-01-01 | End: 2021-01-01 | Stop reason: HOSPADM

## 2021-01-01 RX ORDER — SODIUM CHLORIDE 0.9 % (FLUSH) 0.9 %
10 SYRINGE (ML) INJECTION PRN
Status: DISCONTINUED | OUTPATIENT
Start: 2021-01-01 | End: 2021-01-01 | Stop reason: SDUPTHER

## 2021-01-01 RX ORDER — IPRATROPIUM BROMIDE AND ALBUTEROL SULFATE 2.5; .5 MG/3ML; MG/3ML
1 SOLUTION RESPIRATORY (INHALATION)
Status: DISCONTINUED | OUTPATIENT
Start: 2021-01-01 | End: 2021-01-01

## 2021-01-01 RX ORDER — POTASSIUM CHLORIDE 1500 MG/1
20 TABLET, FILM COATED, EXTENDED RELEASE ORAL
COMMUNITY

## 2021-01-01 RX ORDER — POTASSIUM CHLORIDE 7.45 MG/ML
10 INJECTION INTRAVENOUS PRN
Status: DISCONTINUED | OUTPATIENT
Start: 2021-01-01 | End: 2021-01-01 | Stop reason: HOSPADM

## 2021-01-01 RX ORDER — SODIUM CHLORIDE 0.9 % (FLUSH) 0.9 %
10 SYRINGE (ML) INJECTION PRN
Status: DISCONTINUED | OUTPATIENT
Start: 2021-01-01 | End: 2021-01-01 | Stop reason: HOSPADM

## 2021-01-01 RX ORDER — SODIUM CHLORIDE 0.9 % (FLUSH) 0.9 %
10 SYRINGE (ML) INJECTION EVERY 12 HOURS SCHEDULED
Status: DISCONTINUED | OUTPATIENT
Start: 2021-01-01 | End: 2021-01-01 | Stop reason: HOSPADM

## 2021-01-01 RX ORDER — POTASSIUM CHLORIDE 20 MEQ/1
40 TABLET, EXTENDED RELEASE ORAL PRN
Status: DISCONTINUED | OUTPATIENT
Start: 2021-01-01 | End: 2021-01-01

## 2021-01-01 RX ADMIN — IPRATROPIUM BROMIDE AND ALBUTEROL SULFATE 1 AMPULE: .5; 3 SOLUTION RESPIRATORY (INHALATION) at 00:28

## 2021-01-01 RX ADMIN — MORPHINE SULFATE 4 MG: 4 INJECTION, SOLUTION INTRAMUSCULAR; INTRAVENOUS at 15:06

## 2021-01-01 RX ADMIN — FAMOTIDINE 20 MG: 10 INJECTION, SOLUTION INTRAVENOUS at 20:01

## 2021-01-01 RX ADMIN — PIPERACILLIN AND TAZOBACTAM 3375 MG: 3; .375 INJECTION, POWDER, LYOPHILIZED, FOR SOLUTION INTRAVENOUS at 20:01

## 2021-01-01 RX ADMIN — Medication 0.2 MG: at 13:43

## 2021-01-01 RX ADMIN — PIPERACILLIN AND TAZOBACTAM 3375 MG: 3; .375 INJECTION, POWDER, LYOPHILIZED, FOR SOLUTION INTRAVENOUS at 13:10

## 2021-01-01 RX ADMIN — LORAZEPAM 1 MG: 2 INJECTION INTRAMUSCULAR; INTRAVENOUS at 16:53

## 2021-01-01 RX ADMIN — POTASSIUM CHLORIDE 10 MEQ: 7.46 INJECTION, SOLUTION INTRAVENOUS at 13:00

## 2021-01-01 RX ADMIN — CEFTRIAXONE SODIUM 1000 MG: 1 INJECTION, POWDER, FOR SOLUTION INTRAMUSCULAR; INTRAVENOUS at 08:16

## 2021-01-01 RX ADMIN — FAMOTIDINE 20 MG: 10 INJECTION, SOLUTION INTRAVENOUS at 13:10

## 2021-01-01 RX ADMIN — SODIUM CHLORIDE, PRESERVATIVE FREE 10 ML: 5 INJECTION INTRAVENOUS at 20:35

## 2021-01-01 RX ADMIN — PIPERACILLIN AND TAZOBACTAM 3375 MG: 3; .375 INJECTION, POWDER, LYOPHILIZED, FOR SOLUTION INTRAVENOUS at 05:25

## 2021-01-01 RX ADMIN — SODIUM CHLORIDE: 9 INJECTION, SOLUTION INTRAVENOUS at 12:29

## 2021-01-01 RX ADMIN — ATROPINE SULFATE 0.5 MG: 0.1 INJECTION, SOLUTION ENDOTRACHEAL; INTRAMUSCULAR; INTRAVENOUS; SUBCUTANEOUS at 06:50

## 2021-01-01 RX ADMIN — DOXYCYCLINE 100 MG: 100 INJECTION, POWDER, LYOPHILIZED, FOR SOLUTION INTRAVENOUS at 05:25

## 2021-01-01 RX ADMIN — IPRATROPIUM BROMIDE AND ALBUTEROL SULFATE 1 AMPULE: .5; 3 SOLUTION RESPIRATORY (INHALATION) at 00:25

## 2021-01-01 RX ADMIN — LORAZEPAM 1 MG: 2 INJECTION INTRAMUSCULAR; INTRAVENOUS at 20:08

## 2021-01-01 RX ADMIN — AZITHROMYCIN DIHYDRATE 250 MG: 500 INJECTION, POWDER, LYOPHILIZED, FOR SOLUTION INTRAVENOUS at 12:13

## 2021-01-01 RX ADMIN — FAMOTIDINE 20 MG: 10 INJECTION, SOLUTION INTRAVENOUS at 08:58

## 2021-01-01 RX ADMIN — Medication 0.2 MG: at 20:01

## 2021-01-01 RX ADMIN — IPRATROPIUM BROMIDE AND ALBUTEROL SULFATE 1 AMPULE: .5; 3 SOLUTION RESPIRATORY (INHALATION) at 11:29

## 2021-01-01 RX ADMIN — POTASSIUM CHLORIDE 10 MEQ: 7.46 INJECTION, SOLUTION INTRAVENOUS at 09:54

## 2021-01-01 RX ADMIN — SODIUM CHLORIDE: 4.5 INJECTION, SOLUTION INTRAVENOUS at 09:56

## 2021-01-01 RX ADMIN — DOXYCYCLINE 100 MG: 100 INJECTION, POWDER, LYOPHILIZED, FOR SOLUTION INTRAVENOUS at 16:50

## 2021-01-01 RX ADMIN — SODIUM CHLORIDE 1000 ML: 9 INJECTION, SOLUTION INTRAVENOUS at 20:34

## 2021-01-01 RX ADMIN — FAMOTIDINE 20 MG: 10 INJECTION, SOLUTION INTRAVENOUS at 20:41

## 2021-01-01 RX ADMIN — SODIUM CHLORIDE, PRESERVATIVE FREE 10 ML: 5 INJECTION INTRAVENOUS at 08:45

## 2021-01-01 RX ADMIN — IPRATROPIUM BROMIDE AND ALBUTEROL SULFATE 1 AMPULE: .5; 3 SOLUTION RESPIRATORY (INHALATION) at 04:35

## 2021-01-01 RX ADMIN — MORPHINE SULFATE 4 MG: 4 INJECTION, SOLUTION INTRAMUSCULAR; INTRAVENOUS at 21:21

## 2021-01-01 RX ADMIN — SODIUM CHLORIDE: 9 INJECTION, SOLUTION INTRAVENOUS at 09:07

## 2021-01-01 RX ADMIN — IPRATROPIUM BROMIDE AND ALBUTEROL SULFATE 1 AMPULE: .5; 3 SOLUTION RESPIRATORY (INHALATION) at 20:31

## 2021-01-01 RX ADMIN — MORPHINE SULFATE 4 MG: 4 INJECTION, SOLUTION INTRAMUSCULAR; INTRAVENOUS at 00:25

## 2021-01-01 RX ADMIN — POTASSIUM CHLORIDE 10 MEQ: 7.46 INJECTION, SOLUTION INTRAVENOUS at 06:28

## 2021-01-01 RX ADMIN — PIPERACILLIN AND TAZOBACTAM 3375 MG: 3; .375 INJECTION, POWDER, LYOPHILIZED, FOR SOLUTION INTRAVENOUS at 11:06

## 2021-01-01 RX ADMIN — Medication 0.2 MG: at 16:53

## 2021-01-01 RX ADMIN — IPRATROPIUM BROMIDE AND ALBUTEROL SULFATE 1 AMPULE: .5; 3 SOLUTION RESPIRATORY (INHALATION) at 15:32

## 2021-01-01 RX ADMIN — SODIUM CHLORIDE 500 ML: 9 INJECTION, SOLUTION INTRAVENOUS at 06:40

## 2021-01-01 RX ADMIN — POTASSIUM CHLORIDE 10 MEQ: 7.46 INJECTION, SOLUTION INTRAVENOUS at 07:19

## 2021-01-01 RX ADMIN — SODIUM CHLORIDE, PRESERVATIVE FREE 10 ML: 5 INJECTION INTRAVENOUS at 13:45

## 2021-01-01 RX ADMIN — IPRATROPIUM BROMIDE AND ALBUTEROL SULFATE 1 AMPULE: .5; 3 SOLUTION RESPIRATORY (INHALATION) at 08:08

## 2021-01-01 RX ADMIN — SODIUM CHLORIDE, PRESERVATIVE FREE 10 ML: 5 INJECTION INTRAVENOUS at 20:01

## 2021-01-01 RX ADMIN — PIPERACILLIN AND TAZOBACTAM 3375 MG: 3; .375 INJECTION, POWDER, LYOPHILIZED, FOR SOLUTION INTRAVENOUS at 20:38

## 2021-01-01 RX ADMIN — MAGNESIUM SULFATE HEPTAHYDRATE 2000 MG: 40 INJECTION, SOLUTION INTRAVENOUS at 08:17

## 2021-01-01 RX ADMIN — IPRATROPIUM BROMIDE AND ALBUTEROL SULFATE 1 AMPULE: .5; 3 SOLUTION RESPIRATORY (INHALATION) at 15:45

## 2021-01-01 RX ADMIN — LEVOFLOXACIN 750 MG: 5 INJECTION, SOLUTION INTRAVENOUS at 08:49

## 2021-01-01 RX ADMIN — IPRATROPIUM BROMIDE AND ALBUTEROL SULFATE 1 AMPULE: .5; 3 SOLUTION RESPIRATORY (INHALATION) at 20:43

## 2021-01-01 RX ADMIN — MORPHINE SULFATE 4 MG: 4 INJECTION, SOLUTION INTRAMUSCULAR; INTRAVENOUS at 17:52

## 2021-01-01 RX ADMIN — DOXYCYCLINE 100 MG: 100 INJECTION, POWDER, LYOPHILIZED, FOR SOLUTION INTRAVENOUS at 18:26

## 2021-01-01 RX ADMIN — POTASSIUM CHLORIDE 10 MEQ: 7.46 INJECTION, SOLUTION INTRAVENOUS at 08:46

## 2021-01-01 RX ADMIN — SODIUM CHLORIDE: 9 INJECTION, SOLUTION INTRAVENOUS at 23:52

## 2021-01-01 ASSESSMENT — PAIN SCALES - PAIN ASSESSMENT IN ADVANCED DEMENTIA (PAINAD)
NEGVOCALIZATION: 0
BREATHING: 2
BODYLANGUAGE: 1
TOTALSCORE: 4
BODYLANGUAGE: 1
FACIALEXPRESSION: 0
TOTALSCORE: 3
NEGVOCALIZATION: 1
BREATHING: 1
FACIALEXPRESSION: 0
CONSOLABILITY: 0
CONSOLABILITY: 0
TOTALSCORE: 3
TOTALSCORE: 3
NEGVOCALIZATION: 1
BREATHING: 2
BREATHING: 1
BODYLANGUAGE: 1
FACIALEXPRESSION: 0
NEGVOCALIZATION: 1
CONSOLABILITY: 0

## 2021-01-01 ASSESSMENT — PAIN SCALES - GENERAL
PAINLEVEL_OUTOF10: 0
PAINLEVEL_OUTOF10: 0

## 2021-01-01 ASSESSMENT — PAIN SCALES - WONG BAKER
WONGBAKER_NUMERICALRESPONSE: 0
WONGBAKER_NUMERICALRESPONSE: 0

## 2021-03-29 PROBLEM — J18.9 PNEUMONIA: Status: ACTIVE | Noted: 2021-01-01

## 2021-03-29 NOTE — ED PROVIDER NOTES
I have reviewed the chief complaint and history of present illness and review of systems as well as the past medical/social/family history sections for this patient. Moreover, I have examined this patient, and participated in the care of this patient. I have reviewed the pertinent clinical information including: physical exam, labs, radiographic studies and the plan of care. I personally evaluated the patient, who could have sepsis due to pneumonia. He is hypothermic, and is currently being warmed by the Jeanmarie-Hugger. Pt is altered, even more than his baseline despite his Lewy Body dementia. Pt has mildly prolonged QTc, will benefit from magnesium infusion. Will give atropine, pacerpads if bradycardia worsens. CRITICAL CARE NOTE:   There was a high probability of clinically significant life-threatening deterioration of the patient's condition requiring my urgent intervention. Total critical care time is 30 minutes. This includes vital sign monitoring, pulse oximetry monitoring, telemetry monitoring, clinical response to the IV medications, reviewing the nursing notes, consultation time, dictation/documetation time. (This time excludes time spent performing procedures). Pt's sepsis, pneumonia and concurrent bradycardia could decompensate into high degree block.     Labs Reviewed   CBC WITH AUTO DIFFERENTIAL - Abnormal; Notable for the following components:       Result Value    RBC 3.76 (*)     Hemoglobin 12.5 (*)     Hematocrit 36.7 (*)     MCV 97.6 (*)     MCH 33.2 (*)     RDW-CV 15.4 (*)     RDW-SD 54.3 (*)     Platelets 74 (*)     MPV 13.0 (*)     Lymphocytes Absolute 0.9 (*)     All other components within normal limits   BASIC METABOLIC PANEL - Abnormal; Notable for the following components:    Sodium 146 (*)     Potassium 3.2 (*)     Glucose 110 (*)     All other components within normal limits   TSH WITHOUT REFLEX - Abnormal; Notable for the following components:    TSH 4.840 (*)     All other components within normal limits   SALICYLATE LEVEL - Abnormal; Notable for the following components:    Salicylate, Serum < 0.3 (*)     All other components within normal limits   URINALYSIS WITH MICROSCOPIC - Abnormal; Notable for the following components:    Ketones, Urine TRACE (*)     All other components within normal limits   ANION GAP - Abnormal; Notable for the following components:    Anion Gap 7.0 (*)     All other components within normal limits   GLOMERULAR FILTRATION RATE, ESTIMATED - Abnormal; Notable for the following components:    Est, Glom Filt Rate 82 (*)     All other components within normal limits   APTT - Abnormal; Notable for the following components:    aPTT 43.3 (*)     All other components within normal limits   POCT GLUCOSE - Abnormal; Notable for the following components:    POC Glucose 112 (*)     All other components within normal limits   COVID-19, RAPID   CULTURE, BLOOD 1   CULTURE, BLOOD 2   CULTURE, BLOOD 2   MAGNESIUM   PHOSPHORUS   LACTATE, SEPSIS   ACETAMINOPHEN LEVEL   ETHANOL   AMMONIA   URINE DRUG SCREEN   OSMOLALITY   T4, FREE   PROTIME-INR   VITAMIN B12 & FOLATE       CT HEAD WO CONTRAST   Final Result    No evidence of an acute process. **This report has been created using voice recognition software. It may contain minor errors which are inherent in voice recognition technology. **      Final report electronically signed by Dr. Bakari Langley on 3/29/2021 7:52 AM      XR CHEST PORTABLE   Final Result   Impression:   Coarse right upper lobe density, new vs prior, most consistent with    infiltrate. This document has been electronically signed by:  Warren Serrato MD on    03/29/2021 06:43 AM             Robert Ledezma MD  03/29/21 2336

## 2021-03-29 NOTE — PLAN OF CARE
Plan of Care Update    Name: Chele Groves  : 1945  MRN: [id number]  Date of Service: 3/29/21      10:36 AM: Patient's wife, Ashely Perez, and daughter, Heather Morgan at bedside has requested to change the patient's CODE STATUS to LIMITED-CODE x4 and requesting palliative care consult. Resuscitation/Code Status Note on Chele Groves (YOB: 1945)    At 1035 on 2021, resuscitation/code status decision was based on a thorough discussion with the patient, patient's spouse - Ashely Perez and patient's adult child - Heather Morgan. The code status was made Limited Limited Code details: Intubation/Re-intubation No; Defibrillation/Cardioversion No; Chest Compressions No; Resuscitative Medications No; Other No Comment  .     Electronically signed by Gail Swann DO on 3/29/21 at 10:36 AM EDT          Electronically signed by Gail Swann DO on 3/29/2021 at 10:36 AM

## 2021-03-29 NOTE — PROGRESS NOTES
[address] [address]  SPEECH THERAPY MISSED TREATMENT NOTE  [facility] CCU-STEPDOWN 3B      Date: 3/29/2021  Patient Name: Ezequiel Quezada        MRN: [id number]    : 1945  (78 y.o.)    REASON FOR MISSED TREATMENT:  Received new order for swallowing evaluation; upon attempt at 1230 RN Mamie requesting to hold at this time d/t palliative care meeting being held with family within patient's room. ST will hold at this time and re-attempt later this date as clinically appropriate pending patient/family decisions / schedule permits.     Should ST services be warranted prior to next attempt please call at [address]

## 2021-03-29 NOTE — ED TRIAGE NOTES
Patient presents via EMS for concerns of altered mental status. EMS states they were called for an unresponsive patient. States on arrival they witnessed patient having snoring respirations and pinpoint pupils. Patient was given narcan 4mg by EMS. States patients HR on arrival was 41. States they gave patient 1mg of atropine. Wife states she noticed patient had progressively gone down hill over the last two weeks. States patient has dementia and parkinsons and was seeing a specialist in [address] [place] [address] Av for this. Wife states last week she noticed him being more sleepy and having trouble swallowing.

## 2021-03-29 NOTE — PROGRESS NOTES
1145: Patient admitted to 6k20, patients temp 92.3 on admission, bear hugger on. Palliative care consulted. Notified Dr. Grima Gerber, wanting to keep patient on this unit with low temp. This RN called palliative care to see this patient to confirm families wishes. 12:30 Palliative care confirmed with family that they still want all treatment done, notified Dr. Girma Gerber orders for stepdown transfer. 1305: Report called to 3B RN, Kalia Campos. 1330: Patient transported to [phone].

## 2021-03-29 NOTE — PROGRESS NOTES
[facility]           Progress Note    Patient Name:  Laurice Angelucci  Medical Record Number:  [id number]  YOB: 1945    Date:  3/29/2021  Time:  12:58 PM  Completed By:  Fuad Reveles RN    Reason for Palliative Care Evaluation:  Goals of care/?hospice    Advance Directives:none on file  [] [facility] DNR Form  [] Living Will  [] Medical POA    Current Code Status  [] Full Resuscitation  [] DNR-Comfort Care-Arrest  [] DNR-Comfort Care  [x] Limited   [x] No CPR   [x] No shock   [x] No ET intubation/reintubation   [x] No resuscitative medications   [] Other limitation:    Performance Status:  10    Family/Patient Discussion:  Patient resting in bed. Patient with occasional harsh cough. Nasal trumpet in place. Jeanmarie hugger on. Patient's rectal temp 92.3. Patient's wife and daughter are at the bedside. Palliative care introduced. Wife states that the patient was coughing/\"frothy\" this morning and ice cold and this is why the patient was brought to the ER. Patient's wife denies that the patient had any difficulty with eating or swallowing but she is aware that this is considered an aspiration pneumonia. Discussed progression of dementia often leads to dysphagia/aspiration. Discussed that the patient is very ill (unable to maintain own body temperature) and if he does improve, discussed the likelihood of speech therapy evaluation for swallowing evaluation. Discussed possibility of change in diet (thickened liquids) may be applied and discussed that this may contribute to decrease in oral intake if patient does not find it palatable. Patient's wife and daughter do indicate to this RN that they will be unable to care for the patient at home. Wife and daughter do have questions regarding ECF and they also have concerns regarding payment of ECF. Discussed involvement of  and family is receptive.   Discussed that the patient will require many aggressive measures to support him at this time and discussed that the patient may be transferred to a different floor due to this. Wife/daughter confirm that they wish for continued aggressive measures at this time although they would not want the patient to be intubated or have resuscitative measures. Wife and daughter do not wish for comfort care/hospice at this time. Emotional support provided. Plan/Follow-Up:  Updated primary RNMamie. Updated Dr. Portia Haro and order received to transfer the patient to a stepdown unit. Palliative care will remain available if further needs arise. Please call prn.     Ariel Santos RN  3/29/2021,  12:58 PM

## 2021-03-29 NOTE — ED NOTES
HR now 63 after 0.5mg of atropine     Cresencio Chu, [facility]  03/29/21 2038

## 2021-03-29 NOTE — H&P
Hospitalist H+P      Patient:  Lee Shankar    Unit/Bed:6K-20/020-A  YOB: 1945  MRN: [id number]   Acct: [de-identified]     PCP: Darwin Gonzalez MD  Date of Admission: 3/29/2021        Assessment and Plan:        1. Acute encephalopathy secondary to advanced dementia and aspiration pneumonia: Neurochecks, fall precautions n.p.o. with swallowing eval  2. Aspiration pneumonia right upper lobe consolidation we will continue with Zosyn and Zithromax. 3. Acute hypoxic respiratory failure secondary to aspiration pneumonia will need supplemental oxygen will place on DuoNeb treatments  4. Hypothermia we will continue with bear hugger  5. Bradycardia: Slight improvement rates approximately 66  6. Prolonged QTC possibly secondary to medication  7. Hypokalemia will order potassium protocol and replacement  8. Thrombocytopenia platelets 74 we will place on sequential compression devices hold Lovenox  9. History of Lewy body dementia continue with home medication  10. History of Parkinson's continue with home medications    CC: Unresponsive and gurgling    HPI:\"Stefan Herrera Is a 78 y.o. male with PMHx of HTN, Lewy body dementia, and parkinson disease who presented to [facility] emergency department for acute onset mental status change per wife and cough. The patient's wife stated that the patient presented to [address] [place] [address] last week for dehydration and hypokalemia. The patient was kept overnight, prescribed potassium, rehydrated and discharged home in the care of his wife. The patient's wife stated that this morning the patient began coughing, and having white foaming from his mouth. EMS was called and upon arrival they gave Narcan and atropine.     Upon arrival to the emergency department the patient was found to be hypothermic, hypokalemic, prolonged QTc, and bradycardic. The patient was given 1 dose Narcan, atropine 0.5mg, magnesium sulfate 2g, and levofloxacin 750mg.  The hospital service was contacted for admission however they deferred acceptance due to patient's acute state. The ICU was consulted to evaluate the patient for possible admission.     Upon evaluation the patient was not responding to commands, had nasal trumpet in place, and using 2 L/min nasal cannula supplemental oxygen. Extensive discussion was had with the patient's wife Yue Polanco and adult daughter Camila العراقي regarding the patient's plan of care. It was their determination that the patient CODE STATUS be changed to LIMITED x4 and they prefer that he continue to receive treatment however they would not like the patient to be intubated, chest compressions, use of ACLS medications, or defibrillation. \"    ROS (14 point review of systems completed. Pertinent positives noted. Otherwise ROS is negative) : Unresponsive to questions    PMH:  Per HPI and       Diagnosis Date    [facility] ([facility]     Parkinson's disease ([facility]      SHX:  History reviewed. No pertinent surgical history. FHX: History reviewed. No pertinent family history.   SOCHX:   Social History     Socioeconomic History    Marital status:      Spouse name: None    Number of children: None    Years of education: None    Highest education level: None   Occupational History    None   Social Needs    Financial resource strain: None    Food insecurity     Worry: None     Inability: None    Transportation needs     Medical: None     Non-medical: None   Tobacco Use    Smoking status: Former Smoker    Smokeless tobacco: Never Used   Substance and Sexual Activity    Alcohol use: Never     Frequency: Never    Drug use: Never    Sexual activity: None   Lifestyle    Physical activity     Days per week: None     Minutes per session: None    Stress: None   Relationships    Social connections     Talks on phone: None     Gets together: None     Attends Sikh service: None     Active member of club or organization: None     Attends meetings of clubs or organizations: None Relationship status: None    Intimate partner violence     Fear of current or ex partner: None     Emotionally abused: None     Physically abused: None     Forced sexual activity: None   Other Topics Concern    None   Social History Narrative    None      Allergies: Sulfa antibiotics  Medications:       sodium chloride flush  10 mL Intravenous 2 times per day    azithromycin  250 mg Intravenous Q24H    piperacillin-tazobactam  3,375 mg Intravenous Q8H     Prior to Admission medications    Medication Sig Start Date End Date Taking? Authorizing Provider   amLODIPine (NORVASC) 5 MG tablet Take 5 mg by mouth daily 8/24/18   Historical Provider, MD   QUEtiapine (SEROQUEL) 25 MG tablet Take 25 mg by mouth nightly 7/9/20 7/9/21  Historical Provider, MD   tolterodine (DETROL LA) 4 MG extended release capsule Take 1 capsule by mouth daily 10/26/18   Historical Provider, MD   fludrocortisone (FLORINEF) 0.1 MG tablet Take 0.1 mg by mouth daily 6/11/20 6/11/21  Historical Provider, MD   entacapone (COMTAN) 200 MG tablet Take 200 mg by mouth 3 times daily 6/11/20 6/11/21  Historical Provider, MD   carbidopa-levodopa (SINEMET)  MG per tablet Take 1 tablet by mouth 3 times daily 9/23/19 9/22/20  Historical Provider, MD      PHYSICAL EXAM:    /82   Pulse 61   Temp (!) 91.2 °F (32.9 °C) (Core)   Resp 16   Wt 147 lb (66.7 kg)   SpO2 95%   BMI 24.46 kg/m²     General appearance:  No apparent distress, appears stated age and cooperative. HEENT:  Normal cephalic, atraumatic without obvious deformity. Pupils equal, round, and reactive to light. Extra ocular muscles intact. Conjunctivae/corneas clear. Neck: Supple, with full range of motion. no jugular venous distention. Trachea midline. no carotid bruits  Respiratory: Slightly labored right upper lobe right middle lobe rhonchi  Cardiovascular: Bradycardic rate and rhythm with normal S1/S2 without murmurs, rubs or gallops.  PMI non displaced  Abdomen: Soft, non-tender, non-distended with normal bowel sounds. No guarding, rebound. Musculoskeletal:  No clubbing, cyanosis or edema bilaterally. Full range of motion without deformity. Skin: Skin color, texture, turgor normal.  No rashes or lesions, or suspicious lesions. Neurologic:  Neurovascularly intact without any focal sensory/motor deficits. Cranial nerves: II-XII intact, grossly non-focal.  Psychiatric:  Alert and oriented, thought content appropriate, normal insight  Capillary Refill: Brisk,< 2 seconds   Peripheral Pulses: +2 palpable, equal bilaterally upper and lower extremities  Lymphatics: no lymphadenopathy    Data: (All radiographs, tracings, PFTs, and imaging are personally viewed and interpreted unless otherwise noted).  Sodium 146   Potassium 3.2   Platelets 74  Recent Labs     03/29/21  0622   WBC 7.4   HGB 12.5*   HCT 36.7*   PLT 74*      Recent Labs     03/29/21  0622   *   K 3.2*      CO2 32   BUN 20   CREATININE 0.9   CALCIUM 9.3   PHOS 2.7       No results for input(s): AST, ALT, BILIDIR, BILITOT, ALKPHOS in the last 72 hours. Recent Labs     03/29/21  0700   INR 1.03       No results for input(s): Janeece Gambles in the last 72 hours. Radiology reports-images reviewed in [facility] 264, Lynn Marker 388 Contrast    Result Date: 3/29/2021  PROCEDURE: CT HEAD WO CONTRAST CLINICAL INFORMATION: sepsis. History of dementia. Found down. COMPARISON: Head CT 8/7/2020. TECHNIQUE: Noncontrast 5 mm axial images were obtained through the brain. Sagittal and coronal reconstructions were obtained. All CT scans at this facility use dose modulation, iterative reconstruction, and/or weight-based dosing when appropriate to reduce radiation dose to as low as reasonably achievable. FINDINGS: There is stable mild global volume loss. There is no hemorrhage. There are no intra-or extra-axial collections. There is no hydrocephalus, midline shift or mass effect.   The gray-white matter differentiation is preserved. There is a very mild amount of abnormal low attenuation in the white matter the brain suggesting chronic small vessel ischemic changes. There are vascular calcifications. The paranasal sinuses and mastoid air cells are normally aerated. There is a tube  in the right nasal cavity. No suspicious osseous lesions are present. No evidence of an acute process. **This report has been created using voice recognition software. It may contain minor errors which are inherent in voice recognition technology. ** Final report electronically signed by Dr. Farhat Snowden on 3/29/2021 7:52 AM    Xr Chest Portable    Result Date: 3/29/2021  Exam: 1V chest Comparison:  CR,SR  - XR CHEST 1 VW  - 08/07/2020 09:06 PM EDT Findings: Mediastinum: No cardiac silhouette enlargement. Lungs: Coarse right upper lobe density, new vs prior, most consistent with infiltrate. Left lung clear. Pleura: No pneumothorax or significant effusion. Bones: No acute pathology. Impression: Coarse right upper lobe density, new vs prior, most consistent with infiltrate. This document has been electronically signed by:  Олег Lewis MD on 03/29/2021 06:43 AM      Electronically signed by Sybil Hart DO on 3/29/2021 at 11:44 AM

## 2021-03-29 NOTE — PROGRESS NOTES
Patient arrived per cart to [phone] from [facility]K20. Heart monitor applied and vitals taken. 2 person skin assessment performed by this nurse and Annmarie Peralta RN.

## 2021-03-29 NOTE — PROGRESS NOTES
Amber Sifuentes. Esther Ross        1945      MRN [id number]    PCP:  Nicki Arteaga    Met with wife and daughter in ER room to complete ACP discussion. Family had made choice to change patient to Limited x4  this date. Wife is tearful and reviewed events of past 2 weeks. He was diagnosed with Parkinson's and Lewy Body Dementia in 2016. He was able to feed self, shave, assist with dressing, transfer self and walk 2 weeks ago. Wife has been caring for him at home. Daughter Kayley Pi is an only child. This am, patient was coughing, foam coming from nose and mouth and he was cold. At this time, he is alert, eyes track. Hypothermia blanket in place. O2 per nasal cannula and sats 95%. Dr. Autumn Dominguez entered and asked me if patient was going to be hospice. I stated I will discuss this with them so they can start thinking about the options ahead of them. He is admitting for pneumonia and family is ok with IV antibiotics and helping raise his body temperature. I discussed that though we do not know at this time where the course will lead, it is possible that he may 1) change and not leave hospital setting and that that staff could keep him comfortable, 2) He may become stable and be able to be transferred home or to a facility. Kayley Pi talked about how \"taking someone home doesn't allow you to be the daughter. \"    I reviewed hospice services briefly and how that may look in a home. I talked about care being easier in a bed verses transferring patient onto a toilet. I discussed skilled care vs private pay with hospice. I indicated that in his current condition, [facility] would not likely cover room and board as he is too critical to participate with therapy, so we will need to see how his condition is day to day.   I gave an average cost for care and indicated that in a facility he will need to be isolated the first 2 weeks after discharge from a hospital and encouraged them to ask what the visiting policy will be at locations they are interested in. I stated that other members of my team are available in the hospital M-F to help assist with this process and offered emotional support. An electronic signature was used to authenticate this note.      Satya Cornell RN, BSN, [address] [place] [address]

## 2021-03-29 NOTE — ED NOTES
ED to inpatient nurses report    Chief Complaint   Patient presents with    Altered Mental Status      Present to ED from home  LOC: responsive to pain  Vital signs   Vitals:    03/29/21 0822 03/29/21 0853 03/29/21 0951 03/29/21 1059   BP: 121/73  111/83 135/82   Pulse: (!) 48 57 57 61   Resp: 13 13 16 16   Temp: (!) 86.9 °F (30.5 °C) (!) 87.8 °F (31 °C) (!) 89.4 °F (31.9 °C) (!) 91.2 °F (32.9 °C)   TempSrc: Core Core Core Core   SpO2: 92% 92% 95% 95%   Weight:          Oxygen Baseline room air    Current needs required room air Bipap/Cpap No  LDAs:   Peripheral IV 03/29/21 Right Antecubital (Active)   Site Assessment Clean;Dry; Intact 03/29/21 1100   Line Status Normal saline locked 03/29/21 1100   Dressing Status Clean;Dry; Intact 03/29/21 1100       Peripheral IV 03/29/21 Left Antecubital (Active)   Site Assessment Clean;Dry; Intact 03/29/21 1100   Line Status Normal saline locked 03/29/21 1100   Dressing Status Clean;Dry; Intact 03/29/21 1100     Mobility: Requires assistance * 2  Pending ED orders: complete  Present condition: stable      Electronically signed by Anson Stokes RN on 3/29/2021 at 11:17 AM       Anson Stokes RN  03/29/21 1632

## 2021-03-29 NOTE — ED PROVIDER NOTES
[facility] Emergency Department    CHIEF COMPLAINT       Chief Complaint   Patient presents with    Altered Mental Status       Nurses Notes reviewed and I agree except as noted in the HPI. HISTORY OF PRESENT ILLNESS    Chele Groves luis m 78 y.o. male who presents to the ED for evaluation of altered mental status. Patient unable to provide any history. Wife at bedside states that the patient's been having waxing and waning mental status problems over the last several weeks. She notes that last week she took him to an outside hospital, ([facility]) and that he was admitted to the hospital, found to have hypokalemia, was given prescription for potassium. By the time of discharge she was able to stand up and walk around, communicate. Over the last several days this is deteriorated. Yesterday she was able to do anything with him. Therefore she called the ambulance service this morning she thought that he was going unresponsive. When EMS arrived, they noted that his heart rate was very low, and he was unresponsive. They gave him Narcan, atropine, and he noted improvement in his mental status, was able to communicate. In the right and this deteriorated again. Wife notes only new medicine is potassium prescribed by [facility]. He has a history of Parkinson's and Lewy body dementia dementia. He follows with Dr. Constantin Sánchez at [facility]. From electronic medical record it appears his Seroquel was increased in January from 25 to 50 mg nightly otherwise medications have stayed the same. Wife notes he has a history of hypertension, no other medical diagnoses noted. HPI was provided by the patient. REVIEW OF SYSTEMS     Review of Systems   Unable to perform ROS: Mental status change        PAST MEDICAL HISTORY     Past Medical History:   Diagnosis Date    Dementia ([facility]     Parkinson's disease ([facility]        SURGICALHISTORY      has no past surgical history on file.     CURRENT MEDICATIONS Other Topics Concern    Not on file   Social History Narrative    Not on file       PHYSICAL EXAM     INITIAL VITALS:  weight is 147 lb (66.7 kg). His core temperature is 91.2 °F (32.9 °C) (abnormal). His blood pressure is 135/82 and his pulse is 61. His respiration is 16 and oxygen saturation is 95%. Physical Exam  Vitals signs and nursing note reviewed. Constitutional:       General: He is not in acute distress. Appearance: Normal appearance. He is well-developed. He is not toxic-appearing. HENT:      Head: Normocephalic and atraumatic. Right Ear: External ear normal.      Left Ear: External ear normal.      Nose: Nose normal.      Mouth/Throat:      Pharynx: Uvula midline. Eyes:      Conjunctiva/sclera: Conjunctivae normal.      Comments: Pupils are bilaterally constricted pinpoint. Neck:      Musculoskeletal: Normal range of motion and neck supple. Cardiovascular:      Rate and Rhythm: Normal rate and regular rhythm. Heart sounds: Normal heart sounds, S1 normal and S2 normal.   Pulmonary:      Effort: Pulmonary effort is normal. No respiratory distress. Breath sounds: Normal breath sounds. Chest:      Chest wall: No tenderness. Abdominal:      General: Bowel sounds are normal. There is no distension. Palpations: Abdomen is soft. Tenderness: There is no abdominal tenderness. Skin:     General: Skin is dry. Capillary Refill: Capillary refill takes 2 to 3 seconds. Coloration: Skin is not pale. Findings: No erythema or rash. Neurological:      Mental Status: He is lethargic. GCS: GCS eye subscore is 4. GCS verbal subscore is 3. GCS motor subscore is 5. Psychiatric:         Behavior: Behavior normal.         Thought Content:  Thought content normal.         Judgment: Judgment normal.         DIFFERENTIAL DIAGNOSIS:   Electrolyte abnormality, medication toxicity, sepsis, stroke, delirium, anticholinergic syndrome  DIAGNOSTIC RESULTS     EKG: All EKG's are interpreted by the Emergency Department Physician who eithersigns or Co-signs this chart in the absence of a cardiologist.    EKG read and interpreted by myself with comparison to 8/7/2020 gives impression of normal sinus rhythm with heart rate of 60; interval 218; ;QTc 530; axis P-49, R-14, T-60. Noted prolonged QTC    RADIOLOGY: non-plainfilm images(s) such as CT, Ultrasound and MRI are read by the radiologist.  Plain radiographic images are visualized and preliminarily interpreted by the emergency physician unless otherwise stated below. CT HEAD WO CONTRAST   Final Result    No evidence of an acute process. **This report has been created using voice recognition software. It may contain minor errors which are inherent in voice recognition technology. **      Final report electronically signed by Dr. Nik Sandoval on 3/29/2021 7:52 AM      XR CHEST PORTABLE   Final Result   Impression:   Coarse right upper lobe density, new vs prior, most consistent with    infiltrate. This document has been electronically signed by:  Gwendolyn Cain MD on    03/29/2021 06:43 AM            LABS:   Labs Reviewed   CBC WITH AUTO DIFFERENTIAL - Abnormal; Notable for the following components:       Result Value    RBC 3.76 (*)     Hemoglobin 12.5 (*)     Hematocrit 36.7 (*)     MCV 97.6 (*)     MCH 33.2 (*)     RDW-CV 15.4 (*)     RDW-SD 54.3 (*)     Platelets 74 (*)     MPV 13.0 (*)     Lymphocytes Absolute 0.9 (*)     All other components within normal limits   BASIC METABOLIC PANEL - Abnormal; Notable for the following components:    Sodium 146 (*)     Potassium 3.2 (*)     Glucose 110 (*)     All other components within normal limits   TSH WITHOUT REFLEX - Abnormal; Notable for the following components:    TSH 4.840 (*)     All other components within normal limits   SALICYLATE LEVEL - Abnormal; Notable for the following components:    Salicylate, Serum < 0.3 (*)     All other components within normal limits   URINALYSIS WITH MICROSCOPIC - Abnormal; Notable for the following components:    Ketones, Urine TRACE (*)     All other components within normal limits   ANION GAP - Abnormal; Notable for the following components:    Anion Gap 7.0 (*)     All other components within normal limits   GLOMERULAR FILTRATION RATE, ESTIMATED - Abnormal; Notable for the following components:    Est, Glom Filt Rate 82 (*)     All other components within normal limits   APTT - Abnormal; Notable for the following components:    aPTT 43.3 (*)     All other components within normal limits   POCT GLUCOSE - Abnormal; Notable for the following components:    POC Glucose 112 (*)     All other components within normal limits   COVID-19, RAPID   CULTURE, BLOOD 1   CULTURE, BLOOD 2   MAGNESIUM   PHOSPHORUS   LACTATE, SEPSIS   ACETAMINOPHEN LEVEL   ETHANOL   AMMONIA   URINE DRUG SCREEN   OSMOLALITY   PROTIME-INR   T4, FREE   VITAMIN B12 & FOLATE       EMERGENCY DEPARTMENT COURSE:   Vitals:    Vitals:    03/29/21 0822 03/29/21 0853 03/29/21 0951 03/29/21 1059   BP: 121/73  111/83 135/82   Pulse: (!) 48 57 57 61   Resp: 13 13 16 16   Temp: (!) 86.9 °F (30.5 °C) (!) 87.8 °F (31 °C) (!) 89.4 °F (31.9 °C) (!) 91.2 °F (32.9 °C)   TempSrc: Core Core Core Core   SpO2: 92% 92% 95% 95%   Weight:           MDM    Patient was seen and evaluated in the emergency department, patient appeared to be in no acute distress, vital signs were reviewed, bradycardia was noted. Physical exam was completed, he was able to track me throughout the room, he was not voluntarily moving any of his extremities, he did retract to pain. He had a GCS of 12. He had pinpoint pupils, cool skin, core temperature was obtained and was 85 degrees. Warm blanket and warmed IV fluids were administered. Labs were obtained, new thrombocytopenia noted, TSH slightly increased but not significantly CT head shows no significant abnormality.   X-ray shows possible right upper lobe pneumonia. This is likely consistent with the patient's decreased mental status over the last week. He probably had aspiration pneumonia. He is treated with Rocephin and Levaquin. I discussed the case with hospitalist service, they were uncomfortable with admitting the patient due to hypothermia, discussed the case with intensive care service, they came and evaluated, the patient's wife would now like the patient be a limited code. And therefore the patient does not need to be admitted to the ICU as there is nothing for the intensive care service to perform. Discussed again with the hospital service and they graciously admit. This was all discussed with the patient's wife who is amenable with admission. While here in the emergency department patient's temperature increased, heart rate increased, and patient remained in a guarded state. Medications   sodium chloride flush 0.9 % injection 10 mL (has no administration in time range)   sodium chloride flush 0.9 % injection 10 mL (has no administration in time range)   azithromycin (ZITHROMAX) 250 mg in dextrose 5 % 250 mL IVPB (has no administration in time range)   piperacillin-tazobactam (ZOSYN) 3,375 mg in dextrose 5 % 50 mL IVPB extended infusion (mini-bag) (has no administration in time range)   0.9 % sodium chloride bolus (0 mLs Intravenous Stopped 3/29/21 0820)   atropine injection 0.5 mg (0.5 mg Intravenous Given 3/29/21 0650)   magnesium sulfate 2000 mg in 50 mL IVPB premix (0 mg Intravenous Stopped 3/29/21 1100)   cefTRIAXone (ROCEPHIN) 1000 mg IVPB in 50 mL D5W minibag (0 mg Intravenous Stopped 3/29/21 0850)   levoFLOXacin (LEVAQUIN) 750 MG/150ML infusion 750 mg (0 mg Intravenous Stopped 3/29/21 1100)       Case was staffed with Dr. Mckeon Ship:   See the note of Dr. Ambrocio Carrasquillo    CONSULTS:  Dr. Fabio Hannon hospital service  Dr. Espinoza/Dr. Herman Ibrahim intensive care service    PROCEDURES:  None    FINAL IMPRESSION     1.  Altered mental status, unspecified altered mental status type    2. Hypothermia, initial encounter    3. Pneumonia of right upper lobe due to infectious organism    4. Bradycardia          DISPOSITION/PLAN   Patient admitted to hospital service  PATIENT REFERREDTO:  No follow-up provider specified. DISCHARGE MEDICATIONS:  New Prescriptions    No medications on file       (Please note that portions of this note were completed with a voice recognition program.  Efforts were made to edit the dictations but occasionally words are mis-transcribed.)    Provider:  I personally performed the services described in the documentation,reviewed and edited the documentation which was dictated to the scribe in my presence, and it accurately records my words and actions.     Neymar Hutchison, THIERNO 03/29/21 11:23 AM    Jamie Hutchison, APRN - CNP        Jelli, APRN - CNP  03/29/21 [facility] [address] Counts, APRN - CNP  03/29/21 1125

## 2021-03-29 NOTE — ED NOTES
Narcan 2mg given IV per verbal order from [facility] ESTELITA Martin RN  03/29/21 6525

## 2021-03-29 NOTE — ACP (ADVANCE CARE PLANNING)
Advance Care Planning     Advance Care Planning Activator (Inpatient)  Conversation Note      Date of ACP Conversation: 3/29/2021    Conversation Conducted with: Healthcare decision maker    ACP Activator: Urmila Blanc RN, BSN, [facility]    Health Care Decision Maker:     Current Designated Health Care Decision Maker:     Primary Decision Maker: Mariaelena Panchal - Spouse - [phone]    Secondary Decision Maker: Calin Lambert - Child - [phone]    Care Preferences    Ventilation: \"If you were in your present state of health and suddenly became very ill and were unable to breathe on your own, what would your preference be about the use of a ventilator (breathing machine) if it were available to you? \"      Would the patient desire the use of ventilator (breathing machine)?: no    \"If your health worsens and it becomes clear that your chance of recovery is unlikely, what would your preference be about the use of a ventilator (breathing machine) if it were available to you? \"     Would the patient desire the use of ventilator (breathing machine)?: No      Resuscitation  \"CPR works best to restart the heart when there is a sudden event, like a heart attack, in someone who is otherwise healthy. Unfortunately, CPR does not typically restart the heart for people who have serious health conditions or who are very sick. \"    \"In the event your heart stopped as a result of an underlying serious health condition, would you want attempts to be made to restart your heart (answer \"yes\" for attempt to resuscitate) or would you prefer a natural death (answer \"no\" for do not attempt to resuscitate)? \" no       [x] Yes   [] No   Educated Patient / Franca Varela regarding differences between Advance Directives and portable DNR orders.     Length of ACP Conversation in minutes:  20  Conversation Outcomes:  [x] ACP discussion completed  [] Existing advance directive reviewed with patient; no changes to patient's previously recorded being easier in a bed verses transferring patient onto a toilet. I discussed skilled care vs private pay with hospice. I indicated that in his current condition, [facility] would not likely cover room and board as he is too critical to participate with therapy, so we will need to see how his condition is day to day. I gave an average cost for care and indicated that in a facility he will need to be isolated the first 2 weeks after discharge from a hospital and encouraged them to ask what the visiting policy will be at locations they are interested in. I stated that other members of my team are available in the hospital M-F to help assist with this process and offered emotional support.               Rodolfo Cruz, RN, BSN, [address] [place] [address]

## 2021-03-29 NOTE — CONSULTS
Follows with Dr. Damion Bey (neurology) in [facility]. - Continue home memantine    Hx of Parkinson - Follows with Dr. Damion Bey (neurology) in [place] 222 home entacapone and cabidopa/levodopa    INITIAL H AND P AND ICU COURSE:  Collin Delacruz Is a 78 y.o. male with PMHx of HTN, Lewy body dementia, and parkinson disease who presented to [facility] emergency department for acute onset mental status change per wife and cough. The patient's wife stated that the patient presented to [address] [place] [address] last week for dehydration and hypokalemia. The patient was kept overnight, prescribed potassium, rehydrated and discharged home in the care of his wife. The patient's wife stated that this morning the patient began coughing, and having white foaming from his mouth. EMS was called and upon arrival they gave Narcan and atropine. Upon arrival to the emergency department the patient was found to be hypothermic, hypokalemic, prolonged QTc, and bradycardic. The patient was given 1 dose Narcan, atropine 0.5mg, magnesium sulfate 2g, and levofloxacin 750mg. The hospital service was contacted for admission however they deferred acceptance due to patient's acute state. The ICU was consulted to evaluate the patient for possible admission. Upon evaluation the patient was not responding to commands, had nasal trumpet in place, and using 2 L/min nasal cannula supplemental oxygen. Extensive discussion was had with the patient's wife Carlee Hill and adult daughter Samia Ann regarding the patient's plan of care. It was their determination that the patient CODE STATUS be changed to LIMITED x4 and they prefer that he continue to receive treatment however they would not like the patient to be intubated, chest compressions, use of ACLS medications, or defibrillation. Juan Duncan NP was updated via [employer] and recommendation for the patient to be admitted to the hospital service (Dr. Fabiola Contreras) at this time.       Past Medical History: Per HPI  Family History: none  Social History: Former smoker, denies EtOH use, denies illicit drug use per family at bedside    Review of Systems   Unable to perform ROS: Dementia       Scheduled Meds:   magnesium sulfate  2,000 mg Intravenous Once    sodium chloride flush  10 mL Intravenous 2 times per day    levofloxacin  750 mg Intravenous Once     Continuous Infusions:    PHYSICAL EXAMINATION:  Vitals:  Temp: (!) 87.8 °F (31 °C)  Pulse: 57  Resp: 13  BP: 121/73  SpO2: 92 %  Ashlee Coma Scale  Eye Opening: To speech  Best Verbal Response: Incomprehensible speech  Best Motor Response: Withdraws from pain  [place] Coma Scale Score: 9      Input/Output  No intake/output data recorded. BMI:  Body mass index is 24.46 kg/m². Oxygenation/Ventilation              Physical Exam  Vitals signs and nursing note reviewed. Constitutional:       General: He is in acute distress. Appearance: Normal appearance. He is normal weight. He is ill-appearing. HENT:      Head: Normocephalic and atraumatic. Right Ear: External ear normal.      Left Ear: External ear normal.      Nose: Nose normal.      Mouth/Throat:      Mouth: Mucous membranes are moist.      Pharynx: Oropharynx is clear. Eyes:      Conjunctiva/sclera: Conjunctivae normal.      Pupils: Pupils are equal, round, and reactive to light. Neck:      Musculoskeletal: Normal range of motion and neck supple. No neck rigidity. Vascular: No carotid bruit. Cardiovascular:      Rate and Rhythm: Regular rhythm. Bradycardia present. Pulses: Normal pulses. Heart sounds: Normal heart sounds. Pulmonary:      Effort: Pulmonary effort is normal.      Breath sounds: Normal breath sounds. Abdominal:      General: Abdomen is flat. Bowel sounds are normal.      Palpations: Abdomen is soft. Genitourinary:     Comments: Mathews catheter  Musculoskeletal: Normal range of motion. Lymphadenopathy:      Cervical: No cervical adenopathy.    Skin:

## 2021-03-30 NOTE — PROGRESS NOTES
Patient with labored breathing and oxygen needs have increased. Patient's wife and daughter are at the bedside. Family shares that the physician is not optimistic about the patient's condition and that he likely has a short time. Asked if the option of comfort was discussed and family states that they have spoken to hospice. Emotional support provided. Please call palliative care if needs arise.

## 2021-03-30 NOTE — PROGRESS NOTES
Case reviewed with Dr. Amber Ramsey, medical director, feels patient would meet for hospice care if family agreeable to [facility] status. Went to room with wife Martin Wagner and daughter Jose Antonio Guardian present with patient getting breathing treatment. Patient getting breathing treatment and oxygen had dropped. Air entrapment mask applied at 50 L/min for patient oxygen dropping and work of breathing. Patient with use of accessory muscles with work of breathing, soda scale for breathing at RED. Discussed with family at bedside that patient would qualify for hospice care if agreeable to [facility] status. Educated on comfort care status and hospice concepts/philosophies. Went over with family that hospice is when focus on comfort with stopping ALL aggressive measures including fluids, IV ATB, diagnostic testing such as xray, labs, oral medications, and monitoring of heart rhythm and pulse ox. Family tearful- aware that oxygen dropping. Told them that sometimes people do not decide on hospice and that is okay. Ask to have time to discuss among themselves and will let primary nurse know, if wishing for service. Gave primary nurse [facility] RN phone number to call, if family would decide on hospice service.

## 2021-03-30 NOTE — PLAN OF CARE
Patient continues on breathing treatments; pt suctioned orally; thick secretions.   Venti mask placed on patient due to sats of 84%; explained to patient why I was suctioning patient and why venti mask was placed

## 2021-03-30 NOTE — FLOWSHEET NOTE
03/30/21 0454   Provider Notification   Reason for Communication Evaluate; Review case   Provider Name Dr. Roman Loop   Provider Notification Physician   Method of Communication Secure Message   Response See orders   Notification Time    Shift Event Other (comment)       his CR went from 0.9 to 1.5 this AM, and wbc went from 7.4 to 17.6 this AM, The bc that got sent yesterday showed no new prelim growth so far, and hbg went from 12.5 to 11.0 this AM. U/O for my shift is 350, his K+ require replacement for 40meq, the IV order does not allow me to give it, do you want me to put in a once K+ replacement order? ?      See MAR.

## 2021-03-30 NOTE — PROGRESS NOTES
Medical: None     Non-medical: None   Tobacco Use    Smoking status: Former Smoker    Smokeless tobacco: Never Used   Substance and Sexual Activity    Alcohol use: Never     Frequency: Never    Drug use: Never    Sexual activity: None   Lifestyle    Physical activity     Days per week: None     Minutes per session: None    Stress: None   Relationships    Social connections     Talks on phone: None     Gets together: None     Attends Hindu service: None     Active member of club or organization: None     Attends meetings of clubs or organizations: None     Relationship status: None    Intimate partner violence     Fear of current or ex partner: None     Emotionally abused: None     Physically abused: None     Forced sexual activity: None   Other Topics Concern    None   Social History Narrative    None      Allergies: Sulfa antibiotics  Medications:     sodium chloride      sodium chloride 100 mL/hr at 03/30/21 0907      entacapone  200 mg Oral TID    fludrocortisone  0.1 mg Oral Daily    sodium chloride flush  10 mL Intravenous 2 times per day    famotidine (PEPCID) injection  20 mg Intravenous BID    piperacillin-tazobactam  3,375 mg Intravenous Q8H    carbidopa-levodopa  1 tablet Oral TID    doxycycline (VIBRAMYCIN) IV  100 mg Intravenous Q12H    ipratropium-albuterol  1 ampule Inhalation Q4H     Prior to Admission medications    Medication Sig Start Date End Date Taking?  Authorizing Provider   carbidopa-levodopa (SINEMET)  MG per tablet Take 1 tablet by mouth 3 times daily   Yes Historical Provider, MD   potassium chloride (KLOR-CON M) 20 MEQ TBCR extended release tablet Take 20 mEq by mouth daily (with breakfast)   Yes Historical Provider, MD   memantine (NAMENDA) 10 MG tablet Take 10 mg by mouth 2 times daily   Yes Historical Provider, MD   donepezil (ARICEPT) 10 MG tablet Take 20 mg by mouth daily   Yes Historical Provider, MD   amLODIPine (NORVASC) 5 MG tablet Take 5 mg by mouth daily 8/24/18  Yes Historical Provider, MD   QUEtiapine (SEROQUEL) 25 MG tablet Take 25 mg by mouth nightly 7/9/20 7/9/21 Yes Historical Provider, MD   tolterodine (DETROL LA) 4 MG extended release capsule Take 1 capsule by mouth daily 10/26/18  Yes Historical Provider, MD   fludrocortisone (FLORINEF) 0.1 MG tablet Take 0.1 mg by mouth daily 6/11/20 6/11/21 Yes Historical Provider, MD   entacapone (COMTAN) 200 MG tablet Take 200 mg by mouth 3 times daily 6/11/20 6/11/21 Yes Historical Provider, MD      PHYSICAL EXAM:    /63   Pulse 76   Temp 99.4 °F (37.4 °C) (Axillary) Comment: fan placed on pt  Resp 20   Wt 166 lb 12.8 oz (75.7 kg)   SpO2 93%   BMI 27.76 kg/m²     General appearance:  No apparent distress, appears stated age and cooperative. HEENT:  Normal cephalic, atraumatic without obvious deformity. Pupils equal, round, and reactive to light. Extra ocular muscles intact. Conjunctivae/corneas clear. Neck: Supple, with full range of motion. no jugular venous distention. Trachea midline. no carotid bruits  Respiratory:  labored respirations, rhonchorous breath sounds all fields  Cardiovascular: Bradycardic rate and rhythm with normal S1/S2 without murmurs, rubs or gallops. PMI non displaced  Abdomen: Soft, non-tender, non-distended with normal bowel sounds. No guarding, rebound. Musculoskeletal:  No clubbing, cyanosis or edema bilaterally. Full range of motion without deformity. Skin: Skin color, texture, turgor normal.  No rashes or lesions, or suspicious lesions. Neurologic:  Neurovascularly intact without any focal sensory/motor deficits.  Cranial nerves: II-XII intact, grossly non-focal.  Psychiatric:  Alert and oriented, thought content appropriate, normal insight  Capillary Refill: Brisk,< 2 seconds   Peripheral Pulses: +2 palpable, equal bilaterally upper and lower extremities  Lymphatics: no lymphadenopathy    Data: (All radiographs, tracings, PFTs, and imaging are personally viewed and interpreted unless otherwise noted).    Recent Labs     03/29/21  0622 03/30/21  0342   WBC 7.4 17.6*   HGB 12.5* 11.0*   HCT 36.7* 33.3*   PLT 74* 81*     Recent Labs     03/29/21  0622 03/30/21  0342   * 146*   K 3.2* 3.5    110   CO2 32 26   BUN 20 25*   CREATININE 0.9 1.5*   CALCIUM 9.3 8.2*   PHOS 2.7  --      No results for input(s): AST, ALT, BILIDIR, BILITOT, ALKPHOS in the last 72 hours. Recent Labs     03/29/21  0700   INR 1.03     No results for input(s): Marletta [place] in the last 72 hours. Radiology reports-images reviewed in [address], [address] Contrast    Result Date: 3/29/2021  PROCEDURE: CT HEAD WO CONTRAST CLINICAL INFORMATION: sepsis. History of dementia. Found down. COMPARISON: Head CT 8/7/2020. TECHNIQUE: Noncontrast 5 mm axial images were obtained through the brain. Sagittal and coronal reconstructions were obtained. All CT scans at this facility use dose modulation, iterative reconstruction, and/or weight-based dosing when appropriate to reduce radiation dose to as low as reasonably achievable. FINDINGS: There is stable mild global volume loss. There is no hemorrhage. There are no intra-or extra-axial collections. There is no hydrocephalus, midline shift or mass effect. The gray-white matter differentiation is preserved. There is a very mild amount of abnormal low attenuation in the white matter the brain suggesting chronic small vessel ischemic changes. There are vascular calcifications. The paranasal sinuses and mastoid air cells are normally aerated. There is a tube  in the right nasal cavity. No suspicious osseous lesions are present. No evidence of an acute process. **This report has been created using voice recognition software. It may contain minor errors which are inherent in voice recognition technology. ** Final report electronically signed by Dr. Amador Ruiz on 3/29/2021 7:52 AM    Xr Chest Portable    Result Date: 3/29/2021  Exam: 1V chest Comparison:

## 2021-03-30 NOTE — CARE COORDINATION
3/30/21, 7:31 AM EDT  DISCHARGE PLANNING EVALUATION:    Joaquin De Paz       Admitted: 3/29/2021/ R Rufino Nazario 53 day: 1   Location: -28/028-A Reason for admit: Pneumonia [J18.9]   PMH:  has a past medical history of Arthritis, Cardiac murmur, Dementia ([facility] [address] Hypertension, and Parkinson's disease ([facility] [address] Procedure: 3/29 CXR - Coarse right upper lobe density, new vs prior, most consistent with infiltrate. Barriers to Discharge:  Admitted through ED with unresponsiveness and gurgling, acute onset mental status change. Pt was found to be hypothermic (85.7F), now 99. 2F after [employer]. Nasal trumpet. Suctioning prn. Palliative Care was consulted, pt was made Limited code x4, but family wants aggressive measures for treatment to continue. Pt was then transferred to  from [address] [place] [address]. O2 on at 3L/nc, sats 92%. Mathews. Sodium 146, Potassium 3.5, BUN 25, creatinine 1.5, Calcium 8.2. WBC 17.6. Platelets 81. IVF. Vibramycin iv q12hr, Pepcid iv bid, DuoNeb q4hr, Zosyn iv q8hr, Electrolyte replacements. PCP: Ree Louie MD  Readmission Risk Score: 19%    Patient Goals/Plan/Treatment Preferences: Pt is from home with wife. SW following. Hospice consulted. Transportation/Food Security/Housekeeping Addressed:  No issues identified.

## 2021-03-30 NOTE — PROGRESS NOTES
Patient repositioned. Resting in bed eyes closed. Dr. Damion Mendes services in to speak with family. Wife and daughter at bedside. Table and call light within reach.

## 2021-03-30 NOTE — PROGRESS NOTES
Anterior lobes with rhonchi throughout. Venturi mask placed by respiratory therapy at 50L. Oxygen stats fluctuating. Family at bedside. Mathews catheter flowing clear urine, 500 ml emptied at 12:15.   Over bed table and call light within reach

## 2021-03-30 NOTE — PROGRESS NOTES
Hospice met with patient wife Maurine Scheuermann and daughter Sangita Ramos at pt bedside. Patient lying in bed unresponsive to conversation. Patient noted to aspirate on secretions during entire visit with frequent cough and labored breathing. Patient with use of accessory muscles with respirations at 24 minute on 4 L NC. Hospice concepts, philosophies, and services explained to family discussed hospice care vs treatment and if would op for comfort care that all aggressive treatments - including ATB, fluids, K+ placement, monitoring, labs, and diagnostic testing. Discussed qualification process and criteria to meet for GIP hospice care. Told them that uncertain if will meet as he is unable to swallow and with noted labored breathing and cough with aspiration of secretions each time swallows. Patient may be appropriate for small dose of opiate at all time to attempt to maintain symptoms and would be able to titrate for symptom management or doctor may decide on IV push to see if assist with symptoms. Told them that will need to review with Dr. Debora Fernandez, medical director to see if will meet for GIP hospice. Ask that nurse review with physician. Family discussed patient over all health for last few years with Lewy body dementia and  Parkinson's. Tearful. Told them that hospice does nothing to hasten but does nothing to prevent and allows for natural passing. Also let them know that there is a possiblity that when getting patient comfortable that patient may pass quickly. Tearful. Voiced understanding. Support given. Will review with Dr. Debora Fernandez for possible GIP with patient symptoms possibly unable to be done in other setting. Patient lethargic, unable to swallow with aspiration on secretions and harsh cough with each swallow. Has labored breathing with use of accessory muscles with nasal trumpet in place, oxygen NC 4L. Told family that will return to update, after discussion with Dr. Debora Fernandez.

## 2021-03-30 NOTE — FLOWSHEET NOTE
03/29/21 1956   Provider Notification   Reason for Communication Evaluate; Review case   Provider Name Dr. Nancy Patrick   Provider Notification Physician   Method of Communication Secure Message   Response See orders   Notification Time 2016   Shift Event Other (comment)       pt came in today unresponsive from home, was at [facility] who d/c him because he was doing better he got tx for dehydration and hypokalemia. Upon d/c he was getting worse and he was unable to walk or respond so they called the squad and his temp was 85.7. bear hugger on it was just removed with a temp of 99.9. Pt. sounds like he is drowning, needing frequent suctioning, has a nasal trumpet it to help with suctioning his lung sounds are complete ronchi can even hear heart sounds. Secretions were a pink ting color and are now becoming more clear so unsure if it is from the nasal trumpet. Hx wise is minimal other than htn, parkingsons, and lewybody, and a heart murmur. Pt is still unresponsive but opens his eyes and winces when turned. last bp has been lowest since he got to this floor at 87/54 map of 66: 91% on 2LNC resp 20-22      See [facility] for orders.

## 2021-03-30 NOTE — PROGRESS NOTES
Pt continues to have very labored breathing. Pt placed on venti mask. sp02 89-32%. Dr. Lb Kline aware. Family at bedside and understanding pt is very ill at this time and prognosis poor per Dr. Lb Kline. No new orders at this time. Will continue to monitor.

## 2021-03-30 NOTE — PLAN OF CARE
Problem: Impaired respiratory status  Goal: Clear lung sounds  Description: Clear lung sounds  Outcome: Ongoing     Patient has rhonchi throughout at this time, will continue to NT suction and provide treatments to improve lung sounds.

## 2021-03-30 NOTE — PROGRESS NOTES
Thank you ,  Cristian Arias  RN, BSN, [facility]  Clinical   P: [phone]  F: [phone]  Options provided:  -- Sepsis Present on admission confirmed after study  -- Sepsis ruled out after study  -- Other - I will add my own diagnosis  -- Disagree - Not applicable / Not valid  -- Disagree - Clinically unable to determine / Unknown  -- Refer to Clinical Documentation Reviewer    PROVIDER RESPONSE TEXT:    Sepsis ruled out after study.     Query created by: Nadya Nicole on 3/30/2021 8:45 AM      Electronically signed by:  Digna Emmanuel DO 3/30/2021 9:18 AM

## 2021-03-30 NOTE — PROGRESS NOTES
[facility]  SPEECH THERAPY MISSED TREATMENT NOTE  [facility] CCU-STEPDOWN 3B      Date: 3/30/2021  Patient Name: Milly Ahuja        MRN: [id number]    : 1945  (78 y.o.)    REASON FOR MISSED TREATMENT:  ST with call to TREVOR Hernandez at [address] to determine clinical readiness for completion of clinical swallow evaluation. TREVOR Hernandez reporting, \"patient not appropriate at this time. \" ST to hold. Will check back later this date as clinically appropriate and/or schedule permits or . Please call  Acute at #[phone] should clinical swallow evaluation be requested today.     Nita Alegria M.S. [facility]

## 2021-03-30 NOTE — PROGRESS NOTES
Patient not alert, resting with eyes closed. Nasal trumpet in right nare to aide airway. Nasal canula with 3L of oxygen. IV in right arm infusing piperacillin, left arm IV infusing 0.9% NS and Potassium Chloride. Skin turgor and cap refill less than 3 seconds. Radial pulses regular bilaterally. Skin warm, pink and dry. Unable to assess arm drift and hand grasp. Lung sounds with ronchi anterior all lobes. Breathing labored. Heart sounds regular. Bowel sounds active x4. Mathews catheter flowing clear, yellow urine. Pedal pulses regular bilaterally. Unable to assess pedal push and pull. No edema noted. Numbness and tingling unknown. Bedside table and call light within reach.

## 2021-03-31 NOTE — PROGRESS NOTES
This nurse called pts. Family regarding change in pt. Status. Pt. Is now apnec, and mottling, resp are in the 30's and O2 was 68% on 6L. BP sys in the 60's. Wife Kevin Ballard said they would be here in about 1.5 hours.

## 2021-03-31 NOTE — FLOWSHEET NOTE
Pt is a 76y. o. male in 3B-28.  was summoned to room as Landon Guzman passed away, his wife Maurine Scheuermann and daughter Sangita Ramos were present as well.  provided active listening, presence, prayer and encouragement in grief journey. Both expressed gratitude for prayer and presence, lauding the staff and in particular [address] [place] [address], for their kindness in caring for him.     03/31/21 0100   Encounter Summary   Services provided to: Patient and family together   Referral/Consult From: Nursing Supervisor/Manager   Support System Spouse; Children   Complexity of Encounter High   Length of Encounter 1 hour   Grief and Life Adjustment   Type Death   Assessment Tearful;Grieving;Coping   Intervention Sustaining presence/ Ministry of presence; Active listening;Prayer;Grief care   Outcome Receptive; Expressed gratitude; Tearful;Engaged in conversation; Shared reminiscences

## 2021-04-03 LAB
BLOOD CULTURE, ROUTINE: NORMAL
BLOOD CULTURE, ROUTINE: NORMAL

## 2021-04-11 NOTE — DISCHARGE SUMMARY
Vitals:  Vitals:    03/30/21 2307 03/30/21 2357 03/31/21 0024 03/31/21 0025   BP: (!) 90/54 (!) 60/37     Pulse:       Resp: 30  13 12   Temp: 100.1 °F (37.8 °C)      TempSrc: Rectal      SpO2: (!) 72% (!) 69%  (!) 73%   Weight:         Weight: Weight: 166 lb 12.8 oz (75.7 kg)     24 hour intake/output:No intake or output data in the 24 hours ending 04/11/21 1135      General appearance:  No apparent distress, appears stated age and cooperative. HEENT:  Normal cephalic, atraumatic without obvious deformity. Pupils equal, round, and reactive to light. Extra ocular muscles intact. Conjunctivae/corneas clear. Neck: Supple, with full range of motion. No jugular venous distention. Trachea midline. Respiratory:  Normal respiratory effort. Clear to auscultation, bilaterally without Rales/Wheezes/Rhonchi. Cardiovascular:  Regular rate and rhythm with normal S1/S2 without murmurs, rubs or gallops. Abdomen: Soft, non-tender, non-distended with normal bowel sounds. Musculoskeletal:  No clubbing, cyanosis or edema bilaterally. Full range of motion without deformity. Skin: Skin color, texture, turgor normal.  No rashes or lesions. Neurologic:  Neurovascularly intact without any focal sensory/motor deficits. Cranial nerves: II-XII intact, grossly non-focal.  Psychiatric:  Alert and oriented, thought content appropriate, normal insight  Capillary Refill: Brisk,< 3 seconds   Peripheral Pulses: +2 palpable, equal bilaterally       Labs:  For convenience and continuity at follow-up the following most recent labs are provided:      CBC:    Lab Results   Component Value Date    WBC 17.6 03/30/2021    HGB 11.0 03/30/2021    HCT 33.3 03/30/2021    PLT 81 03/30/2021       Renal:    Lab Results   Component Value Date     03/30/2021    K 3.5 03/30/2021     03/30/2021    CO2 26 03/30/2021    BUN 25 03/30/2021    CREATININE 1.5 03/30/2021    CALCIUM 8.2 03/30/2021    PHOS 2.7 03/29/2021         Significant Diagnostic by mouth daily                 Time Spent on discharge is more than 45 minutes in the examination, evaluation, counseling and review of medications and discharge plan. Signed: Thank you Bre Murry MD for the opportunity to be involved in this patient's care.     Electronically signed by Robert Navarro DO on 4/11/2021 at 11:35 AM

## 2024-12-27 NOTE — DISCHARGE SUMMARY
Pt called.  No answer.  LVM and portal message sent   task characterized by recall of 3 ingredients (self-generated by patient) to make one of his favorite meals when provided min cues for use of visualization strategy. Patient completed with the following results:    Immediate recall: 2/3 ingredients recalled independently; required mod cues to recall 1/3 ingredients   8 minute time delay: 3/3 ingredients recalled independently     Goal 2: Patient will complete basic safety awareness, problem solving, and sequencing tasks with 80% accuracy, no cues, to maximize independence for ADL completion. GOAL NOT MET. INTERVENTIONS: Did not address due to focus on other goals. PREVIOUS SESSION: Written sequencing of 5 step ADL tasks: 2/2 with mod cues      Goal 3: Patient will execute 2 step commands 70% accuracy when provided min cues for improved understanding of directives in the therapy and home settings. GOAL NOT MET. INTERVENTIONS: 2 step verbal directions:  5/10 independent; required 1 repetition of instructions in 2/10 trials and mod assist in 3/10 trials    **Decreased understanding of 'before' and 'after' noted. Goal 4: Patient will complete sustained attention tasks with no more than 3 re-directions within 2-3 minute time frame to permit improved participation in therapy sessions and task progression to completion. GOAL NOT MET. INTERVENTIONS: Did not address due to focus on other goals. PREVIOUS SESSION: Patient completed sustained/selective attention task characterized by visual cancellation of target word 'Monday' within visual scanning activity. Patient required x3 repetitions of instructions/target word to identify at onset of task due to decreased working retention of directions. Patient then required an additional x1 redirection during the task as patient began circling all words. No further errors or redirections required within 4 minute task.       Goal 5: Patient will engage in 3 minute conversational sample with an average vocal